# Patient Record
Sex: MALE | Race: WHITE | NOT HISPANIC OR LATINO | URBAN - METROPOLITAN AREA
[De-identification: names, ages, dates, MRNs, and addresses within clinical notes are randomized per-mention and may not be internally consistent; named-entity substitution may affect disease eponyms.]

---

## 2014-08-13 RX ORDER — METOPROLOL TARTRATE 50 MG
0.5 TABLET ORAL
Qty: 0 | Refills: 0 | COMMUNITY
Start: 2014-08-13

## 2014-08-13 RX ORDER — METOPROLOL TARTRATE 50 MG
1 TABLET ORAL
Qty: 0 | Refills: 0 | COMMUNITY
Start: 2014-08-13

## 2015-11-24 RX ORDER — ALBUTEROL 90 UG/1
2 AEROSOL, METERED ORAL
Qty: 0 | Refills: 0 | COMMUNITY
Start: 2015-11-24

## 2016-12-24 RX ORDER — SENNA PLUS 8.6 MG/1
1 TABLET ORAL
Qty: 60 | Refills: 0 | COMMUNITY
Start: 2016-12-24 | End: 2017-01-23

## 2017-07-18 ENCOUNTER — INPATIENT (INPATIENT)
Facility: HOSPITAL | Age: 72
LOS: 2 days | Discharge: NURSING, REHAB OR LONG-TERM CARE | DRG: 73 | End: 2017-07-21
Attending: INTERNAL MEDICINE | Admitting: INTERNAL MEDICINE
Payer: MEDICARE

## 2017-07-18 VITALS
RESPIRATION RATE: 14 BRPM | SYSTOLIC BLOOD PRESSURE: 140 MMHG | OXYGEN SATURATION: 98 % | DIASTOLIC BLOOD PRESSURE: 74 MMHG | HEART RATE: 68 BPM | TEMPERATURE: 98 F

## 2017-07-18 DIAGNOSIS — N18.6 END STAGE RENAL DISEASE: ICD-10-CM

## 2017-07-18 DIAGNOSIS — G62.9 POLYNEUROPATHY, UNSPECIFIED: ICD-10-CM
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
## 2017-07-18 DIAGNOSIS — I25.10 ATHEROSCLEROTIC HEART DISEASE OF NATIVE CORONARY ARTERY WITHOUT ANGINA PECTORIS: ICD-10-CM

## 2017-07-18 DIAGNOSIS — J44.9 CHRONIC OBSTRUCTIVE PULMONARY DISEASE, UNSPECIFIED: ICD-10-CM

## 2017-07-18 DIAGNOSIS — Z98.2 PRESENCE OF CEREBROSPINAL FLUID DRAINAGE DEVICE: Chronic | ICD-10-CM

## 2017-07-18 DIAGNOSIS — G91.9 HYDROCEPHALUS, UNSPECIFIED: ICD-10-CM

## 2017-07-18 LAB
ALBUMIN SERPL ELPH-MCNC: 3.7 G/DL — SIGNIFICANT CHANGE UP (ref 3.3–5)
ALP SERPL-CCNC: 96 U/L — SIGNIFICANT CHANGE UP (ref 40–120)
ALT FLD-CCNC: 11 U/L — LOW (ref 12–78)
ANION GAP SERPL CALC-SCNC: 8 MMOL/L — SIGNIFICANT CHANGE UP (ref 5–17)
APTT BLD: 30.6 SEC — SIGNIFICANT CHANGE UP (ref 27.5–37.4)
AST SERPL-CCNC: 13 U/L — LOW (ref 15–37)
BASOPHILS # BLD AUTO: 0.1 K/UL — SIGNIFICANT CHANGE UP (ref 0–0.2)
BASOPHILS NFR BLD AUTO: 1 % — SIGNIFICANT CHANGE UP (ref 0–2)
BILIRUB SERPL-MCNC: 0.6 MG/DL — SIGNIFICANT CHANGE UP (ref 0.2–1.2)
BUN SERPL-MCNC: 56 MG/DL — HIGH (ref 7–23)
CALCIUM SERPL-MCNC: 9.4 MG/DL — SIGNIFICANT CHANGE UP (ref 8.5–10.1)
CHLORIDE SERPL-SCNC: 98 MMOL/L — SIGNIFICANT CHANGE UP (ref 96–108)
CO2 SERPL-SCNC: 30 MMOL/L — SIGNIFICANT CHANGE UP (ref 22–31)
CREAT SERPL-MCNC: 11 MG/DL — HIGH (ref 0.5–1.3)
EOSINOPHIL # BLD AUTO: 0.3 K/UL — SIGNIFICANT CHANGE UP (ref 0–0.5)
EOSINOPHIL NFR BLD AUTO: 4.3 % — SIGNIFICANT CHANGE UP (ref 0–6)
GLUCOSE SERPL-MCNC: 84 MG/DL — SIGNIFICANT CHANGE UP (ref 70–99)
HCT VFR BLD CALC: 37.2 % — LOW (ref 39–50)
HGB BLD-MCNC: 12.3 G/DL — LOW (ref 13–17)
INR BLD: 1 RATIO — SIGNIFICANT CHANGE UP (ref 0.88–1.16)
LYMPHOCYTES # BLD AUTO: 1.1 K/UL — SIGNIFICANT CHANGE UP (ref 1–3.3)
LYMPHOCYTES # BLD AUTO: 19.2 % — SIGNIFICANT CHANGE UP (ref 13–44)
MCHC RBC-ENTMCNC: 31.4 PG — SIGNIFICANT CHANGE UP (ref 27–34)
MCHC RBC-ENTMCNC: 33.1 GM/DL — SIGNIFICANT CHANGE UP (ref 32–36)
MCV RBC AUTO: 94.9 FL — SIGNIFICANT CHANGE UP (ref 80–100)
MONOCYTES # BLD AUTO: 0.5 K/UL — SIGNIFICANT CHANGE UP (ref 0–0.9)
MONOCYTES NFR BLD AUTO: 8 % — SIGNIFICANT CHANGE UP (ref 1–9)
NEUTROPHILS # BLD AUTO: 4 K/UL — SIGNIFICANT CHANGE UP (ref 1.8–7.4)
NEUTROPHILS NFR BLD AUTO: 67.5 % — SIGNIFICANT CHANGE UP (ref 43–77)
PLATELET # BLD AUTO: 141 K/UL — LOW (ref 150–400)
POTASSIUM SERPL-MCNC: 6 MMOL/L — HIGH (ref 3.5–5.3)
POTASSIUM SERPL-SCNC: 6 MMOL/L — HIGH (ref 3.5–5.3)
PROT SERPL-MCNC: 7.9 G/DL — SIGNIFICANT CHANGE UP (ref 6–8.3)
PROTHROM AB SERPL-ACNC: 10.9 SEC — SIGNIFICANT CHANGE UP (ref 9.8–12.7)
RBC # BLD: 3.92 M/UL — LOW (ref 4.2–5.8)
RBC # FLD: 14.6 % — HIGH (ref 10.3–14.5)
SODIUM SERPL-SCNC: 136 MMOL/L — SIGNIFICANT CHANGE UP (ref 135–145)
WBC # BLD: 6 K/UL — SIGNIFICANT CHANGE UP (ref 3.8–10.5)
WBC # FLD AUTO: 6 K/UL — SIGNIFICANT CHANGE UP (ref 3.8–10.5)

## 2017-07-18 PROCEDURE — 70450 CT HEAD/BRAIN W/O DYE: CPT | Mod: 26

## 2017-07-18 PROCEDURE — 72125 CT NECK SPINE W/O DYE: CPT | Mod: 26

## 2017-07-18 PROCEDURE — 71010: CPT | Mod: 26

## 2017-07-18 PROCEDURE — 73562 X-RAY EXAM OF KNEE 3: CPT | Mod: 26,50

## 2017-07-18 PROCEDURE — 99285 EMERGENCY DEPT VISIT HI MDM: CPT

## 2017-07-18 RX ORDER — METOPROLOL TARTRATE 50 MG
50 TABLET ORAL
Qty: 0 | Refills: 0 | Status: DISCONTINUED | OUTPATIENT
Start: 2017-07-18 | End: 2017-07-21

## 2017-07-18 RX ORDER — HEPARIN SODIUM 5000 [USP'U]/ML
5000 INJECTION INTRAVENOUS; SUBCUTANEOUS EVERY 8 HOURS
Qty: 0 | Refills: 0 | Status: DISCONTINUED | OUTPATIENT
Start: 2017-07-18 | End: 2017-07-21

## 2017-07-18 RX ORDER — NICOTINE POLACRILEX 2 MG
1 GUM BUCCAL DAILY
Qty: 0 | Refills: 0 | Status: DISCONTINUED | OUTPATIENT
Start: 2017-07-18 | End: 2017-07-21

## 2017-07-18 RX ORDER — ASPIRIN/CALCIUM CARB/MAGNESIUM 324 MG
81 TABLET ORAL DAILY
Qty: 0 | Refills: 0 | Status: DISCONTINUED | OUTPATIENT
Start: 2017-07-18 | End: 2017-07-21

## 2017-07-18 RX ORDER — ACETAMINOPHEN 500 MG
500 TABLET ORAL
Qty: 0 | Refills: 0 | Status: DISCONTINUED | OUTPATIENT
Start: 2017-07-18 | End: 2017-07-21

## 2017-07-18 RX ORDER — FERRIC CITRATE 210 MG/1
1 TABLET, COATED ORAL
Qty: 0 | Refills: 0 | COMMUNITY

## 2017-07-18 RX ORDER — SERTRALINE 25 MG/1
1 TABLET, FILM COATED ORAL
Qty: 0 | Refills: 0 | COMMUNITY
Start: 2017-07-18

## 2017-07-18 RX ORDER — GABAPENTIN 400 MG/1
100 CAPSULE ORAL THREE TIMES A DAY
Qty: 0 | Refills: 0 | Status: DISCONTINUED | OUTPATIENT
Start: 2017-07-18 | End: 2017-07-21

## 2017-07-18 RX ORDER — SEVELAMER CARBONATE 2400 MG/1
800 POWDER, FOR SUSPENSION ORAL
Qty: 0 | Refills: 0 | Status: DISCONTINUED | OUTPATIENT
Start: 2017-07-18 | End: 2017-07-18

## 2017-07-18 RX ORDER — TIOTROPIUM BROMIDE 18 UG/1
1 CAPSULE ORAL; RESPIRATORY (INHALATION) DAILY
Qty: 0 | Refills: 0 | Status: DISCONTINUED | OUTPATIENT
Start: 2017-07-18 | End: 2017-07-21

## 2017-07-18 RX ORDER — SEVELAMER CARBONATE 2400 MG/1
800 POWDER, FOR SUSPENSION ORAL
Qty: 0 | Refills: 0 | Status: DISCONTINUED | OUTPATIENT
Start: 2017-07-18 | End: 2017-07-21

## 2017-07-18 RX ORDER — SERTRALINE 25 MG/1
100 TABLET, FILM COATED ORAL DAILY
Qty: 0 | Refills: 0 | Status: DISCONTINUED | OUTPATIENT
Start: 2017-07-18 | End: 2017-07-21

## 2017-07-18 RX ORDER — TRAZODONE HCL 50 MG
50 TABLET ORAL AT BEDTIME
Qty: 0 | Refills: 0 | Status: DISCONTINUED | OUTPATIENT
Start: 2017-07-18 | End: 2017-07-21

## 2017-07-18 RX ORDER — DOCUSATE SODIUM 100 MG
100 CAPSULE ORAL THREE TIMES A DAY
Qty: 0 | Refills: 0 | Status: DISCONTINUED | OUTPATIENT
Start: 2017-07-18 | End: 2017-07-21

## 2017-07-18 RX ORDER — SIMVASTATIN 20 MG/1
20 TABLET, FILM COATED ORAL AT BEDTIME
Qty: 0 | Refills: 0 | Status: DISCONTINUED | OUTPATIENT
Start: 2017-07-18 | End: 2017-07-21

## 2017-07-18 RX ORDER — FERRIC CITRATE 210 MG/1
0 TABLET, COATED ORAL
Qty: 0 | Refills: 0 | COMMUNITY

## 2017-07-18 RX ORDER — PANTOPRAZOLE SODIUM 20 MG/1
40 TABLET, DELAYED RELEASE ORAL
Qty: 0 | Refills: 0 | Status: DISCONTINUED | OUTPATIENT
Start: 2017-07-18 | End: 2017-07-21

## 2017-07-18 RX ADMIN — Medication 100 MILLIGRAM(S): at 22:38

## 2017-07-18 RX ADMIN — SIMVASTATIN 20 MILLIGRAM(S): 20 TABLET, FILM COATED ORAL at 22:38

## 2017-07-18 RX ADMIN — GABAPENTIN 100 MILLIGRAM(S): 400 CAPSULE ORAL at 22:38

## 2017-07-18 RX ADMIN — Medication 50 MILLIGRAM(S): at 22:38

## 2017-07-18 RX ADMIN — HEPARIN SODIUM 5000 UNIT(S): 5000 INJECTION INTRAVENOUS; SUBCUTANEOUS at 22:38

## 2017-07-18 NOTE — H&P ADULT - ASSESSMENT
70yo male on hd for esrd with hx of nph and ventricular shunt presents with frequent falls and unsteady gait

## 2017-07-18 NOTE — ED PROVIDER NOTE - PROGRESS NOTE DETAILS
mark (renal) seen eval pt in er, requests admit pt for dialysis to [facility]. [facility] (med) aware, will admit

## 2017-07-18 NOTE — ED ADULT NURSE NOTE - PSH
ESRD on dialysis  peritoneal dialysis catheter in place, left arm AV graft in place  S/P angioplasty with stent    S/P coronary artery bypass graft x 3    S/P ventricular shunt placement

## 2017-07-18 NOTE — ED PROVIDER NOTE - CARE PLAN
Principal Discharge DX:	Fall, initial encounter  Secondary Diagnosis:	Abrasion  Secondary Diagnosis:	Knee injury

## 2017-07-18 NOTE — ED PROVIDER NOTE - OBJECTIVE STATEMENT
pt bib ems for b/l knee abrasions and pain s/p mult falls today.   [facility]  [facility] - [facility] pt bib ems for b/l knee abrasions and pain s/p mult falls today. per pt and family, pt has chronic unsteady gait, falls frequently. c/o generalized weakness. denies head inj, neck or back pain, focal weakness or numbness, cp, sob, abd pain.  [facility]  [facility] - [facility]

## 2017-07-18 NOTE — CONSULT NOTE ADULT - ASSESSMENT
71 white male with a history of DM. HTN, CAD, CHF, ESRD on HD now admitted with history of fall.   Unsteady gait and falls with a history of  shunt in 2015. Neurology evaluation.   Hyperkalemia, will dialyze today for 3.5 hours with 3 kg fluid removal and on 2 K bath.  will follow closely.

## 2017-07-18 NOTE — ED ADULT NURSE NOTE - PMH
Anemia    CAD (coronary artery disease)    COPD (chronic obstructive pulmonary disease)    DM (diabetes mellitus)    ESRD on peritoneal dialysis    Hydrocephalus

## 2017-07-18 NOTE — H&P ADULT - HISTORY OF PRESENT ILLNESS
pt presents to hospital with complaints of frequent falling and unsteady gait, that has been getting worse, states his legs are weak and give out. he has been in and out of the hospital multiple times for this. today was getting ready to go to hd and fell after legs gave out, he has been using his walker more frequently lately, no cp or sob. gets hd on tues/thurs and sat.

## 2017-07-18 NOTE — H&P ADULT - PROBLEM SELECTOR PLAN 5
continue inhalers  radha ordered   ct findings reviewed with pt, states he had a PET scan recently which was ordered by his pulm, and he is due for fu ct of chest as outpt with his pulm

## 2017-07-18 NOTE — ED ADULT NURSE NOTE - OBJECTIVE STATEMENT
pt presents to the ED post fall while on his way to dialysis. Pt states he felt weak and felt like his legs gave up and fell forward on his knees. denies hitting his head or chest. Pt states he is on aspirin daily. denies LOC. No SOB, no chest pain. pt appears in no distress. noted abrasions on bilateral knees and arms. will continue to monitor.

## 2017-07-19 ENCOUNTER — TRANSCRIPTION ENCOUNTER (OUTPATIENT)
Age: 72
End: 2017-07-19

## 2017-07-19 DIAGNOSIS — D64.9 ANEMIA, UNSPECIFIED: ICD-10-CM

## 2017-07-19 DIAGNOSIS — K59.00 CONSTIPATION, UNSPECIFIED: ICD-10-CM

## 2017-07-19 DIAGNOSIS — M41.9 SCOLIOSIS, UNSPECIFIED: ICD-10-CM

## 2017-07-19 PROCEDURE — 72040 X-RAY EXAM NECK SPINE 2-3 VW: CPT | Mod: 26

## 2017-07-19 PROCEDURE — 93010 ELECTROCARDIOGRAM REPORT: CPT

## 2017-07-19 RX ORDER — SENNA PLUS 8.6 MG/1
2 TABLET ORAL AT BEDTIME
Qty: 0 | Refills: 0 | Status: DISCONTINUED | OUTPATIENT
Start: 2017-07-19 | End: 2017-07-21

## 2017-07-19 RX ADMIN — SEVELAMER CARBONATE 800 MILLIGRAM(S): 2400 POWDER, FOR SUSPENSION ORAL at 13:06

## 2017-07-19 RX ADMIN — SIMVASTATIN 20 MILLIGRAM(S): 20 TABLET, FILM COATED ORAL at 21:52

## 2017-07-19 RX ADMIN — HEPARIN SODIUM 5000 UNIT(S): 5000 INJECTION INTRAVENOUS; SUBCUTANEOUS at 05:21

## 2017-07-19 RX ADMIN — SERTRALINE 100 MILLIGRAM(S): 25 TABLET, FILM COATED ORAL at 13:04

## 2017-07-19 RX ADMIN — Medication 100 MILLIGRAM(S): at 21:51

## 2017-07-19 RX ADMIN — PANTOPRAZOLE SODIUM 40 MILLIGRAM(S): 20 TABLET, DELAYED RELEASE ORAL at 05:21

## 2017-07-19 RX ADMIN — HEPARIN SODIUM 5000 UNIT(S): 5000 INJECTION INTRAVENOUS; SUBCUTANEOUS at 13:03

## 2017-07-19 RX ADMIN — SEVELAMER CARBONATE 800 MILLIGRAM(S): 2400 POWDER, FOR SUSPENSION ORAL at 17:31

## 2017-07-19 RX ADMIN — SEVELAMER CARBONATE 800 MILLIGRAM(S): 2400 POWDER, FOR SUSPENSION ORAL at 07:52

## 2017-07-19 RX ADMIN — Medication 50 MILLIGRAM(S): at 21:52

## 2017-07-19 RX ADMIN — Medication 100 MILLIGRAM(S): at 05:21

## 2017-07-19 RX ADMIN — GABAPENTIN 100 MILLIGRAM(S): 400 CAPSULE ORAL at 13:04

## 2017-07-19 RX ADMIN — GABAPENTIN 100 MILLIGRAM(S): 400 CAPSULE ORAL at 21:52

## 2017-07-19 RX ADMIN — Medication 50 MILLIGRAM(S): at 17:31

## 2017-07-19 RX ADMIN — Medication 81 MILLIGRAM(S): at 13:05

## 2017-07-19 RX ADMIN — Medication 1 TABLET(S): at 13:03

## 2017-07-19 RX ADMIN — TIOTROPIUM BROMIDE 1 CAPSULE(S): 18 CAPSULE ORAL; RESPIRATORY (INHALATION) at 07:54

## 2017-07-19 RX ADMIN — HEPARIN SODIUM 5000 UNIT(S): 5000 INJECTION INTRAVENOUS; SUBCUTANEOUS at 21:52

## 2017-07-19 RX ADMIN — SENNA PLUS 2 TABLET(S): 8.6 TABLET ORAL at 21:52

## 2017-07-19 RX ADMIN — Medication 1 PATCH: at 13:04

## 2017-07-19 RX ADMIN — Medication 50 MILLIGRAM(S): at 05:21

## 2017-07-19 RX ADMIN — Medication 100 MILLIGRAM(S): at 13:04

## 2017-07-19 RX ADMIN — GABAPENTIN 100 MILLIGRAM(S): 400 CAPSULE ORAL at 05:21

## 2017-07-19 NOTE — PHYSICAL THERAPY INITIAL EVALUATION ADULT - IMPAIRED TRANSFERS: SIT/STAND, REHAB EVAL
impaired postural control/impaired coordination/impaired balance/decreased strength/impaired motor control/Pt reports sensation intact

## 2017-07-19 NOTE — PHYSICAL THERAPY INITIAL EVALUATION ADULT - GAIT DEVIATIONS NOTED, PT EVAL
increased alicia, vc to slow down. Pt also lifts rolling walker off ground when turning, cues for safety/decreased stride length/decreased velocity of limb motion/decreased step length/decreased weight-shifting ability

## 2017-07-19 NOTE — PHYSICAL THERAPY INITIAL EVALUATION ADULT - IMPAIRMENTS FOUND, PT EVAL
integumentary integrity/sensory integrity/fine motor/Coordination, stairs/muscle strength/gait, locomotion, and balance

## 2017-07-19 NOTE — PROGRESS NOTE ADULT - SUBJECTIVE AND OBJECTIVE BOX
Patient is a 71y old  Male who presents with a chief complaint of fall (18 Jul 2017 21:21)      INTERVAL HPI/OVERNIGHT EVENTS:pt stable, feels ok, no pain, pt and neuro eval noted. ortho called to evaulated ct findings.    MEDICATIONS  (STANDING):  aspirin enteric coated 81 milliGRAM(s) Oral daily  gabapentin 100 milliGRAM(s) Oral three times a day  traZODone 50 milliGRAM(s) Oral at bedtime  sertraline 100 milliGRAM(s) Oral daily  simvastatin 20 milliGRAM(s) Oral at bedtime  metoprolol 50 milliGRAM(s) Oral two times a day  tiotropium 18 MICROgram(s) Capsule 1 Capsule(s) Inhalation daily  docusate sodium 100 milliGRAM(s) Oral three times a day  pantoprazole    Tablet 40 milliGRAM(s) Oral before breakfast  nicotine - 21 mG/24Hr(s) Patch 1 patch Transdermal daily  multivitamin 1 Tablet(s) Oral daily  heparin  Injectable 5000 Unit(s) SubCutaneous every 8 hours  sevelamer carbonate 800 milliGRAM(s) Oral three times a day with meals    MEDICATIONS  (PRN):  acetaminophen   Tablet 500 milliGRAM(s) Oral four times a day PRN For Temp greater than 38 C (100.4 F)  acetaminophen   Tablet. 500 milliGRAM(s) Oral four times a day PRN Mild Pain (1 - 3)      Allergies    No Known Allergies    Intolerances        REVIEW OF SYSTEMS:  CONSTITUTIONAL: No fever, weight loss, or fatigue  EYES: No eye pain, visual disturbances  ENMT:  No difficulty hearing, tinnitus, vertigo; No sinus or throat pain  NECK: No pain or stiffness  RESPIRATORY: No cough, wheezing, chills or hemoptysis; No shortness of breath  CARDIOVASCULAR: No chest pain, palpitations, dizziness  GASTROINTESTINAL: No abdominal or epigastric pain. No nausea, vomiting, or hematemesis; No diarrhea or constipation. No melena or hematochezia.  GENITOURINARY: No dysuria, frequency, hematuria, or incontinence  NEUROLOGICAL: No headaches, memory loss, loss of strength, numbness, or tremors  SKIN: No itching, burning  LYMPH NODES: No enlarged glands  MUSCULOSKELETAL: le weakness, no focal deficits,no neck pain  PSYCHIATRIC: No depression, mood swings  HEME/LYMPH: No easy bruising, or bleeding gums  ALLERGY AND IMMUNOLOGIC: No hives    Vital Signs Last 24 Hrs  T(C): 36.6 (19 Jul 2017 13:49), Max: 36.9 (18 Jul 2017 15:47)  T(F): 97.9 (19 Jul 2017 13:49), Max: 98.5 (18 Jul 2017 20:55)  HR: 62 (19 Jul 2017 13:49) (62 - 68)  BP: 106/61 (19 Jul 2017 13:49) (106/61 - 149/73)  BP(mean): --  RR: 18 (19 Jul 2017 13:49) (14 - 19)  SpO2: 93% (19 Jul 2017 04:18) (93% - 100%)    PHYSICAL EXAM:  GENERAL: NAD, well-groomed, well-developed  HEAD:  Atraumatic, Normocephalic,  EYES: EOMI, PERRLA, conjunctiva and sclera clear  ENMT: No tonsillar erythema, exudates, or enlargement   NECK: Supple, No JVD, no deficits noted  NERVOUS SYSTEM:  Alert & Oriented X3, Good concentration  CHEST/LUNG: Clear to auscultation bilaterally; No rales, rhonchi, wheezing  HEART: Regular rate and rhythm  ABDOMEN: Soft, Nontender, Nondistended; Bowel sounds present  EXTREMITIES:  2+ Peripheral Pulses, no edema   LYMPH: No lymphadenopathy noted  SKIN: No rashes     LABS:                        12.3   6.0   )-----------( 141      ( 18 Jul 2017 15:15 )             37.2     18 Jul 2017 15:15    136    |  98     |  56     ----------------------------<  84     6.0     |  30     |  11.00    Ca    9.4        18 Jul 2017 15:15    TPro  7.9    /  Alb  3.7    /  TBili  0.6    /  DBili  x      /  AST  13     /  ALT  11     /  AlkPhos  96     18 Jul 2017 15:15    PT/INR - ( 18 Jul 2017 15:15 )   PT: 10.9 sec;   INR: 1.00 ratio         PTT - ( 18 Jul 2017 15:15 )  PTT:30.6 sec    CAPILLARY BLOOD GLUCOSE                RADIOLOGY & ADDITIONAL TESTS:  Patient is a 71y old  Male who presents with a chief complaint of fall (18 Jul 2017 21:21)      INTERVAL HPI/OVERNIGHT EVENTS:    MEDICATIONS  (STANDING):  aspirin enteric coated 81 milliGRAM(s) Oral daily  gabapentin 100 milliGRAM(s) Oral three times a day  traZODone 50 milliGRAM(s) Oral at bedtime  sertraline 100 milliGRAM(s) Oral daily  simvastatin 20 milliGRAM(s) Oral at bedtime  metoprolol 50 milliGRAM(s) Oral two times a day  tiotropium 18 MICROgram(s) Capsule 1 Capsule(s) Inhalation daily  docusate sodium 100 milliGRAM(s) Oral three times a day  pantoprazole    Tablet 40 milliGRAM(s) Oral before breakfast  nicotine - 21 mG/24Hr(s) Patch 1 patch Transdermal daily  multivitamin 1 Tablet(s) Oral daily  heparin  Injectable 5000 Unit(s) SubCutaneous every 8 hours  sevelamer carbonate 800 milliGRAM(s) Oral three times a day with meals    MEDICATIONS  (PRN):  acetaminophen   Tablet 500 milliGRAM(s) Oral four times a day PRN For Temp greater than 38 C (100.4 F)  acetaminophen   Tablet. 500 milliGRAM(s) Oral four times a day PRN Mild Pain (1 - 3)      Allergies    No Known Allergies    Intolerances        REVIEW OF SYSTEMS:  CONSTITUTIONAL: No fever, weight loss, or fatigue  EYES: No eye pain, visual disturbances  ENMT:  No difficulty hearing, tinnitus, vertigo; No sinus or throat pain  NECK: No pain or stiffness  RESPIRATORY: No cough, wheezing, chills or hemoptysis; No shortness of breath  CARDIOVASCULAR: No chest pain, palpitations, dizziness  GASTROINTESTINAL: No abdominal or epigastric pain. No nausea, vomiting, or hematemesis; No diarrhea or constipation. No melena or hematochezia.  GENITOURINARY: No dysuria, frequency, hematuria, or incontinence  NEUROLOGICAL: No headaches, memory loss, loss of strength, numbness, or tremors  SKIN: No itching, burning  LYMPH NODES: No enlarged glands  MUSCULOSKELETAL: No joint pain or swelling; No muscle, back, or extremity pain  PSYCHIATRIC: No depression, mood swings  HEME/LYMPH: No easy bruising, or bleeding gums  ALLERGY AND IMMUNOLOGIC: No hives    Vital Signs Last 24 Hrs  T(C): 36.6 (19 Jul 2017 13:49), Max: 36.9 (18 Jul 2017 15:47)  T(F): 97.9 (19 Jul 2017 13:49), Max: 98.5 (18 Jul 2017 20:55)  HR: 62 (19 Jul 2017 13:49) (62 - 68)  BP: 106/61 (19 Jul 2017 13:49) (106/61 - 149/73)  BP(mean): --  RR: 18 (19 Jul 2017 13:49) (14 - 19)  SpO2: 93% (19 Jul 2017 04:18) (93% - 100%)    PHYSICAL EXAM:  GENERAL: NAD, well-groomed, well-developed  HEAD:  Atraumatic, Normocephalic  EYES: EOMI, PERRLA, conjunctiva and sclera clear  ENMT: No tonsillar erythema, exudates, or enlargement   NECK: Supple, No JVD  NERVOUS SYSTEM:  Alert & Oriented X3, Good concentration  CHEST/LUNG: Clear to auscultation bilaterally; No rales, rhonchi, wheezing  HEART: Regular rate and rhythm  ABDOMEN: Soft, Nontender, Nondistended; Bowel sounds present  EXTREMITIES:  2+ Peripheral Pulses   LYMPH: No lymphadenopathy noted  SKIN: No rashes     LABS:                        12.3   6.0   )-----------( 141      ( 18 Jul 2017 15:15 )             37.2     18 Jul 2017 15:15    136    |  98     |  56     ----------------------------<  84     6.0     |  30     |  11.00    Ca    9.4        18 Jul 2017 15:15    TPro  7.9    /  Alb  3.7    /  TBili  0.6    /  DBili  x      /  AST  13     /  ALT  11     /  AlkPhos  96     18 Jul 2017 15:15    PT/INR - ( 18 Jul 2017 15:15 )   PT: 10.9 sec;   INR: 1.00 ratio         PTT - ( 18 Jul 2017 15:15 )  PTT:30.6 sec    CAPILLARY BLOOD GLUCOSE                RADIOLOGY & ADDITIONAL TESTS:  imaging reviewed with ortho hs      Consultant(s) Notes Reviewed:  x] YES  [ ] NO    Care Discussed with Consultants/Other Providers [x ] YES  [ ] NO    Advanced Care Planning Discussed with Patien/Family [ ] YES  [x ] NO

## 2017-07-19 NOTE — PHYSICAL THERAPY INITIAL EVALUATION ADULT - PLANNED THERAPY INTERVENTIONS, PT EVAL
bed mobility training/transfer training/Assess stairs in 1-2 sessions/strengthening/gait training/balance training

## 2017-07-19 NOTE — PHYSICAL THERAPY INITIAL EVALUATION ADULT - SKIN COLOR/CHARACTERISTICS
bruised (ecchymotic)/Old scar to right side of forehead after shunt. IV to left UE. Eccymosis or discoloration to right side of forehead. Tan skin. AV shunt to RUE

## 2017-07-19 NOTE — PHYSICAL THERAPY INITIAL EVALUATION ADULT - PERTINENT HX OF CURRENT PROBLEM, REHAB EVAL
As per H&P: "pt presents to hospital with complaints of frequent falling and unsteady gait, that has been getting worse, states his legs are weak and give out. he has been in and out of the hospital multiple times for this. today was getting ready to go to hd and fell after legs gave out, he has been using his walker more frequently lately, no cp or sob. gets hd on tues/thurs and sat.

## 2017-07-19 NOTE — PHYSICAL THERAPY INITIAL EVALUATION ADULT - PRECAUTIONS/LIMITATIONS, REHAB EVAL
oxygen therapy device and L/min/fall precautions/**Pt admits to falling at least 1x/wk; ventricular shunt for hydrocephalus ~2 1/2  year ago as per pt

## 2017-07-19 NOTE — DISCHARGE NOTE ADULT - CARE PROVIDER_API CALL
Redd Gray (GILMAR), Neurology  [address] [place] [address]  Phone: [phone]  Fax: [phone]    Luis Conde), Medicine  [address] [facility] [address] [place] [address]  Phone: [phone]  Fax: [phone]    Dayton Bain), Medicine  [address] [place] [address] [address] [place] [address]  Phone: [phone]  Fax: [phone]

## 2017-07-19 NOTE — DISCHARGE NOTE ADULT - HOSPITAL COURSE
admitted for frequent falls for legs always giving out, pt with hx of nph with  shunt, seen by pt, can go home with home pt. pt also seen by his neuro team, no new neuro findings. pt also seen by ortho for c-spine findings of very mild cervical sublaxation - no intervention at this time - outpt fu. he will fu with his pulm in community for ct/pet of chest. going home in stable condition after hd today. admitted for frequent falls for legs always giving out, pt with hx of nph with  shunt, seen by pt, can go home with home pt. pt also seen by his neuro team, no new neuro findings. pt also seen by ortho for c-spine findings of very mild cervical sublaxation - no intervention at this time - outpt fu. he will fu with his pulm in community for ct/pet of chest. going to [facility] today

## 2017-07-19 NOTE — DISCHARGE NOTE ADULT - NS AS ACTIVITY OBS
Bathing allowed/Showering allowed/Walking-Indoors allowed/Walking-Outdoors allowed/Stairs allowed/No Heavy lifting/straining

## 2017-07-19 NOTE — PHYSICAL THERAPY INITIAL EVALUATION ADULT - GENERAL OBSERVATIONS, REHAB EVAL
Pt rec'd sitting up at edge bed, NAD noted. Pt noted to have bilateral gauze dressings to bilateral knees. Pt agreeable /c PT eval.

## 2017-07-19 NOTE — PHYSICAL THERAPY INITIAL EVALUATION ADULT - ADDITIONAL COMMENTS
Pt lives with his brother in a private home. Pt lives downstairs alone and brother is upstairs. Pt has 6 ROBERT and another 6 inside + 6 with handrails. Pt was independent with functional mobility, ADLs and ambulation. Pt uses his rolling walker outdoors during dialysis days. Pt also has a single axis cane. Pt does not use assistive device indoors but admits to "furniture walks". Pt has had history of frequent falls in past prior to shunt placement and admits to falling at least 1x/wk currently. Pt owns shower chair, single axis cane, rolling walker, nebulizer and grab bars. Pt brother works and is in during noon time. Pt brother takes him to dialysis & other apportionments. Pt has inhalers. Pt reports being "paralysed" at birth.

## 2017-07-19 NOTE — DISCHARGE NOTE ADULT - PATIENT PORTAL LINK FT
“You can access the [facility] Patient Portal, offered by [facility], by registering with the following website: [email or web address]”

## 2017-07-19 NOTE — CONSULT NOTE ADULT - PROBLEM SELECTOR RECOMMENDATION 9
on colace 100 mg TID  add senna 2 tabs qhs  monitor for BM  likely due to slow colonic transit  outpatient follow up for colonoscopy

## 2017-07-19 NOTE — PHYSICAL THERAPY INITIAL EVALUATION ADULT - ANKLE STRATEGY ASSESSMENT, REHAB EVAL
Pt reports that falls happen and are unpredictable. Pt denies feeling any dizziness/lightheadiness when they occur. Pt states he legs just "give out". admits to falling more on right side and has hit head lightly on refrigerator. Pt says he gradually falls and is not abrupt when he does fall.

## 2017-07-19 NOTE — CONSULT NOTE ADULT - SUBJECTIVE AND OBJECTIVE BOX
Nephrology Consultation: MD MAREK Andujar, SABINE  71y    HPI: 71 white male with a history of HTN, CAD, CHF, ESRD on HD, DM, PVD now presented with history of fall X 4 today. As per patient he has been unsteady on his feet for the last one or two weeks. He has been using walker to ambulate. Denies any chest pain or shortness of breath. Denies any fever, chills or head aches. Denies any nausea, vomiting or diarrhea. Has a history of  shunt for the hydrocephalus.       PAST MEDICAL & SURGICAL HISTORY:  Hydrocephalus  Anemia  CAD (coronary artery disease)  DM (diabetes mellitus)  ESRD on peritoneal dialysis  COPD (chronic obstructive pulmonary disease)  S/P ventricular shunt placement  ESRD on dialysis: peritoneal dialysis catheter in place, left arm AV graft in place  S/P angioplasty with stent  S/P coronary artery bypass graft x 3      Allergies    No Known Allergies    Intolerances            FAMILY HISTORY:  No pertinent family history in first degree relatives      SOCIAL HISTORY:    REVIEW OF SYSTEMS:    Constitutional: No fever, weight loss or fatigue  Eyes: No eye pain, visual disturbances, or discharge  ENT:  No difficulty hearing, tinnitus, vertigo; No sinus or throat pain  Neck: No pain or stiffness  Breasts: No pain, masses or nipple discharge  Respiratory: No cough, wheezing, chills or hemoptysis  Cardiovascular: No chest pain, palpitations, shortness of breath, dizziness or leg swelling  Gastrointestinal: No abdominal or epigastric pain. No nausea, vomiting or hematemesis; No diarrhea or constipation. No melena or hematochezia.  Genitourinary: No dysuria, frequency, hematuria or incontinence  Rectal: No pain, hemorrhoids or incontinence  Neurological: No headaches, memory loss, loss of strength, numbness or tremors  Skin: No itching, burning, rashes or lesions   Lymph Nodes: No enlarged glands  Endocrine: No heat or cold intolerance; No hair loss  Musculoskeletal: No joint pain or swelling; No muscle, back or extremity pain  Psychiatric: No depression, anxiety, mood swings or difficulty sleeping  Heme/Lymph: No easy bruising or bleeding gums  Allergy and Immunologic: No hives or eczema        Vital Signs Last 24 Hrs  T(C): 36.9 (18 Jul 2017 15:47), Max: 36.9 (18 Jul 2017 15:47)  T(F): 98.4 (18 Jul 2017 15:47), Max: 98.4 (18 Jul 2017 15:47)  HR: 63 (18 Jul 2017 15:47) (63 - 68)  BP: 135/73 (18 Jul 2017 15:47) (135/73 - 140/74)  BP(mean): --  RR: 15 (18 Jul 2017 15:47) (14 - 15)  SpO2: 94% (18 Jul 2017 15:47) (94% - 98%)    PHYSICAL EXAM:    Constitutional: NAD, well-groomed, well-developed  HEENT: PERRLA, EOMI, Normal Hearing, MMM  Neck: No LAD, No JVD  Back: Normal spine flexure, No CVA tenderness  Respiratory: CTAB/L   Cardiovascular: S1 and S2, RRR, no M/G/R  Gastrointestinal: BS+, soft, NT/ND  Extremities: No peripheral edema, has multiple bruises on the legs.  Vascular: 2+ peripheral pulses  Neurological: A/O x 3, no focal deficits  Skin: No rashes      LABS:                        12.3   6.0   )-----------( 141      ( 18 Jul 2017 15:15 )             37.2     07-18    136  |  98  |  56<H>  ----------------------------<  84  6.0<H>   |  30  |  11.00<H>    Ca    9.4      18 Jul 2017 15:15    TPro  7.9  /  Alb  3.7  /  TBili  0.6  /  DBili  x   /  AST  13<L>  /  ALT  11<L>  /  AlkPhos  96  07-18    PT/INR - ( 18 Jul 2017 15:15 )   PT: 10.9 sec;   INR: 1.00 ratio         PTT - ( 18 Jul 2017 15:15 )  PTT:30.6 sec      MICROBIOLOGY:  RECENT CULTURES:        RADIOLOGY & ADDITIONAL STUDIES:    < from: Xray Knee 3 Views, Bilateral (07.18.17 @ 15:22) >    INTERPRETATION:  Bilateral knee pain. Injury    Bilateral knees, 5 views.    No fracture or dislocation bilaterally. Mild bilateral medial compartment   narrowing. Extensive bilateral arterial calcification. Surgical clips   project over the left popliteal region.    Impression: No fracture
Chief Complaint:  Patient is a 71y old  Male who presents with a chief complaint of fall (2017 14:19)      HPI: 70 yo male admitted s/p fall with complaint of constipation. Patient states his last BM was 2 days ago. Normally has a bm once every 2 days. Denies blood in stools, abdominal pain, weight loss.     Allergies:  No Known Allergies      Medications:  aspirin enteric coated 81 milliGRAM(s) Oral daily  acetaminophen   Tablet 500 milliGRAM(s) Oral four times a day PRN  acetaminophen   Tablet. 500 milliGRAM(s) Oral four times a day PRN  gabapentin 100 milliGRAM(s) Oral three times a day  traZODone 50 milliGRAM(s) Oral at bedtime  sertraline 100 milliGRAM(s) Oral daily  simvastatin 20 milliGRAM(s) Oral at bedtime  metoprolol 50 milliGRAM(s) Oral two times a day  tiotropium 18 MICROgram(s) Capsule 1 Capsule(s) Inhalation daily  docusate sodium 100 milliGRAM(s) Oral three times a day  pantoprazole    Tablet 40 milliGRAM(s) Oral before breakfast  nicotine - 21 mG/24Hr(s) Patch 1 patch Transdermal daily  multivitamin 1 Tablet(s) Oral daily  heparin  Injectable 5000 Unit(s) SubCutaneous every 8 hours  sevelamer carbonate 800 milliGRAM(s) Oral three times a day with meals      PMHX/PSHX:  Hydrocephalus  Anemia  CAD (coronary artery disease)  DM (diabetes mellitus)  ESRD on peritoneal dialysis  COPD (chronic obstructive pulmonary disease)  S/P ventricular shunt placement  ESRD on dialysis  S/P angioplasty with stent  S/P coronary artery bypass graft x 3      Family history:  No pertinent family history in first degree relatives      Social History:     ROS:     General:  No wt loss, fevers, chills, night sweats, fatigue,   Eyes:  Good vision, no reported pain  ENT:  No sore throat, pain, runny nose, dysphagia  CV:  No pain, palpitations, hypo/hypertension  Resp:  No dyspnea, cough, tachypnea, wheezing  GI:  No pain, No nausea, No vomiting, No diarrhea, No constipation, No weight loss, No fever, No pruritis, No rectal bleeding, No tarry stools, No dysphagia,  :  No pain, bleeding, incontinence, nocturia  Muscle:  No pain, weakness  Neuro:  No weakness, tingling, memory problems  Psych:  No fatigue, insomnia, mood problems, depression  Endocrine:  No polyuria, polydipsia, cold/heat intolerance  Heme:  No petechiae, ecchymosis, easy bruisability  Skin:  No rash, tattoos, scars, edema      PHYSICAL EXAM:   Vital Signs:  Vital Signs Last 24 Hrs  T(C): 36.6 (2017 13:49), Max: 36.9 (2017 15:47)  T(F): 97.9 (2017 13:49), Max: 98.5 (2017 20:55)  HR: 62 (2017 13:49) (62 - 66)  BP: 106/61 (2017 13:49) (106/61 - 149/73)  BP(mean): --  RR: 18 (2017 13:49) (15 - 19)  SpO2: 93% (2017 04:18) (93% - 100%)  Daily     Daily Weight in k.2 (2017 14:19)    GENERAL:  Appears stated age, well-groomed, well-nourished, no distress  HEENT:  NC/AT,  conjunctivae clear and pink, no thyromegaly, nodules, adenopathy, no JVD, sclera -anicteric  CHEST:  Full & symmetric excursion, no increased effort, breath sounds clear  HEART:  Regular rhythm, S1, S2, no murmur/rub/S3/S4, no abdominal bruit, no edema  ABDOMEN:  Soft, non-tender, non-distended, normoactive bowel sounds,  no masses ,no hepato-splenomegaly, no signs of chronic liver disease  EXTEREMITIES:  no cyanosis,clubbing or edema  SKIN:  No rash/erythema/ecchymoses/petechiae/wounds/abscess/warm/dry  NEURO:  Alert, oriented, no asterixis, no tremor, no encephalopathy    LABS:                        12.3   6.0   )-----------( 141      ( 2017 15:15 )             37.2     07-18    136  |  98  |  56<H>  ----------------------------<  84  6.0<H>   |  30  |  11.00<H>    Ca    9.4      2017 15:15    TPro  7.9  /  Alb  3.7  /  TBili  0.6  /  DBili  x   /  AST  13<L>  /  ALT  11<L>  /  AlkPhos  96  07-18    LIVER FUNCTIONS - ( 2017 15:15 )  Alb: 3.7 g/dL / Pro: 7.9 g/dL / ALK PHOS: 96 U/L / ALT: 11 U/L / AST: 13 U/L / GGT: x           PT/INR - ( 2017 15:15 )   PT: 10.9 sec;   INR: 1.00 ratio         PTT - ( 2017 15:15 )  PTT:30.6 sec        Imaging:
Patient is a 71y Male community ambulator w/o assitive device presents to ortho team while being admitted for frequent falls. Pt fell over 1 week ago. denies hs/loc. pt denies any neck pain. pt able to currently ambulate w/o pain. Denies pain/injury elsewhere. Denies numbness/tingling/paresthesias/weakness. Denies bowel/bladder incontinence. Denies fevers/chills. No other complaints at this time. pt has shunt for NPH in which follows up with nsx.    HEALTH ISSUES - PROBLEM Dx:  Cervical scoliosis: Cervical scoliosis  COPD (chronic obstructive pulmonary disease): COPD (chronic obstructive pulmonary disease)  CAD (coronary artery disease): CAD (coronary artery disease)  ESRD on peritoneal dialysis: ESRD on peritoneal dialysis  Hydrocephalus: Hydrocephalus  Fall, initial encounter: Fall, initial encounter      MEDICATIONS  (STANDING):  aspirin enteric coated 81 milliGRAM(s) Oral daily  gabapentin 100 milliGRAM(s) Oral three times a day  traZODone 50 milliGRAM(s) Oral at bedtime  sertraline 100 milliGRAM(s) Oral daily  simvastatin 20 milliGRAM(s) Oral at bedtime  metoprolol 50 milliGRAM(s) Oral two times a day  tiotropium 18 MICROgram(s) Capsule 1 Capsule(s) Inhalation daily  docusate sodium 100 milliGRAM(s) Oral three times a day  pantoprazole    Tablet 40 milliGRAM(s) Oral before breakfast  nicotine - 21 mG/24Hr(s) Patch 1 patch Transdermal daily  multivitamin 1 Tablet(s) Oral daily  heparin  Injectable 5000 Unit(s) SubCutaneous every 8 hours  sevelamer carbonate 800 milliGRAM(s) Oral three times a day with meals    Allergies    No Known Allergies    Intolerances    PAST MEDICAL & SURGICAL HISTORY:  Hydrocephalus  Anemia  CAD (coronary artery disease)  DM (diabetes mellitus)  ESRD on peritoneal dialysis  COPD (chronic obstructive pulmonary disease)  S/P ventricular shunt placement for NPH  ESRD on dialysis: peritoneal dialysis catheter in place, left arm AV graft in place  S/P angioplasty with stent  S/P coronary artery bypass graft x 3                        12.3   6.0   )-----------( 141      ( 18 Jul 2017 15:15 )             37.2       18 Jul 2017 15:15    136    |  98     |  56     ----------------------------<  84     6.0     |  30     |  11.00    Ca    9.4        18 Jul 2017 15:15    TPro  7.9    /  Alb  3.7    /  TBili  0.6    /  DBili  x      /  AST  13     /  ALT  11     /  AlkPhos  96     18 Jul 2017 15:15      PT/INR - ( 18 Jul 2017 15:15 )   PT: 10.9 sec;   INR: 1.00 ratio         PTT - ( 18 Jul 2017 15:15 )  PTT:30.6 sec      Vital Signs Last 24 Hrs  T(C): 36.6 (07-19-17 @ 13:49), Max: 36.9 (07-18-17 @ 15:47)  T(F): 97.9 (07-19-17 @ 13:49), Max: 98.5 (07-18-17 @ 20:55)  HR: 62 (07-19-17 @ 13:49) (62 - 68)  BP: 106/61 (07-19-17 @ 13:49) (106/61 - 149/73)  BP(mean): --  RR: 18 (07-19-17 @ 13:49) (14 - 19)  SpO2: 93% (07-19-17 @ 04:18) (93% - 100%)    Gen: NAD, resting comfortably    Spine PE:  Skin intact  No gross deformity  No midnline TTP C/T/L/S spine  No bony step offs  No paraspinal muscle ttp/hypertonicity   Negative clonus  Negative babinski  Negative yates  no pain with Active and passive ROM of cervical/thoracic/lumbar spine    Motor:            ElbowFlex    WristExt   ElbowExt   FingerFlex   FingerAbd     R            5/5                5/5            5/5             5/5               5/5  L            5/5                5/5            5/5             5/5               5/5                    C5                C6              C7               C8           T1   R            5/5                5/5            5/5             5/5               5/5  L             5/5                5/5            5/5             5/5               5/5          HipFlex   HipExt   KneeFlex   KneeExt   AnkleDorsi   AnklePlantar   HaluxDorsi   HaluxPlantar  R        5/5        5/5            5/5            5/5             5/5                 5/5                    5/5                5/5  L         5/5        5/5            5/5            5/5             5/5                 5/5                    5/5                5/5                L2             L3             L4               L5            S1  R         5/5                5/5            5/5             5/5               5/5  L           5/5                5/5            5/5             5/5               5/5    Sensory:            C5         C6         C7      C8       T1        (0=absent, 1=impaired, 2=normal, NT=not testable)  R         2            2           2        2          2  L          2            2           2        2          2               L2          L3         L4      L5       S1         (0=absent, 1=impaired, 2=normal, NT=not testable)  R       2            2           2        2          2  L         2            2           2        2          2    secondary survey no ttp to any other bony prominences no pain with ROM any other joint, mild abrasions over bilateral knees with nonadherent dressing and gauze, pt able to slr b/l le's w/o pain.    Imaging: CT CSpx: C3-4 2mm spondylisthesis, diffuse spondylosis  BL Knee XR: wnl, neg fx

## 2017-07-19 NOTE — PROGRESS NOTE ADULT - SUBJECTIVE AND OBJECTIVE BOX
Patient is a 71y old  Male who presents with a chief complaint of fall (19 Jul 2017 14:19)      Patient seen in follow up for ESRD on HD. No new complaints.     PAST MEDICAL HISTORY:  Hydrocephalus  Anemia  CAD (coronary artery disease)  DM (diabetes mellitus)  ESRD on peritoneal dialysis  COPD (chronic obstructive pulmonary disease)    MEDICATIONS  (STANDING):  aspirin enteric coated 81 milliGRAM(s) Oral daily  gabapentin 100 milliGRAM(s) Oral three times a day  traZODone 50 milliGRAM(s) Oral at bedtime  sertraline 100 milliGRAM(s) Oral daily  simvastatin 20 milliGRAM(s) Oral at bedtime  metoprolol 50 milliGRAM(s) Oral two times a day  tiotropium 18 MICROgram(s) Capsule 1 Capsule(s) Inhalation daily  docusate sodium 100 milliGRAM(s) Oral three times a day  pantoprazole    Tablet 40 milliGRAM(s) Oral before breakfast  nicotine - 21 mG/24Hr(s) Patch 1 patch Transdermal daily  multivitamin 1 Tablet(s) Oral daily  heparin  Injectable 5000 Unit(s) SubCutaneous every 8 hours  sevelamer carbonate 800 milliGRAM(s) Oral three times a day with meals  senna 2 Tablet(s) Oral at bedtime    MEDICATIONS  (PRN):  acetaminophen   Tablet 500 milliGRAM(s) Oral four times a day PRN For Temp greater than 38 C (100.4 F)  acetaminophen   Tablet. 500 milliGRAM(s) Oral four times a day PRN Mild Pain (1 - 3)    T(C): 36.6 (07-19-17 @ 20:22), Max: 36.9 (07-18-17 @ 15:47)  HR: 61 (07-19-17 @ 20:22) (61 - 68)  BP: 128/71 (07-19-17 @ 20:22) (106/61 - 149/73)  RR: 18 (07-19-17 @ 20:22) (14 - 19)  SpO2: 93% (07-19-17 @ 20:22) (93% - 100%)  Wt(kg): --  I&O's Detail    18 Jul 2017 07:01  -  19 Jul 2017 07:00  --------------------------------------------------------  IN:  Total IN: 0 mL    OUT:    Other: 2000 mL  Total OUT: 2000 mL    Total NET: -2000 mL          PHYSICAL EXAM:  General: NAD  Respiratory: b/l air entry  Cardiovascular: S1 S2  Gastrointestinal: soft  Extremities:  no edema                          12.3   6.0   )-----------( 141      ( 18 Jul 2017 15:15 )             37.2     07-18    136  |  98  |  56<H>  ----------------------------<  84  6.0<H>   |  30  |  11.00<H>    Ca    9.4      18 Jul 2017 15:15    TPro  7.9  /  Alb  3.7  /  TBili  0.6  /  DBili  x   /  AST  13<L>  /  ALT  11<L>  /  AlkPhos  96  07-18        LIVER FUNCTIONS - ( 18 Jul 2017 15:15 )  Alb: 3.7 g/dL / Pro: 7.9 g/dL / ALK PHOS: 96 U/L / ALT: 11 U/L / AST: 13 U/L / GGT: x               Sodium, Serum: 136 (07-18 @ 15:15)    Creatinine, Serum: 11.00 (07-18 @ 15:15)    Potassium, Serum: 6.0 (07-18 @ 15:15)    Hemoglobin: 12.3 (07-18 @ 15:15)

## 2017-07-19 NOTE — PHYSICAL THERAPY INITIAL EVALUATION ADULT - HIP STRATEGY ASSESSMENT, REHAB EVAL
**Spoke to neurologist Dr. Gray who states pt does have neuropathies and that pt does have recurrent falls.

## 2017-07-19 NOTE — DISCHARGE NOTE ADULT - MEDICATION SUMMARY - MEDICATIONS TO STOP TAKING
I will STOP taking the medications listed below when I get home from the hospital:    predniSONE 20 mg oral tablet  -- 1 tab(s) by mouth once a day for 4 more days 12/25 through 12/28    hydrocortisone 25 mg rectal suppository  -- 1 suppository(ies) rectally once a day    Augmentin 500 mg-125 mg oral tablet  -- 1 tab(s) by mouth once a day in the evening (after dialysis on HD days) for 3 more days 12/25-12/27

## 2017-07-19 NOTE — PHYSICAL THERAPY INITIAL EVALUATION ADULT - AMBULATION SKILLS, REHAB EVAL
rolling walker outdoors during dialysis days. Usually no device indoors and "furniture walks"/independent/needs device

## 2017-07-19 NOTE — CONSULT NOTE ADULT - ASSESSMENT
A/P: 71y Male with incidental finding on CT C3-4 2mm spondylisthesis and diffuse spondylosis    no concern for cervical instability, if concerned for cervical pathology can get MRI CSpx outpatient  falls likely 2/2 NPH  NSx/Neurology mgmt for NPH/shunt mgmt  Pain control  WBAT with assistive devices as needed  dvt ppx, encourage ambulation  FU XR CSpx  no acute orthopedics surgical intervention at this time  c/w med mgmt  imaging reviewed  f/u dr thayer as needed in office 1-2 weeks from DC from hospital, call office for appointment  ortho stable for DC

## 2017-07-19 NOTE — PHYSICAL THERAPY INITIAL EVALUATION ADULT - RANGE OF MOTION EXAMINATION, REHAB EVAL
bilateral lower extremity ROM was WFL (within functional limits)/slight clawing to hand, right > left. Pt admits to num,bness to bilatearl hands at times, not currently./bilateral upper extremity ROM was WFL (within functional limits)/deficits as listed below

## 2017-07-19 NOTE — DISCHARGE NOTE ADULT - PLAN OF CARE
no more fall seen by PT, recommend home PT  fu with neuro as outpt  no ortho intervention at this time stable   fu with neuro in office hd as scheduled fu with your regular pulmonary :: farhad mcintyre as scheduled for ct chest as outpt and pet scan to further evaluate lung ct findings seen in hospital

## 2017-07-19 NOTE — DISCHARGE NOTE ADULT - MEDICATION SUMMARY - MEDICATIONS TO TAKE
I will START or STAY ON the medications listed below when I get home from the hospital:    Aspirin Enteric Coated 81 mg oral delayed release tablet  -- 1 tab(s) by mouth once a day  -- Indication: For Heart    Tylenol 500 mg oral tablet  -- 2 tab(s) by mouth , As Needed  -- Indication: For pain or fever    gabapentin 100 mg oral capsule  -- 1  by mouth 3 times a day  -- Indication: For nerve pain    traZODone 50 mg oral tablet  -- 1  by mouth once (at bedtime)  -- Indication: For depression    Zoloft 100 mg oral tablet  -- 1 tab(s) by mouth once a day  -- Indication: For depression    pravastatin 80 mg oral tablet  -- 1 tab(s) by mouth once (at bedtime)  -- Indication: For Heart    metoprolol tartrate 50 mg oral tablet  -- 1 tab(s) by mouth 2 times a day  -- Indication: For Heart    fluticasone-salmeterol 100 mcg-50 mcg inhalation powder  -- 1 puff(s) inhaled 2 times a day  -- Check with your doctor before becoming pregnant.  For inhalation only.  Obtain medical advice before taking any non-prescription drugs as some may affect the action of this medication.  Rinse mouth thoroughly after use.    -- Indication: For COPD (chronic obstructive pulmonary disease)    tiotropium 18 mcg inhalation capsule  -- 1 cap(s) inhaled once a day  -- Indication: For COPD (chronic obstructive pulmonary disease)    albuterol 2.5 mg/3 mL (0.083%) inhalation solution  -- 3 milliliter(s) inhaled every 6 hours, As Needed  -- Indication: For COPD (chronic obstructive pulmonary disease)    albuterol CFC free 90 mcg/inh inhalation aerosol  -- 2 puff(s) inhaled every 4 hours, As Needed  -- Indication: For COPD (chronic obstructive pulmonary disease)    Colace 100 mg oral capsule  -- 1 cap(s) by mouth 3 times a day  -- Indication: For Constipation    Innerclean oral tablet  -- 1 tab(s) by mouth once a day (at bedtime), As Needed -Constipation  -- Indication: For Constipation    magnesium oxide 400 mg (241.3 mg elemental magnesium) oral tablet  -- 1 tab(s) by mouth once a day  -- Indication: For mvi    Fish Oil  -- 3000 milligram(s) by mouth once a day  -- Indication: For mvi    sevelamer carbonate carbonate 800 mg oral tablet  -- 4 tab(s) by mouth 3 times a day  -- Indication: For mvi    omeprazole 20 mg oral delayed release tablet  -- 2 tab(s) by mouth once a day  -- Indication: For gerd    buPROPion 100 mg oral tablet  -- 1 tab(s) by mouth 2 times a day at 8 am and 4 pm for 27 days  -- Indication: For smoking    nicotine 21 mg/24 hr transdermal film, extended release  -- 21 milligram(s) by transdermal patch once a day  -- Indication: For smoking    Lindsey-Sarita oral tablet  -- 1 tab(s) by mouth once a day  -- Indication: For mvi

## 2017-07-19 NOTE — CHART NOTE - NSCHARTNOTEFT_GEN_A_CORE
A/P: 71M with incidental finding on CT C3-4 2mm spondylisthesis and diffuse spondylosis    Discussed with attending, agrees with plan  No concern for cervical instability, if concerned for cervical pathology can get MRI CSpx outpatient  Pain control, WBAT, no brace necessary  DVT PPX, encourage ambulation  No acute orthopedic surgical intervention at this time  Can follow-up as outpatient with Dr. Cardenas in 1-2 weeks as needed, call office for appointment  Ortho stable for discharge

## 2017-07-19 NOTE — PHYSICAL THERAPY INITIAL EVALUATION ADULT - MANUAL MUSCLE TESTING RESULTS, REHAB EVAL
Right UE 4/5, left UE < 4-/5, right hip < 4-/5, right knee to ankle 4 to 4+/5, LLE 4+/5/grossly assessed due to

## 2017-07-19 NOTE — PHYSICAL THERAPY INITIAL EVALUATION ADULT - IMPAIRMENTS CONTRIBUTING TO GAIT DEVIATIONS, PT EVAL
impaired motor control/impaired balance/impaired coordination/impaired postural control/ataxic/decreased strength/Amb Bal /c RW Fair , Good-  End

## 2017-07-19 NOTE — DISCHARGE NOTE ADULT - COMMUNITY RESOURCES
Pt to be discharged to [facility] for [facility], #[phone].

## 2017-07-19 NOTE — PROGRESS NOTE ADULT - ASSESSMENT
·	ESRD on HD    To continue HD TIW as scheduled. Fluid removal as tolerated by BP.   Dietary and PO fluid restriction. Epogen on hold.

## 2017-07-19 NOTE — PHYSICAL THERAPY INITIAL EVALUATION ADULT - TRANSFER SAFETY CONCERNS NOTED: SIT/STAND, REHAB EVAL
decreased proprioception/decreased weight-shifting ability/losing balance/decreased sequencing ability

## 2017-07-19 NOTE — DISCHARGE NOTE ADULT - CARE PLAN
Principal Discharge DX:	Fall, initial encounter  Goal:	no more fall  Instructions for follow-up, activity and diet:	seen by PT, recommend home PT  fu with neuro as outpt  no ortho intervention at this time  Secondary Diagnosis:	S/P ventricular shunt placement  Instructions for follow-up, activity and diet:	stable   fu with neuro in office  Secondary Diagnosis:	ESRD on dialysis  Instructions for follow-up, activity and diet:	hd as scheduled  Secondary Diagnosis:	COPD (chronic obstructive pulmonary disease)  Instructions for follow-up, activity and diet:	fu with your regular pulmonary :: farhad  fu as scheduled for ct chest as outpt and pet scan to further evaluate lung ct findings seen in hospital

## 2017-07-19 NOTE — PHYSICAL THERAPY INITIAL EVALUATION ADULT - DISCHARGE DISPOSITION, PT EVAL
home w/ assist/HCPT /c assistance from family and than afterwards follow up as an out-patient PT/home w/ home PT

## 2017-07-19 NOTE — PHYSICAL THERAPY INITIAL EVALUATION ADULT - DID THE PATIENT HAVE SURGERY?
n/a/X-ray to bilateral knees: (-) fx; CT head: (-) bleed or fx; CT C/spine: (-) fx, (+) anterior subluxation of C3 on C4, multilevel degenerative changes

## 2017-07-20 LAB
ANION GAP SERPL CALC-SCNC: 10 MMOL/L — SIGNIFICANT CHANGE UP (ref 5–17)
BUN SERPL-MCNC: 48 MG/DL — HIGH (ref 7–23)
CALCIUM SERPL-MCNC: 9.4 MG/DL — SIGNIFICANT CHANGE UP (ref 8.5–10.1)
CHLORIDE SERPL-SCNC: 93 MMOL/L — LOW (ref 96–108)
CO2 SERPL-SCNC: 28 MMOL/L — SIGNIFICANT CHANGE UP (ref 22–31)
CREAT SERPL-MCNC: 9 MG/DL — HIGH (ref 0.5–1.3)
FERRITIN SERPL-MCNC: 157 NG/ML — SIGNIFICANT CHANGE UP (ref 30–400)
FOLATE SERPL-MCNC: 19.8 NG/ML — SIGNIFICANT CHANGE UP (ref 4.8–24.2)
GLUCOSE SERPL-MCNC: 120 MG/DL — HIGH (ref 70–99)
HCT VFR BLD CALC: 33.4 % — LOW (ref 39–50)
HGB BLD-MCNC: 11.2 G/DL — LOW (ref 13–17)
IRON SATN MFR SERPL: 33 % — SIGNIFICANT CHANGE UP (ref 16–55)
IRON SATN MFR SERPL: 79 UG/DL — SIGNIFICANT CHANGE UP (ref 45–165)
MCHC RBC-ENTMCNC: 31.7 PG — SIGNIFICANT CHANGE UP (ref 27–34)
MCHC RBC-ENTMCNC: 33.5 GM/DL — SIGNIFICANT CHANGE UP (ref 32–36)
MCV RBC AUTO: 94.4 FL — SIGNIFICANT CHANGE UP (ref 80–100)
PLATELET # BLD AUTO: 126 K/UL — LOW (ref 150–400)
POTASSIUM SERPL-MCNC: 5.3 MMOL/L — SIGNIFICANT CHANGE UP (ref 3.5–5.3)
POTASSIUM SERPL-SCNC: 5.3 MMOL/L — SIGNIFICANT CHANGE UP (ref 3.5–5.3)
RBC # BLD: 3.54 M/UL — LOW (ref 4.2–5.8)
RBC # FLD: 14.3 % — SIGNIFICANT CHANGE UP (ref 10.3–14.5)
SODIUM SERPL-SCNC: 131 MMOL/L — LOW (ref 135–145)
TIBC SERPL-MCNC: 238 UG/DL — SIGNIFICANT CHANGE UP (ref 220–430)
UIBC SERPL-MCNC: 159 UG/DL — SIGNIFICANT CHANGE UP (ref 110–370)
VIT B12 SERPL-MCNC: 583 PG/ML — SIGNIFICANT CHANGE UP (ref 243–894)
WBC # BLD: 5.5 K/UL — SIGNIFICANT CHANGE UP (ref 3.8–10.5)
WBC # FLD AUTO: 5.5 K/UL — SIGNIFICANT CHANGE UP (ref 3.8–10.5)

## 2017-07-20 RX ORDER — FERRIC CITRATE 210 MG/1
1 TABLET, COATED ORAL
Qty: 0 | Refills: 0 | COMMUNITY

## 2017-07-20 RX ORDER — NICOTINE POLACRILEX 2 MG
21 GUM BUCCAL
Qty: 21 | Refills: 0 | OUTPATIENT
Start: 2017-07-20 | End: 2017-08-10

## 2017-07-20 RX ADMIN — PANTOPRAZOLE SODIUM 40 MILLIGRAM(S): 20 TABLET, DELAYED RELEASE ORAL at 05:41

## 2017-07-20 RX ADMIN — SERTRALINE 100 MILLIGRAM(S): 25 TABLET, FILM COATED ORAL at 14:07

## 2017-07-20 RX ADMIN — Medication 1 PATCH: at 14:10

## 2017-07-20 RX ADMIN — Medication 50 MILLIGRAM(S): at 21:57

## 2017-07-20 RX ADMIN — SEVELAMER CARBONATE 800 MILLIGRAM(S): 2400 POWDER, FOR SUSPENSION ORAL at 14:07

## 2017-07-20 RX ADMIN — Medication 1 PATCH: at 14:08

## 2017-07-20 RX ADMIN — Medication 50 MILLIGRAM(S): at 05:41

## 2017-07-20 RX ADMIN — HEPARIN SODIUM 5000 UNIT(S): 5000 INJECTION INTRAVENOUS; SUBCUTANEOUS at 05:41

## 2017-07-20 RX ADMIN — HEPARIN SODIUM 5000 UNIT(S): 5000 INJECTION INTRAVENOUS; SUBCUTANEOUS at 21:56

## 2017-07-20 RX ADMIN — Medication 50 MILLIGRAM(S): at 17:18

## 2017-07-20 RX ADMIN — Medication 100 MILLIGRAM(S): at 14:07

## 2017-07-20 RX ADMIN — Medication 1 TABLET(S): at 14:08

## 2017-07-20 RX ADMIN — SIMVASTATIN 20 MILLIGRAM(S): 20 TABLET, FILM COATED ORAL at 21:56

## 2017-07-20 RX ADMIN — GABAPENTIN 100 MILLIGRAM(S): 400 CAPSULE ORAL at 05:41

## 2017-07-20 RX ADMIN — GABAPENTIN 100 MILLIGRAM(S): 400 CAPSULE ORAL at 21:56

## 2017-07-20 RX ADMIN — Medication 100 MILLIGRAM(S): at 05:41

## 2017-07-20 RX ADMIN — SEVELAMER CARBONATE 800 MILLIGRAM(S): 2400 POWDER, FOR SUSPENSION ORAL at 08:00

## 2017-07-20 RX ADMIN — TIOTROPIUM BROMIDE 1 CAPSULE(S): 18 CAPSULE ORAL; RESPIRATORY (INHALATION) at 05:41

## 2017-07-20 RX ADMIN — HEPARIN SODIUM 5000 UNIT(S): 5000 INJECTION INTRAVENOUS; SUBCUTANEOUS at 14:07

## 2017-07-20 RX ADMIN — Medication 81 MILLIGRAM(S): at 14:07

## 2017-07-20 RX ADMIN — Medication 100 MILLIGRAM(S): at 21:57

## 2017-07-20 RX ADMIN — SEVELAMER CARBONATE 800 MILLIGRAM(S): 2400 POWDER, FOR SUSPENSION ORAL at 17:18

## 2017-07-20 RX ADMIN — GABAPENTIN 100 MILLIGRAM(S): 400 CAPSULE ORAL at 14:07

## 2017-07-20 RX ADMIN — SENNA PLUS 2 TABLET(S): 8.6 TABLET ORAL at 21:56

## 2017-07-20 NOTE — PROGRESS NOTE ADULT - SUBJECTIVE AND OBJECTIVE BOX
Patient is a 71y old  Male who presents with a chief complaint of fall (19 Jul 2017 14:19)    Patient seen in follow up for ESRD, stable on HD. No new complaints.     Vital Signs Last 24 Hrs  T(C): 36.3 (07-20-17 @ 15:27), Max: 36.7 (07-20-17 @ 09:35)  T(F): 97.4 (07-20-17 @ 15:27), Max: 98 (07-20-17 @ 09:35)  HR: 61 (07-20-17 @ 17:12) (57 - 92)  BP: 122/73 (07-20-17 @ 17:12) (122/73 - 150/67)  BP(mean): --  RR: 18 (07-20-17 @ 17:12) (18 - 18)  SpO2: 95% (07-20-17 @ 17:12) (92% - 98%)    Meds:    aspirin enteric coated 81 milliGRAM(s) Oral daily  acetaminophen   Tablet 500 milliGRAM(s) Oral four times a day PRN  acetaminophen   Tablet. 500 milliGRAM(s) Oral four times a day PRN  gabapentin 100 milliGRAM(s) Oral three times a day  traZODone 50 milliGRAM(s) Oral at bedtime  sertraline 100 milliGRAM(s) Oral daily  simvastatin 20 milliGRAM(s) Oral at bedtime  metoprolol 50 milliGRAM(s) Oral two times a day  tiotropium 18 MICROgram(s) Capsule 1 Capsule(s) Inhalation daily  docusate sodium 100 milliGRAM(s) Oral three times a day  pantoprazole    Tablet 40 milliGRAM(s) Oral before breakfast  nicotine - 21 mG/24Hr(s) Patch 1 patch Transdermal daily  multivitamin 1 Tablet(s) Oral daily  heparin  Injectable 5000 Unit(s) SubCutaneous every 8 hours  sevelamer carbonate 800 milliGRAM(s) Oral three times a day with meals  senna 2 Tablet(s) Oral at bedtime      PHYSICAL EXAM:  General: NAD  Respiratory: b/l air entry  Cardiovascular: S1 S2  Gastrointestinal: soft  Extremities:  no edema                           11.2   5.5   )-----------( 126      ( 20 Jul 2017 10:59 )             33.4     20 Jul 2017 10:59    131    |  93     |  48     ----------------------------<  120    5.3     |  28     |  9.00     Ca    9.4        20 Jul 2017 10:59          Assessment and Plan:   · Assessment		  ·	ESRD on HD, tolerates well  ·	Continue HD TIW as scheduled on TTS  ·	Fluid removal as tolerated by BP  ·	D/c planning

## 2017-07-20 NOTE — PROGRESS NOTE ADULT - PROBLEM SELECTOR PLAN 6
incidental findings  pt does not have pain or discomfort  ortho eval for recommedation noted, no further intervention, outpt fu as needed
incidental findings  pt does not have pain or discomfort  ortho eval for recommedation

## 2017-07-20 NOTE — PROGRESS NOTE ADULT - SUBJECTIVE AND OBJECTIVE BOX
Neurology Follow up note    SABINE [id number]    HPI:  pt presents to hospital with complaints of frequent falling and unsteady gait, that has been getting worse, states his legs are weak and give out. he has been in and out of the hospital multiple times for this. today was getting ready to go to hd and fell after legs gave out, he has been using his walker more frequently lately, no cp or sob. gets hd on tues/thurs and sat. (18 Jul 2017 21:21)      Interval History - no new sx.    Patient is seen, chart was reviewed and case was discussed with the treatment team.  Pt is not in any distress.   Lying on bed comfortably.   No events reported overnight.   No clinical seizure was reported.  Sitting on chair bed comfortably.    is at bedside.    Vital Signs Last 24 Hrs  T(C): 36.7 (20 Jul 2017 09:35), Max: 36.7 (20 Jul 2017 09:35)  T(F): 98 (20 Jul 2017 09:35), Max: 98 (20 Jul 2017 09:35)  HR: 61 (20 Jul 2017 09:35) (61 - 92)  BP: 138/62 (20 Jul 2017 09:35) (106/61 - 150/67)  BP(mean): --  RR: 18 (20 Jul 2017 09:35) (18 - 18)  SpO2: 94% (20 Jul 2017 09:35) (92% - 98%)        REVIEW OF SYSTEMS:    Constitutional: No fever, weight loss or fatigue  Eyes: No eye pain, visual disturbances, or discharge  ENT:  No difficulty hearing, tinnitus, vertigo; No sinus or throat pain  Neck: No pain or stiffness  Respiratory: No cough, wheezing, chills or hemoptysis  Cardiovascular: No chest pain, palpitations, shortness of breath, dizziness or leg swelling  Gastrointestinal: No abdominal or epigastric pain. No nausea, vomiting or hematemesis; No diarrhea or constipation. No melena or hematochezia.  Genitourinary: No dysuria, frequency, hematuria or incontinence  Neurological: No headaches, memory loss, loss of strength, numbness or tremors  Psychiatric: No depression, anxiety, mood swings or difficulty sleeping  Musculoskeletal: No joint pain or swelling; No muscle, back or extremity pain  Skin: No itching, burning, rashes or lesions   Lymph Nodes: No enlarged glands  Endocrine: No heat or cold intolerance; No hair loss, No h/o diabetes or thyroid dysfunction  Allergy and Immunologic: No hives or eczema    On Neurological Examination:    Mental Status - Pt is alert, awake, oriented X3. . Follows commands well and able to answer questions appropriately.Mood and affect  normal    Speech -  Normal.     Cranial Nerves - Pupils 3 mm equal and reactive to light, extraocular eye movements intact. Pt has no visual field deficit.  Pt has no  facial asymmetry. Facial sensation is intact.Tongue - is in midline.    Muscle tone - is normal all over. Moves all extremities equally. No asymmetry is seen.      Motor Exam - 5-/5 of UE.  LE 4+/5.    Sensory Exam - . Pt withdraws all extremities equally on stimulation. No asymmetry seen. No complaints of tingling, numbness.    Gait - WALK WITH WALKER.          Finger to nose: normal.    Heel to shin.    Deep tendon Reflexes - 2 plus all over.        Romberg - Negative.    Neck Supple -  Yes.     MEDICATIONS    aspirin enteric coated 81 milliGRAM(s) Oral daily  acetaminophen   Tablet 500 milliGRAM(s) Oral four times a day PRN  acetaminophen   Tablet. 500 milliGRAM(s) Oral four times a day PRN  gabapentin 100 milliGRAM(s) Oral three times a day  traZODone 50 milliGRAM(s) Oral at bedtime  sertraline 100 milliGRAM(s) Oral daily  simvastatin 20 milliGRAM(s) Oral at bedtime  metoprolol 50 milliGRAM(s) Oral two times a day  tiotropium 18 MICROgram(s) Capsule 1 Capsule(s) Inhalation daily  docusate sodium 100 milliGRAM(s) Oral three times a day  pantoprazole    Tablet 40 milliGRAM(s) Oral before breakfast  nicotine - 21 mG/24Hr(s) Patch 1 patch Transdermal daily  multivitamin 1 Tablet(s) Oral daily  heparin  Injectable 5000 Unit(s) SubCutaneous every 8 hours  sevelamer carbonate 800 milliGRAM(s) Oral three times a day with meals  senna 2 Tablet(s) Oral at bedtime      Allergies    No Known Allergies    Intolerances        LABS:  CBC Full  -  ( 20 Jul 2017 10:59 )  WBC Count : 5.5 K/uL  Hemoglobin : 11.2 g/dL  Hematocrit : 33.4 %  Platelet Count - Automated : 126 K/uL  Mean Cell Volume : 94.4 fl  Mean Cell Hemoglobin : 31.7 pg  Mean Cell Hemoglobin Concentration : 33.5 gm/dL  Auto Neutrophil # : x  Auto Lymphocyte # : x  Auto Monocyte # : x  Auto Eosinophil # : x  Auto Basophil # : x  Auto Neutrophil % : x  Auto Lymphocyte % : x  Auto Monocyte % : x  Auto Eosinophil % : x  Auto Basophil % : x      07-20    131<L>  |  93<L>  |  48<H>  ----------------------------<  120<H>  5.3   |  28  |  9.00<H>    Ca    9.4      20 Jul 2017 10:59    TPro  7.9  /  Alb  3.7  /  TBili  0.6  /  DBili  x   /  AST  13<L>  /  ALT  11<L>  /  AlkPhos  96  07-18    Hemoglobin A1C:     Vitamin B12     RADIOLOGY    ASSESSMENT AND PLAN:      SP FALL.  NEUROPATHY.      Physical therapy evaluation.  OOB to chair/ambulation with assistance only.  Would continue to follow.  NEURO MAZARIEGOS STABLE FOR DC.

## 2017-07-20 NOTE — PROGRESS NOTE ADULT - SUBJECTIVE AND OBJECTIVE BOX
Interval history: + bm       HPI: 72 yo male admitted s/p fall with complaint of constipation. Patient states his last BM was 2 days ago. Normally has a bm once every 2 days. Denies blood in stools, abdominal pain, weight loss.     Allergies:  No Known Allergies      Medications:  aspirin enteric coated 81 milliGRAM(s) Oral daily  acetaminophen   Tablet 500 milliGRAM(s) Oral four times a day PRN  acetaminophen   Tablet. 500 milliGRAM(s) Oral four times a day PRN  gabapentin 100 milliGRAM(s) Oral three times a day  traZODone 50 milliGRAM(s) Oral at bedtime  sertraline 100 milliGRAM(s) Oral daily  simvastatin 20 milliGRAM(s) Oral at bedtime  metoprolol 50 milliGRAM(s) Oral two times a day  tiotropium 18 MICROgram(s) Capsule 1 Capsule(s) Inhalation daily  docusate sodium 100 milliGRAM(s) Oral three times a day  pantoprazole    Tablet 40 milliGRAM(s) Oral before breakfast  nicotine - 21 mG/24Hr(s) Patch 1 patch Transdermal daily  multivitamin 1 Tablet(s) Oral daily  heparin  Injectable 5000 Unit(s) SubCutaneous every 8 hours  sevelamer carbonate 800 milliGRAM(s) Oral three times a day with meals      PMHX/PSHX:  Hydrocephalus  Anemia  CAD (coronary artery disease)  DM (diabetes mellitus)  ESRD on peritoneal dialysis  COPD (chronic obstructive pulmonary disease)  S/P ventricular shunt placement  ESRD on dialysis  S/P angioplasty with stent  S/P coronary artery bypass graft x 3      Family history:  No pertinent family history in first degree relatives      Social History:     ROS:     General:  No wt loss, fevers, chills, night sweats, fatigue,   Eyes:  Good vision, no reported pain  ENT:  No sore throat, pain, runny nose, dysphagia  CV:  No pain, palpitations, hypo/hypertension  Resp:  No dyspnea, cough, tachypnea, wheezing  GI:  constipation  :  No pain, bleeding, incontinence, nocturia  Muscle:  No pain, weakness  Neuro:  No weakness, tingling, memory problems  Psych:  No fatigue, insomnia, mood problems, depression  Endocrine:  No polyuria, polydipsia, cold/heat intolerance  Heme:  No petechiae, ecchymosis, easy bruisability  Skin:  No rash, tattoos, scars, edema      PHYSICAL EXAM:   Vital Signs:  Vital Signs Last 24 Hrs  T(C): 36.6 (2017 13:49), Max: 36.9 (2017 15:47)  T(F): 97.9 (2017 13:49), Max: 98.5 (2017 20:55)  HR: 62 (2017 13:49) (62 - 66)  BP: 106/61 (2017 13:49) (106/61 - 149/73)  BP(mean): --  RR: 18 (2017 13:49) (15 - 19)  SpO2: 93% (2017 04:18) (93% - 100%)  Daily     Daily Weight in k.2 (2017 14:19)    GENERAL:  Appears stated age, well-groomed, well-nourished, no distress  HEENT:  NC/AT,  conjunctivae clear and pink, no thyromegaly, nodules, adenopathy, no JVD, sclera -anicteric  CHEST:  Full & symmetric excursion, no increased effort, breath sounds clear  HEART:  Regular rhythm, S1, S2, no murmur/rub/S3/S4, no abdominal bruit, no edema  ABDOMEN:  Soft, non-tender, non-distended, normoactive bowel sounds,  no masses ,no hepato-splenomegaly, no signs of chronic liver disease  EXTEREMITIES:  no cyanosis,clubbing or edema  SKIN:  No rash/erythema/ecchymoses/petechiae/wounds/abscess/warm/dry  NEURO:  Alert, oriented, no asterixis, no tremor, no encephalopathy    LABS:                        12.3   6.0   )-----------( 141      ( 2017 15:15 )             37.2     07-18    136  |  98  |  56<H>  ----------------------------<  84  6.0<H>   |  30  |  11.00<H>    Ca    9.4      2017 15:15    TPro  7.9  /  Alb  3.7  /  TBili  0.6  /  DBili  x   /  AST  13<L>  /  ALT  11<L>  /  AlkPhos  96  07-18    LIVER FUNCTIONS - ( 2017 15:15 )  Alb: 3.7 g/dL / Pro: 7.9 g/dL / ALK PHOS: 96 U/L / ALT: 11 U/L / AST: 13 U/L / GGT: x           PT/INR - ( 2017 15:15 )   PT: 10.9 sec;   INR: 1.00 ratio         PTT - ( 2017 15:15 )  PTT:30.6 sec        Imaging:

## 2017-07-20 NOTE — PROGRESS NOTE ADULT - SUBJECTIVE AND OBJECTIVE BOX
Patient is a 71y old  Male who presents with a chief complaint of fall (19 Jul 2017 14:19)      INTERVAL HPI/OVERNIGHT EVENTS:pt stable, s/p hd today, as per family would like pt to go to [facility]. for dc to [facility],no new complaints    MEDICATIONS  (STANDING):  aspirin enteric coated 81 milliGRAM(s) Oral daily  gabapentin 100 milliGRAM(s) Oral three times a day  traZODone 50 milliGRAM(s) Oral at bedtime  sertraline 100 milliGRAM(s) Oral daily  simvastatin 20 milliGRAM(s) Oral at bedtime  metoprolol 50 milliGRAM(s) Oral two times a day  tiotropium 18 MICROgram(s) Capsule 1 Capsule(s) Inhalation daily  docusate sodium 100 milliGRAM(s) Oral three times a day  pantoprazole    Tablet 40 milliGRAM(s) Oral before breakfast  nicotine - 21 mG/24Hr(s) Patch 1 patch Transdermal daily  multivitamin 1 Tablet(s) Oral daily  heparin  Injectable 5000 Unit(s) SubCutaneous every 8 hours  sevelamer carbonate 800 milliGRAM(s) Oral three times a day with meals  senna 2 Tablet(s) Oral at bedtime    MEDICATIONS  (PRN):  acetaminophen   Tablet 500 milliGRAM(s) Oral four times a day PRN For Temp greater than 38 C (100.4 F)  acetaminophen   Tablet. 500 milliGRAM(s) Oral four times a day PRN Mild Pain (1 - 3)      Allergies    No Known Allergies    Intolerances        REVIEW OF SYSTEMS:  CONSTITUTIONAL: No fever, weight loss, or fatigue  EYES: No eye pain, visual disturbances  ENMT:  No difficulty hearing, tinnitus, vertigo; No sinus or throat pain  NECK: No pain or stiffness  RESPIRATORY: No cough, wheezing, chills or hemoptysis; No shortness of breath  CARDIOVASCULAR: No chest pain, palpitations, dizziness  GASTROINTESTINAL: No abdominal or epigastric pain. No nausea, vomiting, or hematemesis; No diarrhea or constipation. No melena or hematochezia.  GENITOURINARY: No dysuria, frequency, hematuria, or incontinence  NEUROLOGICAL: No headaches, memory loss, loss of strength, numbness, or tremors  SKIN: No itching, burning  LYMPH NODES: No enlarged glands  MUSCULOSKELETAL: No joint pain or swelling; No muscle, back, or extremity pain  PSYCHIATRIC: No depression, mood swings  HEME/LYMPH: No easy bruising, or bleeding gums  ALLERGY AND IMMUNOLOGIC: No hives    Vital Signs Last 24 Hrs  T(C): 36.3 (20 Jul 2017 15:27), Max: 36.7 (20 Jul 2017 09:35)  T(F): 97.4 (20 Jul 2017 15:27), Max: 98 (20 Jul 2017 09:35)  HR: 61 (20 Jul 2017 17:12) (57 - 92)  BP: 122/73 (20 Jul 2017 17:12) (122/73 - 150/67)  BP(mean): --  RR: 18 (20 Jul 2017 17:12) (18 - 18)  SpO2: 95% (20 Jul 2017 17:12) (92% - 98%)    PHYSICAL EXAM:  GENERAL: NAD, well-groomed, well-developed  HEAD:  Atraumatic, Normocephalic  EYES: EOMI, PERRLA, conjunctiva and sclera clear  ENMT: No tonsillar erythema, exudates, or enlargement   NECK: Supple, No JVD  NERVOUS SYSTEM:  Alert & Oriented X3, Good concentration  CHEST/LUNG: Clear to auscultation bilaterally; No rales, rhonchi, wheezing  HEART: Regular rate and rhythm  ABDOMEN: Soft, Nontender, Nondistended; Bowel sounds present  EXTREMITIES:  2+ Peripheral Pulses   LYMPH: No lymphadenopathy noted  SKIN: No rashes     LABS:                        11.2   5.5   )-----------( 126      ( 20 Jul 2017 10:59 )             33.4     20 Jul 2017 10:59    131    |  93     |  48     ----------------------------<  120    5.3     |  28     |  9.00     Ca    9.4        20 Jul 2017 10:59          CAPILLARY BLOOD GLUCOSE                RADIOLOGY & ADDITIONAL TESTS:        Consultant(s) Notes Reviewed:  [x ] YES  [ ] NO    Care Discussed with Consultants/Other Providers [x ] YES  [ ] NO    Advanced Care Planning Discussed with Patien/Family [ ] YES  [ x] NO

## 2017-07-21 VITALS
RESPIRATION RATE: 18 BRPM | SYSTOLIC BLOOD PRESSURE: 132 MMHG | OXYGEN SATURATION: 94 % | HEART RATE: 64 BPM | DIASTOLIC BLOOD PRESSURE: 64 MMHG | TEMPERATURE: 98 F

## 2017-07-21 PROCEDURE — 82607 VITAMIN B-12: CPT

## 2017-07-21 PROCEDURE — 73562 X-RAY EXAM OF KNEE 3: CPT

## 2017-07-21 PROCEDURE — 99285 EMERGENCY DEPT VISIT HI MDM: CPT | Mod: 25

## 2017-07-21 PROCEDURE — 94640 AIRWAY INHALATION TREATMENT: CPT

## 2017-07-21 PROCEDURE — 83550 IRON BINDING TEST: CPT

## 2017-07-21 PROCEDURE — 96372 THER/PROPH/DIAG INJ SC/IM: CPT | Mod: 59

## 2017-07-21 PROCEDURE — 99261: CPT

## 2017-07-21 PROCEDURE — 70450 CT HEAD/BRAIN W/O DYE: CPT

## 2017-07-21 PROCEDURE — 82746 ASSAY OF FOLIC ACID SERUM: CPT

## 2017-07-21 PROCEDURE — 93005 ELECTROCARDIOGRAM TRACING: CPT

## 2017-07-21 PROCEDURE — 85610 PROTHROMBIN TIME: CPT

## 2017-07-21 PROCEDURE — 85730 THROMBOPLASTIN TIME PARTIAL: CPT

## 2017-07-21 PROCEDURE — 72040 X-RAY EXAM NECK SPINE 2-3 VW: CPT

## 2017-07-21 PROCEDURE — 80053 COMPREHEN METABOLIC PANEL: CPT

## 2017-07-21 PROCEDURE — 71045 X-RAY EXAM CHEST 1 VIEW: CPT

## 2017-07-21 PROCEDURE — 72125 CT NECK SPINE W/O DYE: CPT

## 2017-07-21 PROCEDURE — 80048 BASIC METABOLIC PNL TOTAL CA: CPT

## 2017-07-21 PROCEDURE — 97162 PT EVAL MOD COMPLEX 30 MIN: CPT

## 2017-07-21 PROCEDURE — 85027 COMPLETE CBC AUTOMATED: CPT

## 2017-07-21 PROCEDURE — 82728 ASSAY OF FERRITIN: CPT

## 2017-07-21 RX ADMIN — GABAPENTIN 100 MILLIGRAM(S): 400 CAPSULE ORAL at 05:32

## 2017-07-21 RX ADMIN — PANTOPRAZOLE SODIUM 40 MILLIGRAM(S): 20 TABLET, DELAYED RELEASE ORAL at 05:32

## 2017-07-21 RX ADMIN — SERTRALINE 100 MILLIGRAM(S): 25 TABLET, FILM COATED ORAL at 11:59

## 2017-07-21 RX ADMIN — Medication 1 TABLET(S): at 11:59

## 2017-07-21 RX ADMIN — Medication 1 PATCH: at 11:58

## 2017-07-21 RX ADMIN — Medication 1 PATCH: at 12:01

## 2017-07-21 RX ADMIN — SEVELAMER CARBONATE 800 MILLIGRAM(S): 2400 POWDER, FOR SUSPENSION ORAL at 11:58

## 2017-07-21 RX ADMIN — Medication 50 MILLIGRAM(S): at 05:32

## 2017-07-21 RX ADMIN — Medication 81 MILLIGRAM(S): at 11:59

## 2017-07-21 RX ADMIN — TIOTROPIUM BROMIDE 1 CAPSULE(S): 18 CAPSULE ORAL; RESPIRATORY (INHALATION) at 05:35

## 2017-07-21 RX ADMIN — SEVELAMER CARBONATE 800 MILLIGRAM(S): 2400 POWDER, FOR SUSPENSION ORAL at 08:27

## 2017-07-21 RX ADMIN — Medication 100 MILLIGRAM(S): at 05:32

## 2017-07-21 NOTE — PROGRESS NOTE ADULT - PROBLEM SELECTOR PLAN 1
+ bm  continue current bowel regimen
has hx of this  hx of nph with shunt  pt eval noted, for [facility] dc  neuro eval called and noted - no further intervention
has hx of this  hx of nph with shunt  pt eval noted, pt refusing monisha, will go home with home pt  neuro eval called and noted
+ bm  continue current bowel regimen

## 2017-07-21 NOTE — PROGRESS NOTE ADULT - SUBJECTIVE AND OBJECTIVE BOX
Interval history: + bm this morning and last night      HPI: 70 yo male admitted s/p fall with complaint of constipation. Patient states his last BM was 2 days ago. Normally has a bm once every 2 days. Denies blood in stools, abdominal pain, weight loss.     Allergies:  No Known Allergies      Medications:  aspirin enteric coated 81 milliGRAM(s) Oral daily  acetaminophen   Tablet 500 milliGRAM(s) Oral four times a day PRN  acetaminophen   Tablet. 500 milliGRAM(s) Oral four times a day PRN  gabapentin 100 milliGRAM(s) Oral three times a day  traZODone 50 milliGRAM(s) Oral at bedtime  sertraline 100 milliGRAM(s) Oral daily  simvastatin 20 milliGRAM(s) Oral at bedtime  metoprolol 50 milliGRAM(s) Oral two times a day  tiotropium 18 MICROgram(s) Capsule 1 Capsule(s) Inhalation daily  docusate sodium 100 milliGRAM(s) Oral three times a day  pantoprazole    Tablet 40 milliGRAM(s) Oral before breakfast  nicotine - 21 mG/24Hr(s) Patch 1 patch Transdermal daily  multivitamin 1 Tablet(s) Oral daily  heparin  Injectable 5000 Unit(s) SubCutaneous every 8 hours  sevelamer carbonate 800 milliGRAM(s) Oral three times a day with meals      PMHX/PSHX:  Hydrocephalus  Anemia  CAD (coronary artery disease)  DM (diabetes mellitus)  ESRD on peritoneal dialysis  COPD (chronic obstructive pulmonary disease)  S/P ventricular shunt placement  ESRD on dialysis  S/P angioplasty with stent  S/P coronary artery bypass graft x 3      Family history:  No pertinent family history in first degree relatives      Social History:     ROS:     General:  No wt loss, fevers, chills, night sweats, fatigue,   Eyes:  Good vision, no reported pain  ENT:  No sore throat, pain, runny nose, dysphagia  CV:  No pain, palpitations, hypo/hypertension  Resp:  No dyspnea, cough, tachypnea, wheezing  GI:  constipation  :  No pain, bleeding, incontinence, nocturia  Muscle:  No pain, weakness  Neuro:  No weakness, tingling, memory problems  Psych:  No fatigue, insomnia, mood problems, depression  Endocrine:  No polyuria, polydipsia, cold/heat intolerance  Heme:  No petechiae, ecchymosis, easy bruisability  Skin:  No rash, tattoos, scars, edema      PHYSICAL EXAM:   Vital Signs:  Vital Signs Last 24 Hrs  T(C): 36.6 (2017 13:49), Max: 36.9 (2017 15:47)  T(F): 97.9 (2017 13:49), Max: 98.5 (2017 20:55)  HR: 62 (2017 13:49) (62 - 66)  BP: 106/61 (2017 13:49) (106/61 - 149/73)  BP(mean): --  RR: 18 (2017 13:49) (15 - 19)  SpO2: 93% (2017 04:18) (93% - 100%)  Daily     Daily Weight in k.2 (2017 14:19)    GENERAL:  Appears stated age, well-groomed, well-nourished, no distress  HEENT:  NC/AT,  conjunctivae clear and pink, no thyromegaly, nodules, adenopathy, no JVD, sclera -anicteric  CHEST:  Full & symmetric excursion, no increased effort, breath sounds clear  HEART:  Regular rhythm, S1, S2, no murmur/rub/S3/S4, no abdominal bruit, no edema  ABDOMEN:  Soft, non-tender, non-distended, normoactive bowel sounds,  no masses ,no hepato-splenomegaly, no signs of chronic liver disease  EXTEREMITIES:  no cyanosis,clubbing or edema  SKIN:  No rash/erythema/ecchymoses/petechiae/wounds/abscess/warm/dry  NEURO:  Alert, oriented, no asterixis, no tremor, no encephalopathy    LABS:                        12.3   6.0   )-----------( 141      ( 2017 15:15 )             37.2     07-18    136  |  98  |  56<H>  ----------------------------<  84  6.0<H>   |  30  |  11.00<H>    Ca    9.4      2017 15:15    TPro  7.9  /  Alb  3.7  /  TBili  0.6  /  DBili  x   /  AST  13<L>  /  ALT  11<L>  /  AlkPhos  96  07-18    LIVER FUNCTIONS - ( 2017 15:15 )  Alb: 3.7 g/dL / Pro: 7.9 g/dL / ALK PHOS: 96 U/L / ALT: 11 U/L / AST: 13 U/L / GGT: x           PT/INR - ( 2017 15:15 )   PT: 10.9 sec;   INR: 1.00 ratio         PTT - ( 2017 15:15 )  PTT:30.6 sec        Imaging:

## 2017-07-21 NOTE — PROGRESS NOTE ADULT - PROBLEM SELECTOR PLAN 2
neuro eval called, no new findings
neuro eval called, no new findings
non iron deficient  no s/s of gi bleeding  monitor h/h, transfuse as needed  hematology f/u
non iron deficient  no s/s of gi bleeding  monitor h/h, transfuse as needed  hematology f/u

## 2017-07-21 NOTE — PROGRESS NOTE ADULT - SUBJECTIVE AND OBJECTIVE BOX
Progress Note:   · Provider Specialty	Neurology	      · Subjective and Objective: 	  Neurology Follow up note    SABINE JARA71yMale    HPI:  pt presents to hospital with complaints of frequent falling and unsteady gait, that has been getting worse, states his legs are weak and give out. he has been in and out of the hospital multiple times for this. today was getting ready to go to hd and fell after legs gave out, he has been using his walker more frequently lately, no cp or sob. gets hd on tues/thurs and sat. (18 Jul 2017 21:21)      Interval History - reported bowel movement last night.    Patient is seen, chart was reviewed and case was discussed with the treatment team.  Pt is not in any distress.   Lying on bed comfortably.   No events reported overnight.   No clinical seizure was reported.  Sitting on chair bed comfortably.    is at bedside.    Vital Signs Last 24 Hrs  T(C): 36.6 (21 Jul 2017 05:06), Max: 36.8 (20 Jul 2017 21:53)  T(F): 97.9 (21 Jul 2017 05:06), Max: 98.3 (20 Jul 2017 21:53)  HR: 64 (21 Jul 2017 05:06) (57 - 72)  BP: 132/64 (21 Jul 2017 05:06) (121/64 - 133/62)  BP(mean): --  RR: 18 (21 Jul 2017 05:06) (18 - 18)  SpO2: 94% (21 Jul 2017 05:06) (93% - 98%)        REVIEW OF SYSTEMS:    Constitutional: No fever, weight loss or fatigue  Eyes: No eye pain, visual disturbances, or discharge  ENT:  No difficulty hearing, tinnitus, vertigo; No sinus or throat pain  Neck: No pain or stiffness  Respiratory: No cough, wheezing, chills or hemoptysis  Cardiovascular: No chest pain, palpitations, shortness of breath, dizziness or leg swelling  Gastrointestinal: No abdominal or epigastric pain. No nausea, vomiting or hematemesis; No diarrhea or constipation. No melena or hematochezia.  Genitourinary: No dysuria, frequency, hematuria or incontinence  Neurological: No headaches, memory loss, loss of strength, numbness or tremors  Psychiatric: No depression, anxiety, mood swings or difficulty sleeping  Musculoskeletal: No joint pain or swelling; No muscle, back or extremity pain  Skin: No itching, burning, rashes or lesions   Lymph Nodes: No enlarged glands  Endocrine: No heat or cold intolerance; No hair loss, No h/o diabetes or thyroid dysfunction  Allergy and Immunologic: No hives or eczema    On Neurological Examination:    Mental Status - Pt is alert, awake, oriented X3. . Follows commands well and able to answer questions appropriately.Mood and affect  normal    Speech -  Normal.     Cranial Nerves - Pupils 3 mm equal and reactive to light, extraocular eye movements intact. Pt has no visual field deficit.  Pt has no  facial asymmetry. Facial sensation is intact.Tongue - is in midline.    Muscle tone - is normal all over. Moves all extremities equally. No asymmetry is seen.      Motor Exam - 5-/5 of UE.  LE 4+/5.    Sensory Exam - . Pt withdraws all extremities equally on stimulation. No asymmetry seen. No complaints of tingling, numbness.    Gait - WALK WITH WALKER.          Finger to nose: normal.    Heel to shin.    Deep tendon Reflexes - 2 plus all over.        Romberg - Negative.    Neck Supple -  Yes.     MEDICATIONS    aspirin enteric coated 81 milliGRAM(s) Oral daily  acetaminophen   Tablet 500 milliGRAM(s) Oral four times a day PRN  acetaminophen   Tablet. 500 milliGRAM(s) Oral four times a day PRN  gabapentin 100 milliGRAM(s) Oral three times a day  traZODone 50 milliGRAM(s) Oral at bedtime  sertraline 100 milliGRAM(s) Oral daily  simvastatin 20 milliGRAM(s) Oral at bedtime  metoprolol 50 milliGRAM(s) Oral two times a day  tiotropium 18 MICROgram(s) Capsule 1 Capsule(s) Inhalation daily  docusate sodium 100 milliGRAM(s) Oral three times a day  pantoprazole    Tablet 40 milliGRAM(s) Oral before breakfast  nicotine - 21 mG/24Hr(s) Patch 1 patch Transdermal daily  multivitamin 1 Tablet(s) Oral daily  heparin  Injectable 5000 Unit(s) SubCutaneous every 8 hours  sevelamer carbonate 800 milliGRAM(s) Oral three times a day with meals  senna 2 Tablet(s) Oral at bedtime      Allergies    No Known Allergies    Intolerances        LABS:  CBC Full  -  ( 20 Jul 2017 10:59 )  WBC Count : 5.5 K/uL  Hemoglobin : 11.2 g/dL  Hematocrit : 33.4 %  Platelet Count - Automated : 126 K/uL  Mean Cell Volume : 94.4 fl  Mean Cell Hemoglobin : 31.7 pg  Mean Cell Hemoglobin Concentration : 33.5 gm/dL  Auto Neutrophil # : x  Auto Lymphocyte # : x  Auto Monocyte # : x  Auto Eosinophil # : x  Auto Basophil # : x  Auto Neutrophil % : x  Auto Lymphocyte % : x  Auto Monocyte % : x  Auto Eosinophil % : x  Auto Basophil % : x      07-20    131<L>  |  93<L>  |  48<H>  ----------------------------<  120<H>  5.3   |  28  |  9.00<H>    Ca    9.4      20 Jul 2017 10:59    TPro  7.9  /  Alb  3.7  /  TBili  0.6  /  DBili  x   /  AST  13<L>  /  ALT  11<L>  /  AlkPhos  96  07-18    Hemoglobin A1C:     Vitamin B12     RADIOLOGY    ASSESSMENT AND PLAN:      SP FALL.  NEUROPATHY.  NPH SP  SHUNT.      Physical therapy evaluation.  OOB to chair/ambulation with assistance only.  Would continue to follow.  FOR LUIZ TODAY.  OUT PATIENT  NEURO FOLLOW UP.

## 2017-07-25 DIAGNOSIS — Z95.5 PRESENCE OF CORONARY ANGIOPLASTY IMPLANT AND GRAFT: ICD-10-CM

## 2017-07-25 DIAGNOSIS — S80.211A ABRASION, RIGHT KNEE, INITIAL ENCOUNTER: ICD-10-CM

## 2017-07-25 DIAGNOSIS — N18.6 END STAGE RENAL DISEASE: ICD-10-CM

## 2017-07-25 DIAGNOSIS — E87.5 HYPERKALEMIA: ICD-10-CM
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
## 2017-07-25 DIAGNOSIS — Z99.2 DEPENDENCE ON RENAL DIALYSIS: ICD-10-CM

## 2017-07-25 DIAGNOSIS — S80.212A ABRASION, LEFT KNEE, INITIAL ENCOUNTER: ICD-10-CM

## 2017-07-25 DIAGNOSIS — R26.81 UNSTEADINESS ON FEET: ICD-10-CM

## 2017-07-25 DIAGNOSIS — M41.9 SCOLIOSIS, UNSPECIFIED: ICD-10-CM

## 2017-07-25 DIAGNOSIS — J44.9 CHRONIC OBSTRUCTIVE PULMONARY DISEASE, UNSPECIFIED: ICD-10-CM

## 2017-07-25 DIAGNOSIS — Z95.1 PRESENCE OF AORTOCORONARY BYPASS GRAFT: ICD-10-CM

## 2017-07-25 DIAGNOSIS — I25.10 ATHEROSCLEROTIC HEART DISEASE OF NATIVE CORONARY ARTERY WITHOUT ANGINA PECTORIS: ICD-10-CM

## 2017-07-25 DIAGNOSIS — I50.9 HEART FAILURE, UNSPECIFIED: ICD-10-CM

## 2017-07-25 DIAGNOSIS — Z87.891 PERSONAL HISTORY OF NICOTINE DEPENDENCE: ICD-10-CM

## 2017-07-25 DIAGNOSIS — G62.9 POLYNEUROPATHY, UNSPECIFIED: ICD-10-CM

## 2017-07-25 DIAGNOSIS — I13.2 HYPERTENSIVE HEART AND CHRONIC KIDNEY DISEASE WITH HEART FAILURE AND WITH STAGE 5 CHRONIC KIDNEY DISEASE, OR END STAGE RENAL DISEASE: ICD-10-CM

## 2017-07-25 DIAGNOSIS — Z91.81 HISTORY OF FALLING: ICD-10-CM

## 2017-07-25 DIAGNOSIS — E11.22 TYPE 2 DIABETES MELLITUS WITH DIABETIC CHRONIC KIDNEY DISEASE: ICD-10-CM
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
## 2017-07-25 DIAGNOSIS — I73.9 PERIPHERAL VASCULAR DISEASE, UNSPECIFIED: ICD-10-CM

## 2017-07-25 DIAGNOSIS — M47.9 SPONDYLOSIS, UNSPECIFIED: ICD-10-CM
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
## 2017-07-25 DIAGNOSIS — K59.00 CONSTIPATION, UNSPECIFIED: ICD-10-CM

## 2017-07-25 DIAGNOSIS — G91.2 (IDIOPATHIC) NORMAL PRESSURE HYDROCEPHALUS: ICD-10-CM

## 2017-07-25 DIAGNOSIS — D64.9 ANEMIA, UNSPECIFIED: ICD-10-CM

## 2017-08-03 DIAGNOSIS — Z98.2 PRESENCE OF CEREBROSPINAL FLUID DRAINAGE DEVICE: ICD-10-CM

## 2017-08-03 DIAGNOSIS — I95.1 ORTHOSTATIC HYPOTENSION: ICD-10-CM

## 2017-11-22 NOTE — ED PROVIDER NOTE - GASTROINTESTINAL, MLM
Subjective:  pt seen and examined, no complaints on exam.   pt ROS -,    acetaminophen   Tablet. 650 milliGRAM(s) Oral every 6 hours PRN  apixaban 2.5 milliGRAM(s) Oral every 12 hours  aspirin  chewable 81 milliGRAM(s) Oral daily  atorvastatin 80 milliGRAM(s) Oral at bedtime  docusate sodium 100 milliGRAM(s) Oral two times a day  furosemide    Tablet 40 milliGRAM(s) Oral daily  levothyroxine 50 MICROGram(s) Oral daily  metoprolol     tartrate 12.5 milliGRAM(s) Oral two times a day  pantoprazole    Tablet 40 milliGRAM(s) Oral before breakfast  senna 2 Tablet(s) Oral at bedtime      Hemoglobin: 12.4 g/dL (11-18 @ 08:19)  Hemoglobin: 12.8 g/dL (11-17 @ 07:01)    Creatinine Trend: 1.00<--, 1.20<--, 1.43<--, 1.61<--, 1.16<--, 1.23<--    COAGS:     T(C): 36.2 (11-21-17 @ 21:02), Max: 36.7 (11-21-17 @ 05:38)  HR: 67 (11-21-17 @ 21:02) (61 - 74)  BP: 128/52 (11-21-17 @ 21:02) (121/51 - 130/64)  RR: 18 (11-21-17 @ 21:02) (16 - 18)  SpO2: 95% (11-21-17 @ 21:02) (95% - 99%)  Wt(kg): --    I&O's Summary    Appearance: Normal	  HEENT:   Normal oral mucosa, PERRL, EOMI	  Lymphatic: No lymphadenopathy , +  edema  Cardiovascular: irreg  S1 S2, No JVD, No murmurs , Peripheral pulses palpable 2+ bilaterally  Respiratory: Lungs clear to auscultation, normal effort 	  Gastrointestinal:  Soft, Non-tender, + BS	      TELEMETRY: 	 off     DIAGNOSTIC TESTING:  [ ] Echocardiogram:  < from: Transthoracic Echocardiogram (11.12.17 @ 07:37) >  CONCLUSIONS:  1. Densly calcified mitral valve leaflet, mitral annulus  calcification. Mild-moderate mitral regurgitation.  2. Calcified trileaflet aortic valve with normal opening.  Mild aortic regurgitation.  3. Aortic Root: 3.3 cm.  4. Mild left atrial enlargement.  5. Moderate concentric left ventricular hypertrophy.  6. Normal Left Ventricular Systolic Function,  (EF = 55 to  60%)  7. Grade II diastolic dysfunction.  8. Normal right atrium.  9. Right ventricle not well visualized.  10. RA Pressure is 10 mm Hg.  11. RV systolic pressure is 39 mm Hg.  12. There is mild tricuspid regurgitation.  13. Small pericardial effusion. No echocardiographic  evidence of pericardial tamponade.  14. Bilateral pleural effusions.    < end of copied text >    [ ]  Catheterization:  [ ] Stress Test:     OTHER: 	        ASSESSMENT/PLAN: 	89y Female hx of Afib, chol, htn, cad, chf, dementia , now resp distress, NSTEMI, chf excab    asa, statin , BB - HR stable rate  cont eliquis at present  for Afib  diuresis with now PO lasix - keep net neg daily    GI / DVT prophylaxis.   keep K>4, mag >2.0    renal follow up    D/W Dr Dailey Abdomen soft, non-tender, no guarding.

## 2018-02-12 ENCOUNTER — INPATIENT (INPATIENT)
Facility: HOSPITAL | Age: 73
LOS: 0 days | Discharge: HOME | DRG: 640 | End: 2018-02-13
Attending: HOSPITALIST | Admitting: HOSPITALIST
Payer: COMMERCIAL

## 2018-02-12 VITALS
RESPIRATION RATE: 32 BRPM | DIASTOLIC BLOOD PRESSURE: 62 MMHG | HEIGHT: 68 IN | SYSTOLIC BLOOD PRESSURE: 155 MMHG | TEMPERATURE: 98 F | OXYGEN SATURATION: 88 % | HEART RATE: 86 BPM | WEIGHT: 169.98 LBS

## 2018-02-12 DIAGNOSIS — N18.6 END STAGE RENAL DISEASE: ICD-10-CM

## 2018-02-12 DIAGNOSIS — Z98.2 PRESENCE OF CEREBROSPINAL FLUID DRAINAGE DEVICE: Chronic | ICD-10-CM

## 2018-02-12 LAB
ALBUMIN SERPL ELPH-MCNC: 3.7 G/DL — SIGNIFICANT CHANGE UP (ref 3.3–5)
ALP SERPL-CCNC: 107 U/L — SIGNIFICANT CHANGE UP (ref 40–120)
ALT FLD-CCNC: 19 U/L — SIGNIFICANT CHANGE UP (ref 12–78)
ANION GAP SERPL CALC-SCNC: 20 MMOL/L — HIGH (ref 5–17)
AST SERPL-CCNC: 18 U/L — SIGNIFICANT CHANGE UP (ref 15–37)
BASOPHILS # BLD AUTO: 0.1 K/UL — SIGNIFICANT CHANGE UP (ref 0–0.2)
BASOPHILS NFR BLD AUTO: 0.5 % — SIGNIFICANT CHANGE UP (ref 0–2)
BILIRUB SERPL-MCNC: 0.7 MG/DL — SIGNIFICANT CHANGE UP (ref 0.2–1.2)
BUN SERPL-MCNC: 133 MG/DL — HIGH (ref 7–23)
CALCIUM SERPL-MCNC: 9.2 MG/DL — SIGNIFICANT CHANGE UP (ref 8.5–10.1)
CHLORIDE SERPL-SCNC: 103 MMOL/L — SIGNIFICANT CHANGE UP (ref 96–108)
CO2 SERPL-SCNC: 15 MMOL/L — LOW (ref 22–31)
CREAT SERPL-MCNC: 15 MG/DL — HIGH (ref 0.5–1.3)
EOSINOPHIL # BLD AUTO: 0 K/UL — SIGNIFICANT CHANGE UP (ref 0–0.5)
EOSINOPHIL NFR BLD AUTO: 0.2 % — SIGNIFICANT CHANGE UP (ref 0–6)
GLUCOSE SERPL-MCNC: 149 MG/DL — HIGH (ref 70–99)
HCT VFR BLD CALC: 33.3 % — LOW (ref 39–50)
HGB BLD-MCNC: 10.9 G/DL — LOW (ref 13–17)
INR BLD: 1.16 RATIO — SIGNIFICANT CHANGE UP (ref 0.88–1.16)
LYMPHOCYTES # BLD AUTO: 0.6 K/UL — LOW (ref 1–3.3)
LYMPHOCYTES # BLD AUTO: 5.6 % — LOW (ref 13–44)
MCHC RBC-ENTMCNC: 32.2 PG — SIGNIFICANT CHANGE UP (ref 27–34)
MCHC RBC-ENTMCNC: 32.6 GM/DL — SIGNIFICANT CHANGE UP (ref 32–36)
MCV RBC AUTO: 98.7 FL — SIGNIFICANT CHANGE UP (ref 80–100)
MONOCYTES # BLD AUTO: 0.3 K/UL — SIGNIFICANT CHANGE UP (ref 0–0.9)
MONOCYTES NFR BLD AUTO: 2.4 % — SIGNIFICANT CHANGE UP (ref 1–9)
NEUTROPHILS # BLD AUTO: 9.9 K/UL — HIGH (ref 1.8–7.4)
NEUTROPHILS NFR BLD AUTO: 91.3 % — HIGH (ref 43–77)
PLATELET # BLD AUTO: 132 K/UL — LOW (ref 150–400)
POTASSIUM SERPL-MCNC: 6.4 MMOL/L — CRITICAL HIGH (ref 3.5–5.3)
POTASSIUM SERPL-SCNC: 6.4 MMOL/L — CRITICAL HIGH (ref 3.5–5.3)
PROT SERPL-MCNC: 8.2 G/DL — SIGNIFICANT CHANGE UP (ref 6–8.3)
PROTHROM AB SERPL-ACNC: 12.7 SEC — SIGNIFICANT CHANGE UP (ref 9.8–12.7)
RBC # BLD: 3.38 M/UL — LOW (ref 4.2–5.8)
RBC # FLD: 14.1 % — SIGNIFICANT CHANGE UP (ref 10.3–14.5)
SODIUM SERPL-SCNC: 138 MMOL/L — SIGNIFICANT CHANGE UP (ref 135–145)
WBC # BLD: 10.8 K/UL — HIGH (ref 3.8–10.5)
WBC # FLD AUTO: 10.8 K/UL — HIGH (ref 3.8–10.5)

## 2018-02-12 PROCEDURE — 99223 1ST HOSP IP/OBS HIGH 75: CPT | Mod: AI

## 2018-02-12 PROCEDURE — 99285 EMERGENCY DEPT VISIT HI MDM: CPT | Mod: 25

## 2018-02-12 PROCEDURE — 71045 X-RAY EXAM CHEST 1 VIEW: CPT | Mod: 26

## 2018-02-12 PROCEDURE — 93010 ELECTROCARDIOGRAM REPORT: CPT

## 2018-02-12 RX ORDER — DEXTROSE 50 % IN WATER 50 %
25 SYRINGE (ML) INTRAVENOUS ONCE
Qty: 0 | Refills: 0 | Status: DISCONTINUED | OUTPATIENT
Start: 2018-02-12 | End: 2018-02-13

## 2018-02-12 RX ORDER — GLUCAGON INJECTION, SOLUTION 0.5 MG/.1ML
1 INJECTION, SOLUTION SUBCUTANEOUS ONCE
Qty: 0 | Refills: 0 | Status: DISCONTINUED | OUTPATIENT
Start: 2018-02-12 | End: 2018-02-13

## 2018-02-12 RX ORDER — ACETAMINOPHEN 500 MG
650 TABLET ORAL EVERY 6 HOURS
Qty: 0 | Refills: 0 | Status: DISCONTINUED | OUTPATIENT
Start: 2018-02-12 | End: 2018-02-13

## 2018-02-12 RX ORDER — SODIUM CHLORIDE 9 MG/ML
1000 INJECTION, SOLUTION INTRAVENOUS
Qty: 0 | Refills: 0 | Status: DISCONTINUED | OUTPATIENT
Start: 2018-02-12 | End: 2018-02-13

## 2018-02-12 RX ORDER — SODIUM POLYSTYRENE SULFONATE 4.1 MEQ/G
30 POWDER, FOR SUSPENSION ORAL ONCE
Qty: 0 | Refills: 0 | Status: COMPLETED | OUTPATIENT
Start: 2018-02-12 | End: 2018-02-12

## 2018-02-12 RX ORDER — DEXTROSE 50 % IN WATER 50 %
1 SYRINGE (ML) INTRAVENOUS ONCE
Qty: 0 | Refills: 0 | Status: DISCONTINUED | OUTPATIENT
Start: 2018-02-12 | End: 2018-02-13

## 2018-02-12 RX ORDER — SERTRALINE 25 MG/1
1 TABLET, FILM COATED ORAL
Qty: 0 | Refills: 0 | COMMUNITY

## 2018-02-12 RX ORDER — TRAZODONE HCL 50 MG
1 TABLET ORAL
Qty: 0 | Refills: 0 | COMMUNITY

## 2018-02-12 RX ORDER — ALBUTEROL 90 UG/1
0 AEROSOL, METERED ORAL
Qty: 0 | Refills: 0 | COMMUNITY

## 2018-02-12 RX ORDER — GABAPENTIN 400 MG/1
100 CAPSULE ORAL THREE TIMES A DAY
Qty: 0 | Refills: 0 | Status: DISCONTINUED | OUTPATIENT
Start: 2018-02-12 | End: 2018-02-13

## 2018-02-12 RX ORDER — METOPROLOL TARTRATE 50 MG
50 TABLET ORAL DAILY
Qty: 0 | Refills: 0 | Status: DISCONTINUED | OUTPATIENT
Start: 2018-02-12 | End: 2018-02-13

## 2018-02-12 RX ORDER — IPRATROPIUM/ALBUTEROL SULFATE 18-103MCG
3 AEROSOL WITH ADAPTER (GRAM) INHALATION ONCE
Qty: 0 | Refills: 0 | Status: COMPLETED | OUTPATIENT
Start: 2018-02-12 | End: 2018-02-12

## 2018-02-12 RX ORDER — SODIUM BICARBONATE 1 MEQ/ML
50 SYRINGE (ML) INTRAVENOUS ONCE
Qty: 0 | Refills: 0 | Status: COMPLETED | OUTPATIENT
Start: 2018-02-12 | End: 2018-02-12

## 2018-02-12 RX ORDER — IPRATROPIUM/ALBUTEROL SULFATE 18-103MCG
3 AEROSOL WITH ADAPTER (GRAM) INHALATION EVERY 6 HOURS
Qty: 0 | Refills: 0 | Status: DISCONTINUED | OUTPATIENT
Start: 2018-02-12 | End: 2018-02-13

## 2018-02-12 RX ORDER — PANTOPRAZOLE SODIUM 20 MG/1
40 TABLET, DELAYED RELEASE ORAL
Qty: 0 | Refills: 0 | Status: DISCONTINUED | OUTPATIENT
Start: 2018-02-12 | End: 2018-02-13

## 2018-02-12 RX ORDER — ACETAMINOPHEN 500 MG
0 TABLET ORAL
Qty: 0 | Refills: 0 | COMMUNITY

## 2018-02-12 RX ORDER — NICOTINE POLACRILEX 2 MG
1 GUM BUCCAL DAILY
Qty: 0 | Refills: 0 | Status: DISCONTINUED | OUTPATIENT
Start: 2018-02-12 | End: 2018-02-13

## 2018-02-12 RX ORDER — INSULIN HUMAN 100 [IU]/ML
6 INJECTION, SOLUTION SUBCUTANEOUS ONCE
Qty: 0 | Refills: 0 | Status: COMPLETED | OUTPATIENT
Start: 2018-02-12 | End: 2018-02-12

## 2018-02-12 RX ORDER — ATORVASTATIN CALCIUM 80 MG/1
40 TABLET, FILM COATED ORAL AT BEDTIME
Qty: 0 | Refills: 0 | Status: DISCONTINUED | OUTPATIENT
Start: 2018-02-12 | End: 2018-02-13

## 2018-02-12 RX ORDER — SERTRALINE 25 MG/1
100 TABLET, FILM COATED ORAL DAILY
Qty: 0 | Refills: 0 | Status: DISCONTINUED | OUTPATIENT
Start: 2018-02-12 | End: 2018-02-13

## 2018-02-12 RX ORDER — FERRIC CITRATE 210 MG/1
0 TABLET, COATED ORAL
Qty: 0 | Refills: 0 | COMMUNITY

## 2018-02-12 RX ORDER — ONDANSETRON 8 MG/1
4 TABLET, FILM COATED ORAL EVERY 6 HOURS
Qty: 0 | Refills: 0 | Status: DISCONTINUED | OUTPATIENT
Start: 2018-02-12 | End: 2018-02-13

## 2018-02-12 RX ORDER — FERRIC CITRATE 210 MG/1
1 TABLET, COATED ORAL
Qty: 0 | Refills: 0 | COMMUNITY

## 2018-02-12 RX ORDER — GABAPENTIN 400 MG/1
1 CAPSULE ORAL
Qty: 0 | Refills: 0 | COMMUNITY

## 2018-02-12 RX ORDER — DEXTROSE 50 % IN WATER 50 %
50 SYRINGE (ML) INTRAVENOUS ONCE
Qty: 0 | Refills: 0 | Status: COMPLETED | OUTPATIENT
Start: 2018-02-12 | End: 2018-02-12

## 2018-02-12 RX ORDER — MAGNESIUM OXIDE 400 MG ORAL TABLET 241.3 MG
400 TABLET ORAL DAILY
Qty: 0 | Refills: 0 | Status: DISCONTINUED | OUTPATIENT
Start: 2018-02-12 | End: 2018-02-13

## 2018-02-12 RX ORDER — HEPARIN SODIUM 5000 [USP'U]/ML
5000 INJECTION INTRAVENOUS; SUBCUTANEOUS EVERY 8 HOURS
Qty: 0 | Refills: 0 | Status: DISCONTINUED | OUTPATIENT
Start: 2018-02-12 | End: 2018-02-13

## 2018-02-12 RX ORDER — INSULIN LISPRO 100/ML
VIAL (ML) SUBCUTANEOUS AT BEDTIME
Qty: 0 | Refills: 0 | Status: DISCONTINUED | OUTPATIENT
Start: 2018-02-12 | End: 2018-02-13

## 2018-02-12 RX ORDER — INSULIN LISPRO 100/ML
VIAL (ML) SUBCUTANEOUS
Qty: 0 | Refills: 0 | Status: DISCONTINUED | OUTPATIENT
Start: 2018-02-12 | End: 2018-02-13

## 2018-02-12 RX ORDER — ROSUVASTATIN CALCIUM 5 MG/1
1 TABLET ORAL
Qty: 0 | Refills: 0 | COMMUNITY

## 2018-02-12 RX ORDER — ASPIRIN/CALCIUM CARB/MAGNESIUM 324 MG
81 TABLET ORAL EVERY OTHER DAY
Qty: 0 | Refills: 0 | Status: DISCONTINUED | OUTPATIENT
Start: 2018-02-12 | End: 2018-02-13

## 2018-02-12 RX ORDER — DEXTROSE 50 % IN WATER 50 %
12.5 SYRINGE (ML) INTRAVENOUS ONCE
Qty: 0 | Refills: 0 | Status: DISCONTINUED | OUTPATIENT
Start: 2018-02-12 | End: 2018-02-13

## 2018-02-12 RX ORDER — OMEGA-3 ACID ETHYL ESTERS 1 G
3 CAPSULE ORAL DAILY
Qty: 0 | Refills: 0 | Status: DISCONTINUED | OUTPATIENT
Start: 2018-02-12 | End: 2018-02-13

## 2018-02-12 RX ORDER — DOCUSATE SODIUM 100 MG
100 CAPSULE ORAL THREE TIMES A DAY
Qty: 0 | Refills: 0 | Status: DISCONTINUED | OUTPATIENT
Start: 2018-02-12 | End: 2018-02-13

## 2018-02-12 RX ORDER — BUPROPION HYDROCHLORIDE 150 MG/1
1 TABLET, EXTENDED RELEASE ORAL
Qty: 0 | Refills: 0 | COMMUNITY

## 2018-02-12 RX ORDER — OMEGA-3 ACID ETHYL ESTERS 1 G
3000 CAPSULE ORAL
Qty: 0 | Refills: 0 | COMMUNITY

## 2018-02-12 RX ORDER — TIOTROPIUM BROMIDE 18 UG/1
1 CAPSULE ORAL; RESPIRATORY (INHALATION) DAILY
Qty: 0 | Refills: 0 | Status: DISCONTINUED | OUTPATIENT
Start: 2018-02-12 | End: 2018-02-13

## 2018-02-12 RX ADMIN — Medication 3 MILLILITER(S): at 06:46

## 2018-02-12 RX ADMIN — GABAPENTIN 100 MILLIGRAM(S): 400 CAPSULE ORAL at 15:01

## 2018-02-12 RX ADMIN — GABAPENTIN 100 MILLIGRAM(S): 400 CAPSULE ORAL at 22:22

## 2018-02-12 RX ADMIN — Medication 100 MILLIGRAM(S): at 22:22

## 2018-02-12 RX ADMIN — HEPARIN SODIUM 5000 UNIT(S): 5000 INJECTION INTRAVENOUS; SUBCUTANEOUS at 15:01

## 2018-02-12 RX ADMIN — Medication 650 MILLIGRAM(S): at 14:25

## 2018-02-12 RX ADMIN — INSULIN HUMAN 6 UNIT(S): 100 INJECTION, SOLUTION SUBCUTANEOUS at 08:29

## 2018-02-12 RX ADMIN — Medication 50 MILLIEQUIVALENT(S): at 08:30

## 2018-02-12 RX ADMIN — Medication 650 MILLIGRAM(S): at 14:55

## 2018-02-12 RX ADMIN — ATORVASTATIN CALCIUM 40 MILLIGRAM(S): 80 TABLET, FILM COATED ORAL at 22:22

## 2018-02-12 RX ADMIN — Medication 50 MILLILITER(S): at 08:30

## 2018-02-12 RX ADMIN — SODIUM POLYSTYRENE SULFONATE 30 GRAM(S): 4.1 POWDER, FOR SUSPENSION ORAL at 08:29

## 2018-02-12 RX ADMIN — Medication 100 MILLIGRAM(S): at 15:01

## 2018-02-12 RX ADMIN — HEPARIN SODIUM 5000 UNIT(S): 5000 INJECTION INTRAVENOUS; SUBCUTANEOUS at 22:22

## 2018-02-12 NOTE — ED ADULT TRIAGE NOTE - CHIEF COMPLAINT QUOTE
c/o difficulty breathing * couple of weeks, c/o wheeezing since saturday - was 84% on room air, last dialysis was on thursday- missed dialysis on saturday- patient on cpap

## 2018-02-12 NOTE — H&P ADULT - HISTORY OF PRESENT ILLNESS
71y/o M. with hx of DM (diet controlled), Anemia, CAD, COPD, hydrocephalus, and ESRD on HD T-Th-Sat presenting with SOB since yesterday.  Pt. had missed his HD session this past Thursday and Saturday.  He reports that he has been on HD for about 7 years and is tired of it.  He was placed on BiPAP with improvement.  Pt. otherwise denies having fever, cough, CP, N/V, abdominal pain, diarrhea, or dysuria.  At this time produces minimal urine.  Rest of ROS negative.  In ED noted to have potassium 6.4.  BUN/Cr 133/15 CXR consistent with pulmonary vascular congestion.

## 2018-02-12 NOTE — ED PROVIDER NOTE - OBJECTIVE STATEMENT
71yo male bib ems with dyspnea this morning, pt gets dialysis tues thurs sat and decided to skip saturday and woke up with sob, no cough, fever, chills, pt was put on bipap by ems with improvement, pt also admits to still smoking

## 2018-02-12 NOTE — ED ADULT NURSE NOTE - OBJECTIVE STATEMENT
patient a/o x 4 with a calm affect arrived to ED a respiratory distress via [facility].  patient states he didn't feel like going to dialysis on Saturday and now has audible wheezes with difficulty breathing.  patient also states he smokes.  patient placed immediately on BiPap by Respiratory

## 2018-02-12 NOTE — CONSULT NOTE ADULT - ASSESSMENT
·	ESRD on HD  ·	Hyperkalemia  ·	Fluid overload  ·	Depression     To continue HD TIW as scheduled. Fluid removal as tolerated by BP. Pt agreeable for HD today. Signed consent. Psych evaluation.  Pt advised on compliance with dialysis schedule and dietary compliance. Dietary and PO fluid restriction. Epogen as needed for anemia as needed.   Monitor BP trend. Titrate BP meds as needed. Salt restriction.   Further recommendations pending clinical course. Thank you for the courtesy of this referral.

## 2018-02-12 NOTE — ED ADULT NURSE REASSESSMENT NOTE - NS ED NURSE REASSESS COMMENT FT1
Received patient from dialysis at 1450 and " I feel better " as stated,on bed withrails up while waiting room assignment.

## 2018-02-12 NOTE — ED PROVIDER NOTE - MUSCULOSKELETAL, MLM
Spine appears normal, range of motion is not limited, no muscle or joint tenderness +right AV fistula, +bruit, +thrill

## 2018-02-12 NOTE — CONSULT NOTE ADULT - SUBJECTIVE AND OBJECTIVE BOX
Patient is a 72y old  Male who presents with a chief complaint of SOB (12 Feb 2018 08:16)    HPI:  73y/o M. with hx of DM (diet controlled), Anemia, CAD, COPD, hydrocephalus, and ESRD on HD T-Th-Sat presenting with SOB since yesterday.  Pt. had missed his HD session this past Thursday and Saturday.  He reports that he has been on HD for about 7 years and is tired of it.  He was placed on BiPAP with improvement.  Pt. otherwise denies having fever, cough, CP, N/V, abdominal pain, diarrhea, or dysuria.  At this time produces minimal urine.  Rest of ROS negative.  In ED noted to have potassium 6.4.  BUN/Cr 133/15 CXR consistent with pulmonary vascular congestion. (12 Feb 2018 08:16)    Renal consult called for ESRD on HD, Hyperkalemia. Pt missed two dialysis treatments and is depressed. Wanted to stop dialysis but came to ER because of SOB. Pt willing to get HD today and signed consent for dialysis.   Pt was noted to have high potassium on initial labs. CXR: vascular congestion. Pt was placed on BIPAP.       PAST MEDICAL HISTORY:  Hydrocephalus  Anemia  CAD (coronary artery disease)  DM (diabetes mellitus)  ESRD on peritoneal dialysis  COPD (chronic obstructive pulmonary disease)      PAST SURGICAL HISTORY:  S/P ventricular shunt placement  ESRD on dialysis  S/P angioplasty with stent  S/P coronary artery bypass graft x 3      FAMILY HISTORY:  No pertinent family history in first degree relatives      SOCIAL HISTORY: No smoking or alcohol use     Allergies    No Known Allergies    Intolerances      Home Medications:  albuterol 2.5 mg/3 mL (0.083%) inhalation solution: 3 milliliter(s) inhaled every 6 hours, As Needed (12 Feb 2018 08:47)  albuterol CFC free 90 mcg/inh inhalation aerosol: 2 puff(s) inhaled every 4 hours, As Needed (12 Feb 2018 08:47)  Aspirin Enteric Coated 81 mg oral delayed release tablet: 1 tab(s) orally every other day (12 Feb 2018 09:42)  Auryxia 210 mg oral tablet: 1 tab(s) orally once a day (12 Feb 2018 09:40)  Colace 100 mg oral capsule: 1 cap(s) orally 3 times a day (12 Feb 2018 09:20)  Fish Oil: 3000 milligram(s) orally once a day (12 Feb 2018 09:20)  gabapentin 100 mg oral capsule: 1 cap(s) orally 3 times a day (12 Feb 2018 09:20)  magnesium oxide 400 mg (241.3 mg elemental magnesium) oral tablet: 1 tab(s) orally once a day (12 Feb 2018 09:20)  metoprolol tartrate 50 mg oral tablet: 1 tab(s) orally once a day (12 Feb 2018 09:39)  nicotine 14 mg/24 hr transdermal film, extended release: 1 patch transdermal once a day (12 Feb 2018 09:20)  nicotine 21 mg/24 hr transdermal film, extended release: 1 patch transdermal once a day (12 Feb 2018 09:20)  nicotine 7 mg/24 hr transdermal film, extended release: 1 patch transdermal once a day (12 Feb 2018 09:20)  omeprazole 20 mg oral delayed release tablet: 2 tab(s) orally once a day (12 Feb 2018 09:20)  Lindsey-Sarita oral tablet: 1 tab(s) orally once a day (12 Feb 2018 09:20)  rosuvastatin 10 mg oral tablet: 1 tab(s) orally once a day (at bedtime) (12 Feb 2018 09:20)  tiotropium 18 mcg inhalation capsule: 1 cap(s) inhaled once a day (12 Feb 2018 09:20)  Tylenol 500 mg oral tablet: 2 tab(s) orally , As Needed (12 Feb 2018 08:47)  Zoloft 100 mg oral tablet: 1 tab(s) orally once a day (12 Feb 2018 08:47)    MEDICATIONS  (STANDING):  aspirin enteric coated 81 milliGRAM(s) Oral every other day  atorvastatin 40 milliGRAM(s) Oral at bedtime  Auryxia 210 milliGRAM(s)   Oral daily  dextrose 5%. 1000 milliLiter(s) (50 mL/Hr) IV Continuous <Continuous>  dextrose 50% Injectable 12.5 Gram(s) IV Push once  dextrose 50% Injectable 25 Gram(s) IV Push once  dextrose 50% Injectable 25 Gram(s) IV Push once  docusate sodium 100 milliGRAM(s) Oral three times a day  gabapentin 100 milliGRAM(s) Oral three times a day  heparin  Injectable 5000 Unit(s) SubCutaneous every 8 hours  insulin lispro (HumaLOG) corrective regimen sliding scale   SubCutaneous three times a day before meals  insulin lispro (HumaLOG) corrective regimen sliding scale   SubCutaneous at bedtime  magnesium oxide 400 milliGRAM(s) Oral daily  metoprolol     tartrate 50 milliGRAM(s) Oral daily  Nephro-sarita 1 Tablet(s) Oral daily  nicotine - 21 mG/24Hr(s) Patch 1 patch Transdermal daily  omega-3-Acid Ethyl Esters 3 Gram(s) Oral daily  pantoprazole    Tablet 40 milliGRAM(s) Oral before breakfast  sertraline 100 milliGRAM(s) Oral daily  tiotropium 18 MICROgram(s) Capsule 1 Capsule(s) Inhalation daily    MEDICATIONS  (PRN):  acetaminophen   Tablet 650 milliGRAM(s) Oral every 6 hours PRN For Temp greater than 38 C (100.4 F)  acetaminophen   Tablet. 650 milliGRAM(s) Oral every 6 hours PRN Mild Pain (1 - 3)  ALBUTerol/ipratropium for Nebulization 3 milliLiter(s) Nebulizer every 6 hours PRN Shortness of Breath and/or Wheezing  dextrose Gel 1 Dose(s) Oral once PRN Blood Glucose LESS THAN 70 milliGRAM(s)/deciliter  glucagon  Injectable 1 milliGRAM(s) IntraMuscular once PRN Glucose LESS THAN 70 milligrams/deciliter  ondansetron Injectable 4 milliGRAM(s) IV Push every 6 hours PRN Nausea      REVIEW OF SYSTEMS:  General: on BIPAP  Respiratory: + SOB  Cardiovascular: No CP or Palpitations	  Gastrointestinal: No nausea, Vomiting. No diarrhea  Genitourinary: No urinary complaints	  Musculoskeletal: No new rash or lesions	  all other systems negative    T(F): 98 (02-12-18 @ 08:27), Max: 98 (02-12-18 @ 08:27)  HR: 70 (02-12-18 @ 08:29) (65 - 86)  BP: 140/75 (02-12-18 @ 08:27) (140/75 - 155/62)  RR: 21 (02-12-18 @ 08:27) (21 - 36)  SpO2: 97% (02-12-18 @ 08:29) (88% - 97%)  Wt(kg): --    PHYSICAL EXAM:  General: on BIPAP  Respiratory: b/l air entry, basal rales  Cardiovascular: S1 S2  Gastrointestinal: soft  Extremities: edema, Rt AVF        02-12    138  |  103  |  133<H>  ----------------------------<  149<H>  6.4<HH>   |  15<L>  |  15.00<H>    Ca    9.2      12 Feb 2018 06:44    TPro  8.2  /  Alb  3.7  /  TBili  0.7  /  DBili  x   /  AST  18  /  ALT  19  /  AlkPhos  107  02-12                          10.9   10.8  )-----------( 132      ( 12 Feb 2018 06:44 )             33.3       Potassium, Serum: 6.4 mmol/L (02-12 @ 06:44)  Blood Urea Nitrogen, Serum: 133 mg/dL (02-12 @ 06:44)  Calcium, Total Serum: 9.2 mg/dL (02-12 @ 06:44)  Hemoglobin: 10.9 g/dL (02-12 @ 06:44)      Creatinine, Serum: 15.00 (02-12 @ 06:44)        LIVER FUNCTIONS - ( 12 Feb 2018 06:44 )  Alb: 3.7 g/dL / Pro: 8.2 g/dL / ALK PHOS: 107 U/L / ALT: 19 U/L / AST: 18 U/L / GGT: x                 < from: Xray Chest 1 View AP/PA (02.12.18 @ 06:57) >  EXAM:  XR CHEST AP OR PA 1V                            PROCEDURE DATE:  02/12/2018          INTERPRETATION:  Chest portable semierect single AP view:    Clinical history:    Shortness of breath.    Findings:    Cardiomegaly. Median sternotomy. Evidence of CABG. Pulmonary venous   congestion. CP angles appear normal. No pneumothorax. Osteopenia.    Impression:    Findings suggestive of congestive heart failure with interstitial   pulmonary venous congestion. Clinical and lab correlation requested.    < end of copied text >

## 2018-02-12 NOTE — H&P ADULT - ASSESSMENT
73y/o M. with hx of type 2 DM (diet controlled), Anemia, CAD, COPD, hydrocephalus, and ESRD on HD T-Th-Sat presenting with SOB/fluid overload after missing 2 sessions of HD.    -SOB/Fluid overload/ESRD:  HD to be scheduled by Nephrology for fluid removal.    -Hyperkalemia: Pt. will receive amp of dextrose, regular insulin 6 units, amp of sodiium bicarbonate, and kayexalate.  Pt. to have hemodialysis today.   -Type 2 DM: will start low dose humalog sliding scale and check hbgA1c.  -Anemia:  stable.  Will continue to monitor.   -CAD: continue ASA, B-blocker, and statin.    -COPD:  Will continue maintenance inhalers and duoneb tx PRN.  -Nicotine addiction: nicotine 21mg patch daily  -VTE ppx: heparin 5000 units SQ Q8h    IMPROVE VTE Individual Risk Assessment          RISK                                                          Points  [  ] Previous VTE                                                3  [  ] Thrombophilia                                             2  [  ] Lower limb paralysis                                   2        (unable to hold up >15 seconds)    [  ] Current Cancer                                             2         (within 6 months)  [  ] Immobilization > 24 hrs                              1  [  ] ICU/CCU stay > 24 hours                             1  [ x ] Age > 60                                                         1    IMPROVE VTE Score: 1

## 2018-02-13 ENCOUNTER — TRANSCRIPTION ENCOUNTER (OUTPATIENT)
Age: 73
End: 2018-02-13

## 2018-02-13 VITALS — WEIGHT: 172.62 LBS

## 2018-02-13 DIAGNOSIS — E11.9 TYPE 2 DIABETES MELLITUS WITHOUT COMPLICATIONS: ICD-10-CM

## 2018-02-13 DIAGNOSIS — F32.9 MAJOR DEPRESSIVE DISORDER, SINGLE EPISODE, UNSPECIFIED: ICD-10-CM

## 2018-02-13 DIAGNOSIS — Z29.9 ENCOUNTER FOR PROPHYLACTIC MEASURES, UNSPECIFIED: ICD-10-CM

## 2018-02-13 DIAGNOSIS — N18.6 END STAGE RENAL DISEASE: ICD-10-CM

## 2018-02-13 DIAGNOSIS — E87.5 HYPERKALEMIA: ICD-10-CM

## 2018-02-13 DIAGNOSIS — D64.9 ANEMIA, UNSPECIFIED: ICD-10-CM

## 2018-02-13 DIAGNOSIS — E87.79 OTHER FLUID OVERLOAD: ICD-10-CM

## 2018-02-13 DIAGNOSIS — J44.9 CHRONIC OBSTRUCTIVE PULMONARY DISEASE, UNSPECIFIED: ICD-10-CM

## 2018-02-13 DIAGNOSIS — I25.10 ATHEROSCLEROTIC HEART DISEASE OF NATIVE CORONARY ARTERY WITHOUT ANGINA PECTORIS: ICD-10-CM

## 2018-02-13 LAB
ANION GAP SERPL CALC-SCNC: 13 MMOL/L — SIGNIFICANT CHANGE UP (ref 5–17)
BASOPHILS # BLD AUTO: 0.1 K/UL — SIGNIFICANT CHANGE UP (ref 0–0.2)
BASOPHILS NFR BLD AUTO: 0.9 % — SIGNIFICANT CHANGE UP (ref 0–2)
BUN SERPL-MCNC: 63 MG/DL — HIGH (ref 7–23)
CALCIUM SERPL-MCNC: 8.9 MG/DL — SIGNIFICANT CHANGE UP (ref 8.5–10.1)
CHLORIDE SERPL-SCNC: 98 MMOL/L — SIGNIFICANT CHANGE UP (ref 96–108)
CO2 SERPL-SCNC: 29 MMOL/L — SIGNIFICANT CHANGE UP (ref 22–31)
CREAT SERPL-MCNC: 9.6 MG/DL — HIGH (ref 0.5–1.3)
EOSINOPHIL # BLD AUTO: 0.2 K/UL — SIGNIFICANT CHANGE UP (ref 0–0.5)
EOSINOPHIL NFR BLD AUTO: 3.1 % — SIGNIFICANT CHANGE UP (ref 0–6)
GLUCOSE SERPL-MCNC: 93 MG/DL — SIGNIFICANT CHANGE UP (ref 70–99)
HBA1C BLD-MCNC: 5.3 % — SIGNIFICANT CHANGE UP (ref 4–5.6)
HCT VFR BLD CALC: 28.2 % — LOW (ref 39–50)
HGB BLD-MCNC: 9.4 G/DL — LOW (ref 13–17)
LYMPHOCYTES # BLD AUTO: 1.4 K/UL — SIGNIFICANT CHANGE UP (ref 1–3.3)
LYMPHOCYTES # BLD AUTO: 23.4 % — SIGNIFICANT CHANGE UP (ref 13–44)
MAGNESIUM SERPL-MCNC: 1.8 MG/DL — SIGNIFICANT CHANGE UP (ref 1.6–2.6)
MCHC RBC-ENTMCNC: 32.4 PG — SIGNIFICANT CHANGE UP (ref 27–34)
MCHC RBC-ENTMCNC: 33.4 GM/DL — SIGNIFICANT CHANGE UP (ref 32–36)
MCV RBC AUTO: 97.2 FL — SIGNIFICANT CHANGE UP (ref 80–100)
MONOCYTES # BLD AUTO: 0.5 K/UL — SIGNIFICANT CHANGE UP (ref 0–0.9)
MONOCYTES NFR BLD AUTO: 8.6 % — SIGNIFICANT CHANGE UP (ref 1–9)
NEUTROPHILS # BLD AUTO: 3.8 K/UL — SIGNIFICANT CHANGE UP (ref 1.8–7.4)
NEUTROPHILS NFR BLD AUTO: 64 % — SIGNIFICANT CHANGE UP (ref 43–77)
PLATELET # BLD AUTO: 108 K/UL — LOW (ref 150–400)
POTASSIUM SERPL-MCNC: 4.1 MMOL/L — SIGNIFICANT CHANGE UP (ref 3.5–5.3)
POTASSIUM SERPL-SCNC: 4.1 MMOL/L — SIGNIFICANT CHANGE UP (ref 3.5–5.3)
RBC # BLD: 2.9 M/UL — LOW (ref 4.2–5.8)
RBC # FLD: 13.3 % — SIGNIFICANT CHANGE UP (ref 10.3–14.5)
SODIUM SERPL-SCNC: 140 MMOL/L — SIGNIFICANT CHANGE UP (ref 135–145)
WBC # BLD: 5.9 K/UL — SIGNIFICANT CHANGE UP (ref 3.8–10.5)
WBC # FLD AUTO: 5.9 K/UL — SIGNIFICANT CHANGE UP (ref 3.8–10.5)

## 2018-02-13 PROCEDURE — 82962 GLUCOSE BLOOD TEST: CPT

## 2018-02-13 PROCEDURE — 83036 HEMOGLOBIN GLYCOSYLATED A1C: CPT

## 2018-02-13 PROCEDURE — 99261: CPT

## 2018-02-13 PROCEDURE — 83735 ASSAY OF MAGNESIUM: CPT

## 2018-02-13 PROCEDURE — 80053 COMPREHEN METABOLIC PANEL: CPT

## 2018-02-13 PROCEDURE — 94640 AIRWAY INHALATION TREATMENT: CPT

## 2018-02-13 PROCEDURE — 93005 ELECTROCARDIOGRAM TRACING: CPT

## 2018-02-13 PROCEDURE — 71045 X-RAY EXAM CHEST 1 VIEW: CPT

## 2018-02-13 PROCEDURE — 99239 HOSP IP/OBS DSCHRG MGMT >30: CPT

## 2018-02-13 PROCEDURE — 96374 THER/PROPH/DIAG INJ IV PUSH: CPT

## 2018-02-13 PROCEDURE — 96375 TX/PRO/DX INJ NEW DRUG ADDON: CPT

## 2018-02-13 PROCEDURE — 94760 N-INVAS EAR/PLS OXIMETRY 1: CPT

## 2018-02-13 PROCEDURE — 80048 BASIC METABOLIC PNL TOTAL CA: CPT

## 2018-02-13 PROCEDURE — 96372 THER/PROPH/DIAG INJ SC/IM: CPT

## 2018-02-13 PROCEDURE — 99285 EMERGENCY DEPT VISIT HI MDM: CPT | Mod: 25

## 2018-02-13 PROCEDURE — 85610 PROTHROMBIN TIME: CPT

## 2018-02-13 PROCEDURE — 99221 1ST HOSP IP/OBS SF/LOW 40: CPT

## 2018-02-13 PROCEDURE — 94660 CPAP INITIATION&MGMT: CPT

## 2018-02-13 PROCEDURE — 85027 COMPLETE CBC AUTOMATED: CPT

## 2018-02-13 RX ADMIN — Medication 650 MILLIGRAM(S): at 11:53

## 2018-02-13 RX ADMIN — Medication 1 PATCH: at 11:44

## 2018-02-13 RX ADMIN — HEPARIN SODIUM 5000 UNIT(S): 5000 INJECTION INTRAVENOUS; SUBCUTANEOUS at 05:04

## 2018-02-13 RX ADMIN — Medication 100 MILLIGRAM(S): at 17:42

## 2018-02-13 RX ADMIN — Medication 1 TABLET(S): at 11:44

## 2018-02-13 RX ADMIN — SERTRALINE 100 MILLIGRAM(S): 25 TABLET, FILM COATED ORAL at 11:45

## 2018-02-13 RX ADMIN — GABAPENTIN 100 MILLIGRAM(S): 400 CAPSULE ORAL at 05:05

## 2018-02-13 RX ADMIN — Medication 650 MILLIGRAM(S): at 12:54

## 2018-02-13 RX ADMIN — Medication 3 GRAM(S): at 11:45

## 2018-02-13 RX ADMIN — Medication 50 MILLIGRAM(S): at 05:05

## 2018-02-13 RX ADMIN — TIOTROPIUM BROMIDE 1 CAPSULE(S): 18 CAPSULE ORAL; RESPIRATORY (INHALATION) at 05:13

## 2018-02-13 RX ADMIN — GABAPENTIN 100 MILLIGRAM(S): 400 CAPSULE ORAL at 17:41

## 2018-02-13 RX ADMIN — Medication 100 MILLIGRAM(S): at 05:05

## 2018-02-13 RX ADMIN — PANTOPRAZOLE SODIUM 40 MILLIGRAM(S): 20 TABLET, DELAYED RELEASE ORAL at 05:04

## 2018-02-13 RX ADMIN — MAGNESIUM OXIDE 400 MG ORAL TABLET 400 MILLIGRAM(S): 241.3 TABLET ORAL at 11:45

## 2018-02-13 NOTE — PROGRESS NOTE ADULT - SUBJECTIVE AND OBJECTIVE BOX
Patient is a 72y old  Male who presents with a chief complaint of SOB (12 Feb 2018 08:16)      SUBJECTIVE / OVERNIGHT EVENTS: Patient feels much better now that he had HD yesterday and today. No sob, ANDRES, chest pain. Patient plans on resuming HD this Thursday. Wants his dialysis hours adjusted for logistical issues which is being coordinated by Dr Swanson. Denies any HI, SI, acute depression.    MEDICATIONS  (STANDING):  aspirin enteric coated 81 milliGRAM(s) Oral every other day  atorvastatin 40 milliGRAM(s) Oral at bedtime  Auryxia 210mg tablet 2 Tablet(s) 2 Tablet(s) Oral three times a day with meals  dextrose 5%. 1000 milliLiter(s) (50 mL/Hr) IV Continuous <Continuous>  dextrose 50% Injectable 12.5 Gram(s) IV Push once  dextrose 50% Injectable 25 Gram(s) IV Push once  dextrose 50% Injectable 25 Gram(s) IV Push once  docusate sodium 100 milliGRAM(s) Oral three times a day  gabapentin 100 milliGRAM(s) Oral three times a day  heparin  Injectable 5000 Unit(s) SubCutaneous every 8 hours  insulin lispro (HumaLOG) corrective regimen sliding scale   SubCutaneous three times a day before meals  insulin lispro (HumaLOG) corrective regimen sliding scale   SubCutaneous at bedtime  magnesium oxide 400 milliGRAM(s) Oral daily  metoprolol     tartrate 50 milliGRAM(s) Oral daily  Nephro-brooke 1 Tablet(s) Oral daily  nicotine - 21 mG/24Hr(s) Patch 1 patch Transdermal daily  omega-3-Acid Ethyl Esters 3 Gram(s) Oral daily  pantoprazole    Tablet 40 milliGRAM(s) Oral before breakfast  sertraline 100 milliGRAM(s) Oral daily  tiotropium 18 MICROgram(s) Capsule 1 Capsule(s) Inhalation daily    MEDICATIONS  (PRN):  acetaminophen   Tablet 650 milliGRAM(s) Oral every 6 hours PRN For Temp greater than 38 C (100.4 F)  acetaminophen   Tablet. 650 milliGRAM(s) Oral every 6 hours PRN Mild Pain (1 - 3)  ALBUTerol/ipratropium for Nebulization 3 milliLiter(s) Nebulizer every 6 hours PRN Shortness of Breath and/or Wheezing  dextrose Gel 1 Dose(s) Oral once PRN Blood Glucose LESS THAN 70 milliGRAM(s)/deciliter  glucagon  Injectable 1 milliGRAM(s) IntraMuscular once PRN Glucose LESS THAN 70 milligrams/deciliter  ondansetron Injectable 4 milliGRAM(s) IV Push every 6 hours PRN Nausea      Vital Signs Last 24 Hrs  T(C): 36.6 (13 Feb 2018 16:00), Max: 36.9 (12 Feb 2018 18:05)  T(F): 97.8 (13 Feb 2018 16:00), Max: 98.5 (12 Feb 2018 18:05)  HR: 62 (13 Feb 2018 16:00) (61 - 75)  BP: 150/69 (13 Feb 2018 16:00) (120/60 - 150/69)  BP(mean): --  RR: 18 (13 Feb 2018 16:00) (16 - 19)  SpO2: 94% (13 Feb 2018 16:00) (94% - 100%)  CAPILLARY BLOOD GLUCOSE      POCT Blood Glucose.: 143 mg/dL (13 Feb 2018 16:42)  POCT Blood Glucose.: 122 mg/dL (13 Feb 2018 11:47)  POCT Blood Glucose.: 102 mg/dL (13 Feb 2018 07:23)  POCT Blood Glucose.: 101 mg/dL (12 Feb 2018 21:36)    I&O's Summary    12 Feb 2018 07:01  -  13 Feb 2018 07:00  --------------------------------------------------------  IN: 0 mL / OUT: 3300 mL / NET: -3300 mL    13 Feb 2018 07:01  -  13 Feb 2018 17:19  --------------------------------------------------------  IN: 0 mL / OUT: 2000 mL / NET: -2000 mL        PHYSICAL EXAM:  GENERAL: NAD, well-developed  HEAD:  Atraumatic, Normocephalic  EYES: EOMI, PERRLA, conjunctiva and sclera clear  NECK: Supple, No JVD  CHEST/LUNG: Clear to auscultation bilaterally; No wheeze  HEART: Regular rate and rhythm; No murmurs, rubs, or gallops  ABDOMEN: Soft, Nontender, Nondistended; Bowel sounds present  EXTREMITIES:  2+ Peripheral Pulses, No clubbing, cyanosis, or edema  PSYCH: AAOx3  NEUROLOGY: non-focal  SKIN: No rashes or lesions    LABS:                        9.4    5.9   )-----------( 108      ( 13 Feb 2018 08:27 )             28.2     02-13    140  |  98  |  63<H>  ----------------------------<  93  4.1   |  29  |  9.60<H>    Ca    8.9      13 Feb 2018 08:27  Mg     1.8     02-13    TPro  8.2  /  Alb  3.7  /  TBili  0.7  /  DBili  x   /  AST  18  /  ALT  19  /  AlkPhos  107  02-12    PT/INR - ( 12 Feb 2018 06:44 )   PT: 12.7 sec;   INR: 1.16 ratio                       RADIOLOGY & ADDITIONAL TESTS:    Imaging Personally Reviewed:    Consultant(s) Notes Reviewed:      Care Discussed with Consultants/Other Providers: Patient is a 72y old  Male who presents with a chief complaint of SOB (12 Feb 2018 08:16)      SUBJECTIVE / OVERNIGHT EVENTS: Patient feels much better now that he had HD yesterday and today. No sob, ANDRES, chest pain. Patient plans on resuming HD this Thursday. Wants his dialysis hours adjusted for logistical issues which is being coordinated by Dr Swanson. Denies any HI, SI, acute depression.    MEDICATIONS  (STANDING):  aspirin enteric coated 81 milliGRAM(s) Oral every other day  atorvastatin 40 milliGRAM(s) Oral at bedtime  Auryxia 210mg tablet 2 Tablet(s) 2 Tablet(s) Oral three times a day with meals  dextrose 5%. 1000 milliLiter(s) (50 mL/Hr) IV Continuous <Continuous>  dextrose 50% Injectable 12.5 Gram(s) IV Push once  dextrose 50% Injectable 25 Gram(s) IV Push once  dextrose 50% Injectable 25 Gram(s) IV Push once  docusate sodium 100 milliGRAM(s) Oral three times a day  gabapentin 100 milliGRAM(s) Oral three times a day  heparin  Injectable 5000 Unit(s) SubCutaneous every 8 hours  insulin lispro (HumaLOG) corrective regimen sliding scale   SubCutaneous three times a day before meals  insulin lispro (HumaLOG) corrective regimen sliding scale   SubCutaneous at bedtime  magnesium oxide 400 milliGRAM(s) Oral daily  metoprolol     tartrate 50 milliGRAM(s) Oral daily  Nephro-brooke 1 Tablet(s) Oral daily  nicotine - 21 mG/24Hr(s) Patch 1 patch Transdermal daily  omega-3-Acid Ethyl Esters 3 Gram(s) Oral daily  pantoprazole    Tablet 40 milliGRAM(s) Oral before breakfast  sertraline 100 milliGRAM(s) Oral daily  tiotropium 18 MICROgram(s) Capsule 1 Capsule(s) Inhalation daily    MEDICATIONS  (PRN):  acetaminophen   Tablet 650 milliGRAM(s) Oral every 6 hours PRN For Temp greater than 38 C (100.4 F)  acetaminophen   Tablet. 650 milliGRAM(s) Oral every 6 hours PRN Mild Pain (1 - 3)  ALBUTerol/ipratropium for Nebulization 3 milliLiter(s) Nebulizer every 6 hours PRN Shortness of Breath and/or Wheezing  dextrose Gel 1 Dose(s) Oral once PRN Blood Glucose LESS THAN 70 milliGRAM(s)/deciliter  glucagon  Injectable 1 milliGRAM(s) IntraMuscular once PRN Glucose LESS THAN 70 milligrams/deciliter  ondansetron Injectable 4 milliGRAM(s) IV Push every 6 hours PRN Nausea      Vital Signs Last 24 Hrs  T(C): 36.6 (13 Feb 2018 16:00), Max: 36.9 (12 Feb 2018 18:05)  T(F): 97.8 (13 Feb 2018 16:00), Max: 98.5 (12 Feb 2018 18:05)  HR: 62 (13 Feb 2018 16:00) (61 - 75)  BP: 150/69 (13 Feb 2018 16:00) (120/60 - 150/69)  BP(mean): --  RR: 18 (13 Feb 2018 16:00) (16 - 19)  SpO2: 94% (13 Feb 2018 16:00) (94% - 100%)  CAPILLARY BLOOD GLUCOSE      POCT Blood Glucose.: 143 mg/dL (13 Feb 2018 16:42)  POCT Blood Glucose.: 122 mg/dL (13 Feb 2018 11:47)  POCT Blood Glucose.: 102 mg/dL (13 Feb 2018 07:23)  POCT Blood Glucose.: 101 mg/dL (12 Feb 2018 21:36)    I&O's Summary    12 Feb 2018 07:01  -  13 Feb 2018 07:00  --------------------------------------------------------  IN: 0 mL / OUT: 3300 mL / NET: -3300 mL    13 Feb 2018 07:01  -  13 Feb 2018 17:19  --------------------------------------------------------  IN: 0 mL / OUT: 2000 mL / NET: -2000 mL        PHYSICAL EXAM:  GENERAL: NAD, well-developed  HEAD:  Atraumatic, Normocephalic  NECK: Supple, No JVD  CHEST/LUNG: Clear to auscultation bilaterally; No wheeze  HEART: Regular rate and rhythm; No murmurs, rubs, or gallops  ABDOMEN: Soft, Nontender, Nondistended; Bowel sounds present  EXTREMITIES:  2+ Peripheral Pulses, No clubbing, cyanosis, or edema  PSYCH: AAOx3  NEUROLOGY: non-focal  SKIN: No rashes or lesions    LABS:                        9.4    5.9   )-----------( 108      ( 13 Feb 2018 08:27 )             28.2     02-13    140  |  98  |  63<H>  ----------------------------<  93  4.1   |  29  |  9.60<H>    Ca    8.9      13 Feb 2018 08:27  Mg     1.8     02-13    TPro  8.2  /  Alb  3.7  /  TBili  0.7  /  DBili  x   /  AST  18  /  ALT  19  /  AlkPhos  107  02-12    PT/INR - ( 12 Feb 2018 06:44 )   PT: 12.7 sec;   INR: 1.16 ratio                       RADIOLOGY & ADDITIONAL TESTS:    Imaging Personally Reviewed:    Consultant(s) Notes Reviewed:  nephro    Care Discussed with Consultants/Other Providers: Dr Swanson - renal see below Patient is a 72y old  Male who presents with a chief complaint of SOB (12 Feb 2018 08:16)      SUBJECTIVE / OVERNIGHT EVENTS: Patient feels much better now that he had HD yesterday and today. No sob, ANDRES, chest pain. Patient plans on resuming HD this Thursday. Wants his dialysis hours adjusted for logistical issues which is being coordinated by Dr Swanson. Denies any HI, SI, acute depression. Refusing to see psychiatry at inpatient, says he will followup with his outpatient psychiatry Dr Buck.    MEDICATIONS  (STANDING):  aspirin enteric coated 81 milliGRAM(s) Oral every other day  atorvastatin 40 milliGRAM(s) Oral at bedtime  Auryxia 210mg tablet 2 Tablet(s) 2 Tablet(s) Oral three times a day with meals  dextrose 5%. 1000 milliLiter(s) (50 mL/Hr) IV Continuous <Continuous>  dextrose 50% Injectable 12.5 Gram(s) IV Push once  dextrose 50% Injectable 25 Gram(s) IV Push once  dextrose 50% Injectable 25 Gram(s) IV Push once  docusate sodium 100 milliGRAM(s) Oral three times a day  gabapentin 100 milliGRAM(s) Oral three times a day  heparin  Injectable 5000 Unit(s) SubCutaneous every 8 hours  insulin lispro (HumaLOG) corrective regimen sliding scale   SubCutaneous three times a day before meals  insulin lispro (HumaLOG) corrective regimen sliding scale   SubCutaneous at bedtime  magnesium oxide 400 milliGRAM(s) Oral daily  metoprolol     tartrate 50 milliGRAM(s) Oral daily  Nephro-brooke 1 Tablet(s) Oral daily  nicotine - 21 mG/24Hr(s) Patch 1 patch Transdermal daily  omega-3-Acid Ethyl Esters 3 Gram(s) Oral daily  pantoprazole    Tablet 40 milliGRAM(s) Oral before breakfast  sertraline 100 milliGRAM(s) Oral daily  tiotropium 18 MICROgram(s) Capsule 1 Capsule(s) Inhalation daily    MEDICATIONS  (PRN):  acetaminophen   Tablet 650 milliGRAM(s) Oral every 6 hours PRN For Temp greater than 38 C (100.4 F)  acetaminophen   Tablet. 650 milliGRAM(s) Oral every 6 hours PRN Mild Pain (1 - 3)  ALBUTerol/ipratropium for Nebulization 3 milliLiter(s) Nebulizer every 6 hours PRN Shortness of Breath and/or Wheezing  dextrose Gel 1 Dose(s) Oral once PRN Blood Glucose LESS THAN 70 milliGRAM(s)/deciliter  glucagon  Injectable 1 milliGRAM(s) IntraMuscular once PRN Glucose LESS THAN 70 milligrams/deciliter  ondansetron Injectable 4 milliGRAM(s) IV Push every 6 hours PRN Nausea      Vital Signs Last 24 Hrs  T(C): 36.6 (13 Feb 2018 16:00), Max: 36.9 (12 Feb 2018 18:05)  T(F): 97.8 (13 Feb 2018 16:00), Max: 98.5 (12 Feb 2018 18:05)  HR: 62 (13 Feb 2018 16:00) (61 - 75)  BP: 150/69 (13 Feb 2018 16:00) (120/60 - 150/69)  BP(mean): --  RR: 18 (13 Feb 2018 16:00) (16 - 19)  SpO2: 94% (13 Feb 2018 16:00) (94% - 100%)  CAPILLARY BLOOD GLUCOSE      POCT Blood Glucose.: 143 mg/dL (13 Feb 2018 16:42)  POCT Blood Glucose.: 122 mg/dL (13 Feb 2018 11:47)  POCT Blood Glucose.: 102 mg/dL (13 Feb 2018 07:23)  POCT Blood Glucose.: 101 mg/dL (12 Feb 2018 21:36)    I&O's Summary    12 Feb 2018 07:01  -  13 Feb 2018 07:00  --------------------------------------------------------  IN: 0 mL / OUT: 3300 mL / NET: -3300 mL    13 Feb 2018 07:01  -  13 Feb 2018 17:19  --------------------------------------------------------  IN: 0 mL / OUT: 2000 mL / NET: -2000 mL        PHYSICAL EXAM:  GENERAL: NAD, well-developed  HEAD:  Atraumatic, Normocephalic  NECK: Supple, No JVD  CHEST/LUNG: Clear to auscultation bilaterally; No wheeze  HEART: Regular rate and rhythm; No murmurs, rubs, or gallops  ABDOMEN: Soft, Nontender, Nondistended; Bowel sounds present  EXTREMITIES:  2+ Peripheral Pulses, No clubbing, cyanosis, or edema  PSYCH: AAOx3  NEUROLOGY: non-focal  SKIN: No rashes or lesions    LABS:                        9.4    5.9   )-----------( 108      ( 13 Feb 2018 08:27 )             28.2     02-13    140  |  98  |  63<H>  ----------------------------<  93  4.1   |  29  |  9.60<H>    Ca    8.9      13 Feb 2018 08:27  Mg     1.8     02-13    TPro  8.2  /  Alb  3.7  /  TBili  0.7  /  DBili  x   /  AST  18  /  ALT  19  /  AlkPhos  107  02-12    PT/INR - ( 12 Feb 2018 06:44 )   PT: 12.7 sec;   INR: 1.16 ratio                       RADIOLOGY & ADDITIONAL TESTS:    Imaging Personally Reviewed:    Consultant(s) Notes Reviewed:  nephro    Care Discussed with Consultants/Other Providers: Dr Swanson - renal see below

## 2018-02-13 NOTE — DISCHARGE NOTE ADULT - PROVIDER TOKENS
TOKEN:'[id number]',FREE:[LAST:[egol],FIRST:[dr],PHONE:[(   )    -],FAX:[(   )    -],ADDRESS:[psychaitrist]]

## 2018-02-13 NOTE — PROGRESS NOTE ADULT - SUBJECTIVE AND OBJECTIVE BOX
SABINE JARA  72y  Male    Patient is a 72y old  Male who presents with a chief complaint of SOB (12 Feb 2018 08:16)      seen on dialysis, feels better today.   wants to go home.      PAST MEDICAL & SURGICAL HISTORY:  Hydrocephalus  Anemia  CAD (coronary artery disease)  DM (diabetes mellitus)  ESRD on peritoneal dialysis  COPD (chronic obstructive pulmonary disease)  S/P ventricular shunt placement  ESRD on dialysis: peritoneal dialysis catheter in place, left arm AV graft in place  S/P angioplasty with stent  S/P coronary artery bypass graft x 3          PHYSICAL EXAM:    T(C): 36.4 (02-13-18 @ 13:00), Max: 36.9 (02-12-18 @ 18:05)  HR: 61 (02-13-18 @ 13:00) (61 - 75)  BP: 126/55 (02-13-18 @ 13:00) (120/60 - 146/68)  RR: 19 (02-13-18 @ 13:00) (16 - 19)  SpO2: 94% (02-13-18 @ 13:00) (94% - 100%)  Wt(kg): --    I&O's Detail    12 Feb 2018 07:01  -  13 Feb 2018 07:00  --------------------------------------------------------  IN:  Total IN: 0 mL    OUT:    Other: 3300 mL  Total OUT: 3300 mL    Total NET: -3300 mL          Respiratory: clear anteriorly, decreased BS at bases  Cardiovascular: S1 S2  Gastrointestinal: soft NT ND +BS  Extremities: edema   Neuro: Awake and alert    MEDICATIONS  (STANDING):  aspirin enteric coated 81 milliGRAM(s) Oral every other day  atorvastatin 40 milliGRAM(s) Oral at bedtime  Auryxia 210mg tablet 2 Tablet(s) 2 Tablet(s) Oral three times a day with meals  dextrose 5%. 1000 milliLiter(s) (50 mL/Hr) IV Continuous <Continuous>  dextrose 50% Injectable 12.5 Gram(s) IV Push once  dextrose 50% Injectable 25 Gram(s) IV Push once  dextrose 50% Injectable 25 Gram(s) IV Push once  docusate sodium 100 milliGRAM(s) Oral three times a day  gabapentin 100 milliGRAM(s) Oral three times a day  heparin  Injectable 5000 Unit(s) SubCutaneous every 8 hours  insulin lispro (HumaLOG) corrective regimen sliding scale   SubCutaneous three times a day before meals  insulin lispro (HumaLOG) corrective regimen sliding scale   SubCutaneous at bedtime  magnesium oxide 400 milliGRAM(s) Oral daily  metoprolol     tartrate 50 milliGRAM(s) Oral daily  Nephro-brooke 1 Tablet(s) Oral daily  nicotine - 21 mG/24Hr(s) Patch 1 patch Transdermal daily  omega-3-Acid Ethyl Esters 3 Gram(s) Oral daily  pantoprazole    Tablet 40 milliGRAM(s) Oral before breakfast  sertraline 100 milliGRAM(s) Oral daily  tiotropium 18 MICROgram(s) Capsule 1 Capsule(s) Inhalation daily    MEDICATIONS  (PRN):  acetaminophen   Tablet 650 milliGRAM(s) Oral every 6 hours PRN For Temp greater than 38 C (100.4 F)  acetaminophen   Tablet. 650 milliGRAM(s) Oral every 6 hours PRN Mild Pain (1 - 3)  ALBUTerol/ipratropium for Nebulization 3 milliLiter(s) Nebulizer every 6 hours PRN Shortness of Breath and/or Wheezing  dextrose Gel 1 Dose(s) Oral once PRN Blood Glucose LESS THAN 70 milliGRAM(s)/deciliter  glucagon  Injectable 1 milliGRAM(s) IntraMuscular once PRN Glucose LESS THAN 70 milligrams/deciliter  ondansetron Injectable 4 milliGRAM(s) IV Push every 6 hours PRN Nausea                            9.4    5.9   )-----------( 108      ( 13 Feb 2018 08:27 )             28.2       02-13    140  |  98  |  63<H>  ----------------------------<  93  4.1   |  29  |  9.60<H>    Ca    8.9      13 Feb 2018 08:27  Mg     1.8     02-13    TPro  8.2  /  Alb  3.7  /  TBili  0.7  /  DBili  x   /  AST  18  /  ALT  19  /  AlkPhos  107  02-12      < from: Xray Chest 1 View AP/PA (02.12.18 @ 06:57) >  INTERPRETATION:  Chest portable semierect single AP view:    Clinical history:    Shortness of breath.    Findings:    Cardiomegaly. Median sternotomy. Evidence of CABG. Pulmonary venous   congestion. CP angles appear normal. No pneumothorax. Osteopenia.    Impression:    Findings suggestive of congestive heart failure with interstitial   pulmonary venous congestion. Clinical and lab correlation requested.

## 2018-02-13 NOTE — PROGRESS NOTE ADULT - PROBLEM SELECTOR PLAN 4
stable, without any SI, HI, and with plans to continue HD this Thursday as discussed with me and Dr Rinaldi bedside  c/w zoloft 100mg daily  refusing to see inpatient psychiatry, competent and able to make decision  has outpatient psychiatrist Dr CHI

## 2018-02-13 NOTE — DISCHARGE NOTE ADULT - HOSPITAL COURSE
per HPI:  "71y/o M. with hx of DM (diet controlled), Anemia, CAD, COPD, hydrocephalus, and ESRD on HD T-Th-Sat presenting with SOB since yesterday.  Pt. had missed his HD session this past Thursday and Saturday.  He reports that he has been on HD for about 7 years and is tired of it.  He1 was placed on BiPAP with improvement.  Pt. otherwise denies having fever, cough, CP, N/V, abdominal pain, diarrhea, or dysuria.  At this time produces minimal urine.  Rest of ROS negative.      Hospital Course: In ED noted to have potassium 6.4 s/p amp of dextrose, regular insulin 6 units, amp of sodium bicarbonate, and kayexalate.  He was initially sob and required BIPAP  Elevated BUN/Cr 133/15 CXR consistent with pulmonary vascular congestion. Renal evaluated patient and he got HD on 2/12 and 2/13. After HD his sob and henderson resolved, his 02 sat were stable on room air and his hyperkalemia normalized. He will continue with HD on T/Th/Sat, per renal no further inpatient workup per HPI:  "73y/o M. with hx of DM (diet controlled), Anemia, CAD, COPD, hydrocephalus, and ESRD on HD T-Th-Sat presenting with SOB since yesterday.  Pt. had missed his HD session this past Thursday and Saturday.  He reports that he has been on HD for about 7 years and is tired of it.  He1 was placed on BiPAP with improvement.  Pt. otherwise denies having fever, cough, CP, N/V, abdominal pain, diarrhea, or dysuria.  At this time produces minimal urine.  Rest of ROS negative.      Hospital Course: In ED noted to have potassium 6.4 with peaked T waves s/p amp of dextrose, regular insulin 6 units, amp of sodium bicarbonate, and Kayexalate  He was initially sob and required BIPAP  Elevated BUN/Cr 133/15 CXR consistent with pulmonary vascular congestion. Renal evaluated patient and he got HD on 2/12 and 2/13. After HD his sob and henderson resolved, his 02 sat were stable on room air and his hyperkalemia normalized. He will continue with HD on T/Th/Sat, per renal no further inpatient workup. Patient HD stable for discharge with outpatient renal, HD f/u and psych followup.

## 2018-02-13 NOTE — DISCHARGE NOTE ADULT - CARE PLAN
Principal Discharge DX:	Other hypervolemia  Goal:	continue with Hemodialysis on Tues, thurs, Sat  Assessment and plan of treatment:	watch diet, monitor fluid intake, limit to 1.5 L per day  keep renal diet  Secondary Diagnosis:	ESRD on dialysis  Assessment and plan of treatment:	continue with Hemodialysis on Tues, thurs, Sat  keep renal diet  Secondary Diagnosis:	Hyperkalemia  Assessment and plan of treatment:	continue with Hemodialysis on Tues, thurs, Sat  keep renal diet  Secondary Diagnosis:	CAD (coronary artery disease)  Assessment and plan of treatment:	continue home medications  Secondary Diagnosis:	Depression, unspecified depression type  Assessment and plan of treatment:	continue zoloft  see outpatient psychiatrist within 3-5 days

## 2018-02-13 NOTE — DISCHARGE NOTE ADULT - NSTOBACCOHOTLINE_GEN_A_CS
[facility] Smokers Quitline ([phone]) [facility] Smokers Quitline ([phone])

## 2018-02-13 NOTE — PROGRESS NOTE ADULT - ATTENDING COMMENTS
Can be discharged today with outpatient HD on T/Th/SAT  Outpatient psych followup with primary  discharge time 45 min Can be discharged today with outpatient HD on T/Th/SAT  Outpatient psych followup with primary psychiatrist [facility], [facility]  discharge time 45 min

## 2018-02-13 NOTE — PROGRESS NOTE ADULT - PROBLEM SELECTOR PLAN 1
resolved due to missing 2 HD sessions , now s/p 2 HD sessions today and tomorrow without any sob, henderson breathing comfortably on RA

## 2018-02-13 NOTE — PROGRESS NOTE ADULT - ASSESSMENT
72 white male with a history of HTN, CAD, CHF, DM. PVD, ESRD on HD now admitted for SOB  stable dialysis via AVF on the right.  tolerating fluid removal.   stable for discharge after dialysis today.   spoke to [facility].

## 2018-02-13 NOTE — PROGRESS NOTE ADULT - ASSESSMENT
71y/o M. with hx of type 2 DM (diet controlled), Anemia, CAD, COPD, hydrocephalus, and ESRD on HD T-Th-Sat presenting with SOB/fluid overload after missing 2 sessions of HD.    -SOB/Fluid overload/ESRD:  HD to be scheduled by Nephrology for fluid removal.    -Hyperkalemia: Pt. will receive amp of dextrose, regular insulin 6 units, amp of sodiium bicarbonate, and kayexalate.  Pt. to have hemodialysis today.   -Type 2 DM: will start low dose humalog sliding scale and check hbgA1c.  -Anemia:  stable.  Will continue to monitor.   -CAD: continue ASA, B-blocker, and statin.    -COPD:  Will continue maintenance inhalers and duoneb tx PRN.  -Nicotine addiction: nicotine 21mg patch daily  -VTE ppx: heparin 5000 units SQ Q8h    IMPROVE VTE Individual Risk Assessment          RISK                                                          Points  [  ] Previous VTE                                                3  [  ] Thrombophilia                                             2  [  ] Lower limb paralysis                                   2        (unable to hold up >15 seconds)    [  ] Current Cancer                                             2         (within 6 months)  [  ] Immobilization > 24 hrs                              1  [  ] ICU/CCU stay > 24 hours                             1  [ x ] Age > 60                                                         1    IMPROVE VTE Score: 1 73y/o M. with hx of type 2 DM (diet controlled), Anemia, CAD, COPD, hydrocephalus, and ESRD on HD T-Th-Sat presenting with SOB/fluid overload after missing 2 sessions of HD.    -SOB/Fluid overload/ESRD:  HD to be scheduled by Nephrology for fluid removal.    -Hyperkalemia: Pt. will receive amp of dextrose, regular insulin 6 units, amp of sodiium bicarbonate, and kayexalate.  Pt. to have hemodialysis today.   -Type 2 DM: will start low dose humalog sliding scale and check hbgA1c.  -Nicotine addiction: nicotine 21mg patch daily

## 2018-02-13 NOTE — DISCHARGE NOTE ADULT - PATIENT PORTAL LINK FT
You can access the [facility], offered by [facility], by registering with the following website: [email or web address]

## 2018-02-13 NOTE — PROGRESS NOTE ADULT - PROBLEM SELECTOR PLAN 3
c/w HD as previously scheduled on T/Th/SAT  -discussed with renal, Dr Swanson bedside who will rearrange HD time as per patient request for logistical issues

## 2018-02-13 NOTE — DISCHARGE NOTE ADULT - MEDICATION SUMMARY - MEDICATIONS TO TAKE
I will START or STAY ON the medications listed below when I get home from the hospital:    Tylenol 500 mg oral tablet  -- 2 tab(s) by mouth , As Needed  -- Indication: For Need for prophylactic measure    Aspirin Enteric Coated 81 mg oral delayed release tablet  -- 1 tab(s) by mouth every other day  -- Indication: For Coronary artery disease without angina pectoris, unspecified vessel or lesion type, unspecified whether native or transplanted heart    gabapentin 100 mg oral capsule  -- 1 cap(s) by mouth 3 times a day  -- Indication: For DM (diabetes mellitus)    Zoloft 100 mg oral tablet  -- 1 tab(s) by mouth once a day  -- Indication: For Depression, unspecified depression type    rosuvastatin 10 mg oral tablet  -- 1 tab(s) by mouth once a day (at bedtime)  -- Indication: For Coronary artery disease without angina pectoris, unspecified vessel or lesion type, unspecified whether native or transplanted heart    metoprolol tartrate 50 mg oral tablet  -- 1 tab(s) by mouth once a day  -- Indication: For Coronary artery disease without angina pectoris, unspecified vessel or lesion type, unspecified whether native or transplanted heart    tiotropium 18 mcg inhalation capsule  -- 1 cap(s) inhaled once a day  -- Indication: For Chronic obstructive pulmonary disease, unspecified COPD type    albuterol CFC free 90 mcg/inh inhalation aerosol  -- 2 puff(s) inhaled every 4 hours, As Needed  -- Indication: For Chronic obstructive pulmonary disease, unspecified COPD type    albuterol 2.5 mg/3 mL (0.083%) inhalation solution  -- 3 milliliter(s) inhaled every 6 hours, As Needed  -- Indication: For Chronic obstructive pulmonary disease, unspecified COPD type    Colace 100 mg oral capsule  -- 1 cap(s) by mouth 3 times a day  -- Indication: For Need for prophylactic measure    magnesium oxide 400 mg (241.3 mg elemental magnesium) oral tablet  -- 1 tab(s) by mouth once a day  -- Indication: For Other hypervolemia    Fish Oil  -- 3000 milligram(s) by mouth once a day  -- Indication: For Coronary artery disease without angina pectoris, unspecified vessel or lesion type, unspecified whether native or transplanted heart    Auryxia 210 mg oral tablet  -- 1 tab(s) by mouth once a day  -- Indication: For End-stage renal disease    omeprazole 20 mg oral delayed release tablet  -- 2 tab(s) by mouth once a day  -- Indication: For Need for prophylactic measure    nicotine 21 mg/24 hr transdermal film, extended release  -- 21 milligram(s) by transdermal patch once a day  -- Indication: For Need for prophylactic measure    nicotine 14 mg/24 hr transdermal film, extended release  -- 1 patch by transdermal patch once a day  -- Indication: For Need for prophylactic measure    nicotine 7 mg/24 hr transdermal film, extended release  -- 1 patch by transdermal patch once a day  -- Indication: For Need for prophylactic measure    nicotine 21 mg/24 hr transdermal film, extended release  -- 1 patch by transdermal patch once a day  -- Indication: For Need for prophylactic measure    Lindsey-Sarita oral tablet  -- 1 tab(s) by mouth once a day  -- Indication: For End-stage renal disease I will START or STAY ON the medications listed below when I get home from the hospital:    Tylenol 500 mg oral tablet  -- 2 tab(s) by mouth , As Needed  -- Indication: For Need for prophylactic measure    Aspirin Enteric Coated 81 mg oral delayed release tablet  -- 1 tab(s) by mouth every other day  -- Indication: For CAD (coronary artery disease)    gabapentin 100 mg oral capsule  -- 1 cap(s) by mouth 3 times a day  -- Indication: For DM (diabetes mellitus)    Zoloft 100 mg oral tablet  -- 1 tab(s) by mouth once a day  -- Indication: For Depression, unspecified depression type    rosuvastatin 10 mg oral tablet  -- 1 tab(s) by mouth once a day (at bedtime)  -- Indication: For CAD (coronary artery disease)    metoprolol tartrate 50 mg oral tablet  -- 1 tab(s) by mouth once a day  -- Indication: For CAD (coronary artery disease)    tiotropium 18 mcg inhalation capsule  -- 1 cap(s) inhaled once a day  -- Indication: For Chronic obstructive pulmonary disease, unspecified COPD type    albuterol CFC free 90 mcg/inh inhalation aerosol  -- 2 puff(s) inhaled every 4 hours, As Needed  -- Indication: For Chronic obstructive pulmonary disease, unspecified COPD type    albuterol 2.5 mg/3 mL (0.083%) inhalation solution  -- 3 milliliter(s) inhaled every 6 hours, As Needed  -- Indication: For Chronic obstructive pulmonary disease, unspecified COPD type    Colace 100 mg oral capsule  -- 1 cap(s) by mouth 3 times a day  -- Indication: For Need for prophylactic measure    magnesium oxide 400 mg (241.3 mg elemental magnesium) oral tablet  -- 1 tab(s) by mouth once a day  -- Indication: For Need for prophylactic measure    Fish Oil  -- 3000 milligram(s) by mouth once a day  -- Indication: For CAD (coronary artery disease)    Auryxia 210 mg oral tablet  -- 1 tab(s) by mouth once a day  -- Indication: For End-stage renal disease    omeprazole 20 mg oral delayed release tablet  -- 2 tab(s) by mouth once a day  -- Indication: For Need for prophylactic measure    nicotine 21 mg/24 hr transdermal film, extended release  -- 21 milligram(s) by transdermal patch once a day  -- Indication: For Need for prophylactic measure    nicotine 14 mg/24 hr transdermal film, extended release  -- 1 patch by transdermal patch once a day  -- Indication: For Need for prophylactic measure    nicotine 7 mg/24 hr transdermal film, extended release  -- 1 patch by transdermal patch once a day  -- Indication: For Need for prophylactic measure    nicotine 21 mg/24 hr transdermal film, extended release  -- 1 patch by transdermal patch once a day  -- Indication: For Need for prophylactic measure    Lindsey-Sarita oral tablet  -- 1 tab(s) by mouth once a day  -- Indication: For End-stage renal disease

## 2018-02-13 NOTE — DISCHARGE NOTE ADULT - HAS THE PATIENT RECEIVED THE INFLUENZA VACCINE DURING THIS VISIT
Pt arrived to [facility] ambulatory in no acute distress at 1105 for Abraxane C1D15  .      Assessment unremarkable except pt c/o of abdominal pain and fatigue. R chest port accessed without issue and positive blood return noted.      Spoke with Brenda Rosales NP and made her aware of patient's lab work. Order given to hold chemotherapy. Order given to Sienna Diez PharmD to give Hydration  ml Bolus once. Venancio Joseph came to [facility] to see pt and chemotherapy will be resumed in 2 weeks starting with C1D29. Pt appt made and pt and family aware. Patient Vitals for the past 12 hrs:   Temp Pulse Resp BP SpO2   09/06/17 1354 - 60 - 136/66 -   09/06/17 1119 98.2 °F (36.8 °C) 66 20 124/70 100 %         Labs obtained,   Recent Results (from the past 12 hour(s))   CBC WITH AUTOMATED DIFF    Collection Time: 09/06/17 11:54 AM   Result Value Ref Range    WBC 2.0 (L) 3.6 - 11.0 K/uL    RBC 2.94 (L) 3.80 - 5.20 M/uL    HGB 7.6 (L) 11.5 - 16.0 g/dL    HCT 21.5 (L) 35.0 - 47.0 %    MCV 73.1 (L) 80.0 - 99.0 FL    MCH 25.9 (L) 26.0 - 34.0 PG    MCHC 35.3 30.0 - 36.5 g/dL    RDW 19.7 (H) 11.5 - 14.5 %    PLATELET 476 523 - 787 K/uL    NEUTROPHILS 58 %    LYMPHOCYTES 38 %    MONOCYTES 4 %    EOSINOPHILS 0 %    BASOPHILS 0 %    TOTAL CELLS COUNTED: 50      ABS. NEUTROPHILS 1.1 K/UL    ABS. LYMPHOCYTES 0.8 K/UL    ABS. MONOCYTES 0.1 K/UL    ABS. EOSINOPHILS 0.0 K/UL    ABS.  BASOPHILS 0.0 K/UL    RBC COMMENTS ANISOCYTOSIS  1+        RBC COMMENTS MICROCYTOSIS  PRESENT        DF MANUAL     METABOLIC PANEL, BASIC    Collection Time: 09/06/17 11:54 AM   Result Value Ref Range    Sodium 129 (L) 136 - 145 mmol/L    Potassium 4.7 3.5 - 5.1 mmol/L    Chloride 103 97 - 108 mmol/L    CO2 18 (L) 21 - 32 mmol/L    Anion gap 8 5 - 15 mmol/L    Glucose 107 (H) 65 - 100 mg/dL    BUN 21 (H) 6 - 20 MG/DL    Creatinine 1.26 (H) 0.55 - 1.02 MG/DL    BUN/Creatinine ratio 17 12 - 20      GFR est AA 50 (L) >60 ml/min/1.73m2    GFR est non-AA 41 (L) >60 ml/min/1.73m2    Calcium 7.7 (L) 8.5 - 10.1 MG/DL       The following medications administered:   ml Bolus IV  NS Flush  Heparin Flush    Pt tolerated treatment well.  IV flushed per policy and removed, 2x2 and paper tape placed.  Pt discharged ambulatory in no acute distress at 1400, accompanied by family.   Next appointment 9/20/17 No

## 2018-02-13 NOTE — DISCHARGE NOTE ADULT - CARE PROVIDER_API CALL
Luis oCnde), Medicine  [address] [place] [address]  Phone: [phone]  Fax: [phone]    dr dejuan  [facility]  Phone: (   )    -  Fax: (   )    -

## 2018-02-16 ENCOUNTER — APPOINTMENT (OUTPATIENT)
Dept: NEPHROLOGY | Facility: CLINIC | Age: 73
End: 2018-02-16
Payer: COMMERCIAL

## 2018-02-16 VITALS
RESPIRATION RATE: 14 BRPM | HEART RATE: 64 BPM | SYSTOLIC BLOOD PRESSURE: 137 MMHG | OXYGEN SATURATION: 96 % | WEIGHT: 177 LBS | TEMPERATURE: 98.6 F | HEIGHT: 68 IN | DIASTOLIC BLOOD PRESSURE: 59 MMHG | BODY MASS INDEX: 26.83 KG/M2

## 2018-02-16 DIAGNOSIS — E11.9 TYPE 2 DIABETES MELLITUS W/OUT COMPLICATIONS: ICD-10-CM

## 2018-02-16 DIAGNOSIS — Z01.818 ENCOUNTER FOR OTHER PREPROCEDURAL EXAMINATION: ICD-10-CM

## 2018-02-16 DIAGNOSIS — I25.10 ATHEROSCLEROTIC HEART DISEASE OF NATIVE CORONARY ARTERY W/OUT ANGINA PECTORIS: ICD-10-CM

## 2018-02-16 DIAGNOSIS — N18.6 END STAGE RENAL DISEASE: ICD-10-CM

## 2018-02-16 PROCEDURE — 99214 OFFICE O/P EST MOD 30 MIN: CPT

## 2018-02-27 ENCOUNTER — INPATIENT (INPATIENT)
Facility: HOSPITAL | Age: 73
LOS: 5 days | Discharge: HOME | DRG: 85 | End: 2018-03-05
Attending: NEUROLOGICAL SURGERY | Admitting: NEUROLOGICAL SURGERY
Payer: MEDICARE

## 2018-02-27 VITALS
RESPIRATION RATE: 17 BRPM | HEIGHT: 68 IN | WEIGHT: 175.05 LBS | SYSTOLIC BLOOD PRESSURE: 160 MMHG | HEART RATE: 74 BPM | DIASTOLIC BLOOD PRESSURE: 69 MMHG | TEMPERATURE: 99 F | OXYGEN SATURATION: 97 %

## 2018-02-27 DIAGNOSIS — Z98.2 PRESENCE OF CEREBROSPINAL FLUID DRAINAGE DEVICE: Chronic | ICD-10-CM

## 2018-02-27 LAB
ALBUMIN SERPL ELPH-MCNC: 4.1 G/DL — SIGNIFICANT CHANGE UP (ref 3.3–5)
ALP SERPL-CCNC: 88 U/L — SIGNIFICANT CHANGE UP (ref 40–120)
ALT FLD-CCNC: 8 U/L RC — LOW (ref 10–45)
ANION GAP SERPL CALC-SCNC: 16 MMOL/L — SIGNIFICANT CHANGE UP (ref 5–17)
APTT BLD: 30.1 SEC — SIGNIFICANT CHANGE UP (ref 27.5–37.4)
AST SERPL-CCNC: 19 U/L — SIGNIFICANT CHANGE UP (ref 10–40)
BASOPHILS # BLD AUTO: 0 K/UL — SIGNIFICANT CHANGE UP (ref 0–0.2)
BASOPHILS NFR BLD AUTO: 0.6 % — SIGNIFICANT CHANGE UP (ref 0–2)
BILIRUB SERPL-MCNC: 0.3 MG/DL — SIGNIFICANT CHANGE UP (ref 0.2–1.2)
BUN SERPL-MCNC: 28 MG/DL — HIGH (ref 7–23)
CALCIUM SERPL-MCNC: 9.4 MG/DL — SIGNIFICANT CHANGE UP (ref 8.4–10.5)
CHLORIDE SERPL-SCNC: 92 MMOL/L — LOW (ref 96–108)
CK MB CFR SERPL CALC: 2.8 NG/ML — SIGNIFICANT CHANGE UP (ref 0–6.7)
CK SERPL-CCNC: 76 U/L — SIGNIFICANT CHANGE UP (ref 30–200)
CO2 SERPL-SCNC: 27 MMOL/L — SIGNIFICANT CHANGE UP (ref 22–31)
CREAT SERPL-MCNC: 5.68 MG/DL — HIGH (ref 0.5–1.3)
EOSINOPHIL # BLD AUTO: 0.1 K/UL — SIGNIFICANT CHANGE UP (ref 0–0.5)
EOSINOPHIL NFR BLD AUTO: 1.6 % — SIGNIFICANT CHANGE UP (ref 0–6)
GLUCOSE SERPL-MCNC: 99 MG/DL — SIGNIFICANT CHANGE UP (ref 70–99)
HCT VFR BLD CALC: 30.5 % — LOW (ref 39–50)
HGB BLD-MCNC: 10.7 G/DL — LOW (ref 13–17)
INR BLD: 1.1 RATIO — SIGNIFICANT CHANGE UP (ref 0.88–1.16)
LYMPHOCYTES # BLD AUTO: 1 K/UL — SIGNIFICANT CHANGE UP (ref 1–3.3)
LYMPHOCYTES # BLD AUTO: 17.4 % — SIGNIFICANT CHANGE UP (ref 13–44)
MCHC RBC-ENTMCNC: 34.3 PG — HIGH (ref 27–34)
MCHC RBC-ENTMCNC: 35.1 GM/DL — SIGNIFICANT CHANGE UP (ref 32–36)
MCV RBC AUTO: 97.7 FL — SIGNIFICANT CHANGE UP (ref 80–100)
MONOCYTES # BLD AUTO: 0.5 K/UL — SIGNIFICANT CHANGE UP (ref 0–0.9)
MONOCYTES NFR BLD AUTO: 8.4 % — SIGNIFICANT CHANGE UP (ref 2–14)
NEUTROPHILS # BLD AUTO: 4.1 K/UL — SIGNIFICANT CHANGE UP (ref 1.8–7.4)
NEUTROPHILS NFR BLD AUTO: 72.1 % — SIGNIFICANT CHANGE UP (ref 43–77)
PLATELET # BLD AUTO: 163 K/UL — SIGNIFICANT CHANGE UP (ref 150–400)
POTASSIUM SERPL-MCNC: 3.6 MMOL/L — SIGNIFICANT CHANGE UP (ref 3.5–5.3)
POTASSIUM SERPL-SCNC: 3.6 MMOL/L — SIGNIFICANT CHANGE UP (ref 3.5–5.3)
PROT SERPL-MCNC: 8.3 G/DL — SIGNIFICANT CHANGE UP (ref 6–8.3)
PROTHROM AB SERPL-ACNC: 11.9 SEC — SIGNIFICANT CHANGE UP (ref 9.8–12.7)
RBC # BLD: 3.12 M/UL — LOW (ref 4.2–5.8)
RBC # FLD: 13.4 % — SIGNIFICANT CHANGE UP (ref 10.3–14.5)
SODIUM SERPL-SCNC: 135 MMOL/L — SIGNIFICANT CHANGE UP (ref 135–145)
TROPONIN T SERPL-MCNC: 0.06 NG/ML — SIGNIFICANT CHANGE UP (ref 0–0.06)
WBC # BLD: 5.7 K/UL — SIGNIFICANT CHANGE UP (ref 3.8–10.5)
WBC # FLD AUTO: 5.7 K/UL — SIGNIFICANT CHANGE UP (ref 3.8–10.5)

## 2018-02-27 PROCEDURE — 99285 EMERGENCY DEPT VISIT HI MDM: CPT | Mod: GC

## 2018-02-27 NOTE — ED PROVIDER NOTE - PROGRESS NOTE DETAILS
Attending MD Franklin.  Case discussed with [facility]. Pt has a subacute subdural but is clinically well appearing.  Pt is managed by [facility].  Dr. Amaya is pt's neurosurgeon.  Plan to call Dr. Amaya re: shunt management, may need to turn shunt up per NSG consult.  Will discuss with Dr. Amaya and reconsult [facility] as needed or requested by Dr. Amaya. Attending MD Franklin.  Case discussed with [facility]. Pt has a subacute subdural but is clinically well appearing.  Pt is managed by [facility].  Dr. Amaya is pt's neurosurgeon.  Plan to call Dr. Amaya re: shunt management, may need to turn shunt up per NSG consult.  Will discuss with Dr. Amaya and reconsult [facility] as needed or requested by Dr. Amaya.  Pt's private neurosurgeon called and pending response to discuss plan of care at time of signout to incoming team.  Stable at time of signout to incoming team.

## 2018-02-27 NOTE — ED PROVIDER NOTE - NOTES
Discussed outside imaging results and physical exam with neurosurgery. Discussed outside imaging results and physical exam with [facility] neurosurgery.

## 2018-02-27 NOTE — ED PROVIDER NOTE - CARDIAC, MLM
Normal rate, regular rhythm.  Heart sounds S1, S2.  No murmurs, rubs or gallops. Right arm fistula with palpable thrill

## 2018-02-27 NOTE — ED PROVIDER NOTE - OBJECTIVE STATEMENT
71y/o M. with hx of DM (diet controlled), CAD s/p CABG (on aspirin 81), COPD, hydrocephalus s/p shunt 2 years ago, and ESRD on HD T-Th-Sat (last HD this morning) presenting for evaluation of bilateral subdural hematomas found on imaging. 73y/o M. with hx of DM (diet controlled), CAD s/p CABG (on aspirin 81), COPD, hydrocephalus s/p shunt 2 years ago, and ESRD on HD T-Th-Sat (last HD this morning) presenting for evaluation of bilateral subdural hematomas found on outside imaging this morning. Reports that he was in his usual state of health after discharge, but suffered fall 1 week prior and once again this morning, hit the back of his head both times. Saw his PMD this morning, ordered CT head (brought disc and report) which showed:    "...a subdural hematoma adjacent to the right hemisphere with subacute and acute blood products within it, measuring 9 mm at the widest point with a significant mass effect with sulcal effacement and shift across the midline measuring 4 mm; a smaller subacute-chronic subdural hematoma was also seen adjacent to the left hemisphere measuring 6 mm at the widest point, no evidence of acute blood products on that collection; neither hematoma was present on the prior study from Nov 16 2016. It also showed there is a shunt catheter placed through a left frontal cortney hole; also noted to have compression of the ventricles 2/2 the hematoma and a significant decrease in the size of the ventricles compared to the previous study, raising the possibility of over shunting."    Currently, the patient reports bilateral hand numbness which he reports he typically gets after HD. Also endorses residual left-sided upper extremity relative weakness x2 years which is unchanged. GSC score now is 15.

## 2018-02-27 NOTE — ED PROVIDER NOTE - ATTENDING CONTRIBUTION TO CARE
Attending MD Franklin.  Agree with above.  PT is a 72 yr old male with hx of DMII diet controlled, CAD s/p CABG on ASA 81 mg, COPD, hydrocephalus s/p shunt 2 yrs ago, ESRD on HD T/Th/Sat (last dialyzed this mornign without complication) who presents to ED after he was seen as outpt this afternoon by physician and had non-emergetn head CT s/p mechanical fall 1 wk ago with persistent headache.  Pt found to have bilateral SDH's with 4 mm midline shift.  SEnt to ED for NSG eval.  Remains neurologically afocal.  Headache is mild.  Pt is well appearing.  ASA 81 mg only, no other AC.  Pt denies CP/syncope/SOB prior to, during or following fall.  States that he stood up in the middle of the night next to bed and 'knees gave out' causing him to fall.  Denies any light-headedness during this event.

## 2018-02-28 DIAGNOSIS — I62.00 NONTRAUMATIC SUBDURAL HEMORRHAGE, UNSPECIFIED: ICD-10-CM

## 2018-02-28 LAB
BLD GP AB SCN SERPL QL: NEGATIVE — SIGNIFICANT CHANGE UP
RH IG SCN BLD-IMP: POSITIVE — SIGNIFICANT CHANGE UP

## 2018-02-28 PROCEDURE — 99233 SBSQ HOSP IP/OBS HIGH 50: CPT

## 2018-02-28 PROCEDURE — 70450 CT HEAD/BRAIN W/O DYE: CPT | Mod: 26,77

## 2018-02-28 PROCEDURE — 75809 NONVASCULAR SHUNT X-RAY: CPT | Mod: 26

## 2018-02-28 PROCEDURE — 62252 CSF SHUNT REPROGRAM: CPT | Mod: 26

## 2018-02-28 PROCEDURE — 70450 CT HEAD/BRAIN W/O DYE: CPT | Mod: 26

## 2018-02-28 PROCEDURE — 99222 1ST HOSP IP/OBS MODERATE 55: CPT

## 2018-02-28 PROCEDURE — 71045 X-RAY EXAM CHEST 1 VIEW: CPT | Mod: 26

## 2018-02-28 RX ORDER — DEXAMETHASONE 0.5 MG/5ML
4 ELIXIR ORAL EVERY 6 HOURS
Qty: 0 | Refills: 0 | Status: DISCONTINUED | OUTPATIENT
Start: 2018-02-28 | End: 2018-03-03

## 2018-02-28 RX ORDER — SENNA PLUS 8.6 MG/1
2 TABLET ORAL AT BEDTIME
Qty: 0 | Refills: 0 | Status: DISCONTINUED | OUTPATIENT
Start: 2018-02-28 | End: 2018-03-05

## 2018-02-28 RX ORDER — LEVETIRACETAM 250 MG/1
500 TABLET, FILM COATED ORAL EVERY 12 HOURS
Qty: 0 | Refills: 0 | Status: DISCONTINUED | OUTPATIENT
Start: 2018-02-28 | End: 2018-03-05

## 2018-02-28 RX ORDER — NICOTINE POLACRILEX 2 MG
1 GUM BUCCAL DAILY
Qty: 0 | Refills: 0 | Status: DISCONTINUED | OUTPATIENT
Start: 2018-02-28 | End: 2018-03-05

## 2018-02-28 RX ORDER — METOPROLOL TARTRATE 50 MG
50 TABLET ORAL
Qty: 0 | Refills: 0 | Status: DISCONTINUED | OUTPATIENT
Start: 2018-02-28 | End: 2018-03-02

## 2018-02-28 RX ORDER — GABAPENTIN 400 MG/1
100 CAPSULE ORAL THREE TIMES A DAY
Qty: 0 | Refills: 0 | Status: DISCONTINUED | OUTPATIENT
Start: 2018-02-28 | End: 2018-03-05

## 2018-02-28 RX ORDER — ATORVASTATIN CALCIUM 80 MG/1
40 TABLET, FILM COATED ORAL AT BEDTIME
Qty: 0 | Refills: 0 | Status: DISCONTINUED | OUTPATIENT
Start: 2018-02-28 | End: 2018-03-05

## 2018-02-28 RX ORDER — ACETAMINOPHEN 500 MG
650 TABLET ORAL EVERY 6 HOURS
Qty: 0 | Refills: 0 | Status: DISCONTINUED | OUTPATIENT
Start: 2018-02-28 | End: 2018-03-05

## 2018-02-28 RX ORDER — PANTOPRAZOLE SODIUM 20 MG/1
40 TABLET, DELAYED RELEASE ORAL
Qty: 0 | Refills: 0 | Status: DISCONTINUED | OUTPATIENT
Start: 2018-02-28 | End: 2018-02-28

## 2018-02-28 RX ORDER — OMEGA-3 ACID ETHYL ESTERS 1 G
4 CAPSULE ORAL DAILY
Qty: 0 | Refills: 0 | Status: DISCONTINUED | OUTPATIENT
Start: 2018-02-28 | End: 2018-03-05

## 2018-02-28 RX ORDER — MAGNESIUM OXIDE 400 MG ORAL TABLET 241.3 MG
400 TABLET ORAL DAILY
Qty: 0 | Refills: 0 | Status: DISCONTINUED | OUTPATIENT
Start: 2018-02-28 | End: 2018-03-02

## 2018-02-28 RX ORDER — METOPROLOL TARTRATE 50 MG
50 TABLET ORAL DAILY
Qty: 0 | Refills: 0 | Status: DISCONTINUED | OUTPATIENT
Start: 2018-02-28 | End: 2018-02-28

## 2018-02-28 RX ORDER — TIOTROPIUM BROMIDE 18 UG/1
1 CAPSULE ORAL; RESPIRATORY (INHALATION) DAILY
Qty: 0 | Refills: 0 | Status: DISCONTINUED | OUTPATIENT
Start: 2018-02-28 | End: 2018-03-05

## 2018-02-28 RX ORDER — SERTRALINE 25 MG/1
100 TABLET, FILM COATED ORAL DAILY
Qty: 0 | Refills: 0 | Status: DISCONTINUED | OUTPATIENT
Start: 2018-02-28 | End: 2018-03-05

## 2018-02-28 RX ORDER — ONDANSETRON 8 MG/1
4 TABLET, FILM COATED ORAL EVERY 6 HOURS
Qty: 0 | Refills: 0 | Status: DISCONTINUED | OUTPATIENT
Start: 2018-02-28 | End: 2018-03-05

## 2018-02-28 RX ORDER — PANTOPRAZOLE SODIUM 20 MG/1
40 TABLET, DELAYED RELEASE ORAL
Qty: 0 | Refills: 0 | Status: DISCONTINUED | OUTPATIENT
Start: 2018-02-28 | End: 2018-03-05

## 2018-02-28 RX ORDER — ALBUTEROL 90 UG/1
2 AEROSOL, METERED ORAL EVERY 4 HOURS
Qty: 0 | Refills: 0 | Status: DISCONTINUED | OUTPATIENT
Start: 2018-02-28 | End: 2018-03-05

## 2018-02-28 RX ORDER — DOCUSATE SODIUM 100 MG
100 CAPSULE ORAL THREE TIMES A DAY
Qty: 0 | Refills: 0 | Status: DISCONTINUED | OUTPATIENT
Start: 2018-02-28 | End: 2018-03-05

## 2018-02-28 RX ADMIN — Medication 4 GRAM(S): at 17:52

## 2018-02-28 RX ADMIN — Medication 100 MILLIGRAM(S): at 05:12

## 2018-02-28 RX ADMIN — TIOTROPIUM BROMIDE 1 CAPSULE(S): 18 CAPSULE ORAL; RESPIRATORY (INHALATION) at 21:32

## 2018-02-28 RX ADMIN — Medication 100 MILLIGRAM(S): at 12:34

## 2018-02-28 RX ADMIN — GABAPENTIN 100 MILLIGRAM(S): 400 CAPSULE ORAL at 21:29

## 2018-02-28 RX ADMIN — Medication 650 MILLIGRAM(S): at 04:20

## 2018-02-28 RX ADMIN — ATORVASTATIN CALCIUM 40 MILLIGRAM(S): 80 TABLET, FILM COATED ORAL at 21:29

## 2018-02-28 RX ADMIN — PANTOPRAZOLE SODIUM 40 MILLIGRAM(S): 20 TABLET, DELAYED RELEASE ORAL at 17:54

## 2018-02-28 RX ADMIN — MAGNESIUM OXIDE 400 MG ORAL TABLET 400 MILLIGRAM(S): 241.3 TABLET ORAL at 17:52

## 2018-02-28 RX ADMIN — Medication 1 TABLET(S): at 12:34

## 2018-02-28 RX ADMIN — LEVETIRACETAM 500 MILLIGRAM(S): 250 TABLET, FILM COATED ORAL at 17:52

## 2018-02-28 RX ADMIN — GABAPENTIN 100 MILLIGRAM(S): 400 CAPSULE ORAL at 05:12

## 2018-02-28 RX ADMIN — Medication 650 MILLIGRAM(S): at 13:05

## 2018-02-28 RX ADMIN — GABAPENTIN 100 MILLIGRAM(S): 400 CAPSULE ORAL at 12:34

## 2018-02-28 RX ADMIN — PANTOPRAZOLE SODIUM 40 MILLIGRAM(S): 20 TABLET, DELAYED RELEASE ORAL at 05:12

## 2018-02-28 RX ADMIN — Medication 4 MILLIGRAM(S): at 17:52

## 2018-02-28 RX ADMIN — Medication 50 MILLIGRAM(S): at 17:52

## 2018-02-28 RX ADMIN — Medication 50 MILLIGRAM(S): at 04:23

## 2018-02-28 RX ADMIN — Medication 650 MILLIGRAM(S): at 12:35

## 2018-02-28 RX ADMIN — SERTRALINE 100 MILLIGRAM(S): 25 TABLET, FILM COATED ORAL at 17:53

## 2018-02-28 RX ADMIN — Medication 1 PATCH: at 05:48

## 2018-02-28 RX ADMIN — LEVETIRACETAM 500 MILLIGRAM(S): 250 TABLET, FILM COATED ORAL at 05:12

## 2018-02-28 RX ADMIN — Medication 100 MILLIGRAM(S): at 21:29

## 2018-02-28 NOTE — ED ADULT NURSE REASSESSMENT NOTE - NS ED NURSE REASSESS COMMENT FT1
Pt Bp 180 systolic,. Neurosurg paged. as per MD Moreno FOSTER Give 50 Metoprolol now. Pt stable reports slight headache. Tylenol 650 po given. Report given. Awaiting pt transport.

## 2018-02-28 NOTE — H&P ADULT - HISTORY OF PRESENT ILLNESS
72M with VPS placed by Dr. Amaya ([facility]) 2 years ago for NPH, has had multiple falls, 2 weeks ago, and then today. Since the fall he has reported headache. No weakness/paresthesias/N/V or seizures. CTH performed by PCP for the headache demonstrated a R subacute SDH with~4mm MLS, and he was told to come to the emergency department. He takes baby asa at home, last dose yesterday.

## 2018-02-28 NOTE — H&P ADULT - ATTENDING COMMENTS
As per above resident note.  Seen and examined with resident and agree with above evaluation and assessment.  Pt underwent VPS 2 years ago at [facility] with Strata Valve.  He states he only had gait difficulty which did not improve after shunting and he has been lately off balance and falling.  He is getting hemodialysis, and heparin was stopped about 9 months ago, according to him.  He has been having headaches after a fall last week.  Pt is alert, awake in NAD, VARGAS with no drift.  Shunt depresses and refills readily.  Gait is mildly broad based with short, alternating steps, veering a bit to the right and mild to moderate unsteadiness with one person assist.  He has about 2 steps on the turn.  CT showed bilateral chronic subdural chronic collections right more than left with approx 3 mm right to left midline shift.  There is acute extraaxial blood within the right subdural collection. Left  shunt in place.  PT has VPS with likely chronic overdrainage and the shunt was raised from 2 to 2.5.  We will start dexamethasone 4 mg q6h for now to see if this improves his headaches.  Discussed with pt the possible future need for surgery to drain SDH and/or tying off the shunt temporarily, with possible valve change to another type (eg Certas).  Will monitor closely.

## 2018-02-28 NOTE — H&P ADULT - ASSESSMENT
72M s/p multiple falls and VPS found to have R subacute SDH. [facility] was called, discussed the patient and the complication of SDH with a VPS. The covering practitioner stated they were unable to care for the patient and the complication at [facility], as such he was admitted to the Neurosurgical service here. The VPS was changed from 2.0 to 2.5 in the ED, and repeat CTH appeared grossly stable from his outpatient scan.     - CTH in AM  - Hold Asa  - Keppra 500 BID   - q. 4 neuro checks.

## 2018-02-28 NOTE — PROGRESS NOTE ADULT - SUBJECTIVE AND OBJECTIVE BOX
SUBJECTIVE: Patient seen and examined at bedside. Complains of moderate headaches, unchanged from yesterday. Denies nausea/vomiting, vision changes. He states he has had multiple falls over the last few weeks with +head trauma. Shunt reprogrammed to 2.5 upon admission.    Vital Signs Last 24 Hrs  T(C): 38.3 (18 @ 08:41), Max: 38.3 (18 @ 08:41)  T(F): 100.9 (18 @ 08:41), Max: 100.9 (18 @ 08:41)  HR: 54 (18 @ 08:41) (54 - 80)  BP: 143/73 (18 @ 08:41) (143/73 - 180/76)  RR: 18 (18 @ 08:41) (17 - 18)  SpO2: 96% (18 @ 08:41) (93% - 98%)    PHYSICAL EXAM:    Constitutional: No Acute Distress, resting comfortably in bed    Neurological: Awake, alert, oriented to person, place and year, speech clear, briskly following commands, Moving all Extremities with 5/5 strength, no drift; sensation intact to light touch, pupils 3mm and briskly reactive, extraocular movements intact    Pulmonary:  No rales, No rhonchi, No wheezes - decreased breath sounds at bilateral bases    Cardiovascular: S1, S2, Regular rate and rhythm     Gastrointestinal: Soft, Non-tender, Non-distended     Extremities: No calf tenderness bilaterally        LABS:                          10.7   5.7   )-----------( 163      ( 2018 22:13 )             30.5        135  |  92<L>  |  28<H>  ----------------------------<  99  3.6   |  27  |  5.68<H>    Ca    9.4      2018 22:13    TPro  8.3  /  Alb  4.1  /  TBili  0.3  /  DBili  x   /  AST  19  /  ALT  8<L>  /  AlkPhos  88    PT/INR - ( 2018 22:13 )   PT: 11.9 sec;   INR: 1.10 ratio         PTT - ( 2018 22:13 )  PTT:30.1 sec      IMAGIN/28 CT head no contrast: No change from 12:38 AM. Right frontal parietal mixed density subdural collection with a small amount of acute hemorrhage unchanged. Small low density left frontal parietal collection without significant   mass effect.    MEDICATIONS:  Antibiotics:    Neuro:  acetaminophen   Tablet 650 milliGRAM(s) Oral every 6 hours PRN For Temp greater than 38 C (100.4 F)  acetaminophen   Tablet. 650 milliGRAM(s) Oral every 6 hours PRN Mild Pain (1 - 3)  gabapentin 100 milliGRAM(s) Oral three times a day  levETIRAcetam 500 milliGRAM(s) Oral every 12 hours  ondansetron   Disintegrating Tablet 4 milliGRAM(s) Oral every 6 hours PRN Nausea  sertraline 100 milliGRAM(s) Oral daily    Cardiac:  metoprolol     tartrate 50 milliGRAM(s) Oral two times a day    Pulm:  ALBUTerol    90 MICROgram(s) HFA Inhaler 2 Puff(s) Inhalation every 4 hours PRN Bronchospasm  tiotropium 18 MICROgram(s) Capsule 1 Capsule(s) Inhalation daily    GI/:  docusate sodium 100 milliGRAM(s) Oral three times a day  pantoprazole    Tablet 40 milliGRAM(s) Oral before breakfast  senna 2 Tablet(s) Oral at bedtime PRN Constipation    Other:   atorvastatin 40 milliGRAM(s) Oral at bedtime  dexamethasone     Tablet 4 milliGRAM(s) Oral every 6 hours  magnesium oxide 400 milliGRAM(s) Oral daily  multivitamin 1 Tablet(s) Oral daily  nicotine - 21 mG/24Hr(s) Patch 1 patch Transdermal daily  omega-3-Acid Ethyl Esters 4 Gram(s) Oral daily        DIET: CCD Renal

## 2018-02-28 NOTE — PROGRESS NOTE ADULT - ASSESSMENT
HPI:  72M with VPS placed by Dr. Amaya ([facility]) 2 years ago for NPH, has had multiple falls, 2 weeks ago, and then today. Since the fall he has reported headache. No weakness/paresthesias/N/V or seizures. CTH performed by PCP for the headache demonstrated a R subacute SDH with~4mm MLS, and he was told to come to the emergency department. He takes baby asa at home, last dose yesterday. (28 Feb 2018 01:27)    PLAN:  -Shunt reprogrammed to 2.5  -Will monitor patient clinically for now  -Check  shunt series  -Continue Keppra for seizure prophylaxis  -Start decadron 4q6 for headaches  -Start Protonix for GI prophylaxis while on steroids  -Continue Neurontin for hx of neuropathy  -Continue Albuterol nebs PRN for hx of COPD  -Hx of ESRD; gets HD T/Th/Sat; last HD yesterday. Electrolytes WNL today. Called patient's nephrologist Dr. Ayala at [phone] whose team has privileges here; spoke with his partner Dr. Erickson and they will see him in the morning and plan on HD tomorrow - NO HEPARIN.  -CXR pending for +non-productive cough  -Encouraged incentive spirometer  -Encouraged mobilization  -DVT ppx: bilateral venodynes; off chemoprophylaxis for now due to subdural hematoma  -Dispo: PT eval pending  -Discussed patient/plan with Dr. Renae. Patient's previous neurosurgeon Dr. Amaya states that if the patient requires neurosurgical intervention, he has no objection to him being treated here.       [facility] #[id number]

## 2018-03-01 DIAGNOSIS — I25.10 ATHEROSCLEROTIC HEART DISEASE OF NATIVE CORONARY ARTERY WITHOUT ANGINA PECTORIS: ICD-10-CM

## 2018-03-01 DIAGNOSIS — Z29.9 ENCOUNTER FOR PROPHYLACTIC MEASURES, UNSPECIFIED: ICD-10-CM

## 2018-03-01 DIAGNOSIS — E11.9 TYPE 2 DIABETES MELLITUS WITHOUT COMPLICATIONS: ICD-10-CM

## 2018-03-01 DIAGNOSIS — G91.9 HYDROCEPHALUS, UNSPECIFIED: ICD-10-CM

## 2018-03-01 DIAGNOSIS — J44.9 CHRONIC OBSTRUCTIVE PULMONARY DISEASE, UNSPECIFIED: ICD-10-CM

## 2018-03-01 DIAGNOSIS — N18.6 END STAGE RENAL DISEASE: ICD-10-CM

## 2018-03-01 DIAGNOSIS — G93.5 COMPRESSION OF BRAIN: ICD-10-CM

## 2018-03-01 LAB
ANION GAP SERPL CALC-SCNC: 21 MMOL/L — HIGH (ref 5–17)
BUN SERPL-MCNC: 54 MG/DL — HIGH (ref 7–23)
CALCIUM SERPL-MCNC: 9.8 MG/DL — SIGNIFICANT CHANGE UP (ref 8.4–10.5)
CHLORIDE SERPL-SCNC: 88 MMOL/L — LOW (ref 96–108)
CO2 SERPL-SCNC: 21 MMOL/L — LOW (ref 22–31)
CREAT SERPL-MCNC: 8.59 MG/DL — HIGH (ref 0.5–1.3)
GLUCOSE BLDC GLUCOMTR-MCNC: 122 MG/DL — HIGH (ref 70–99)
GLUCOSE BLDC GLUCOMTR-MCNC: 146 MG/DL — HIGH (ref 70–99)
GLUCOSE BLDC GLUCOMTR-MCNC: 258 MG/DL — HIGH (ref 70–99)
GLUCOSE SERPL-MCNC: 193 MG/DL — HIGH (ref 70–99)
HCT VFR BLD CALC: 29.3 % — LOW (ref 39–50)
HGB BLD-MCNC: 9.7 G/DL — LOW (ref 13–17)
MAGNESIUM SERPL-MCNC: 1.9 MG/DL — SIGNIFICANT CHANGE UP (ref 1.6–2.6)
MCHC RBC-ENTMCNC: 32.4 PG — SIGNIFICANT CHANGE UP (ref 27–34)
MCHC RBC-ENTMCNC: 33.1 GM/DL — SIGNIFICANT CHANGE UP (ref 32–36)
MCV RBC AUTO: 97.9 FL — SIGNIFICANT CHANGE UP (ref 80–100)
PLATELET # BLD AUTO: 157 K/UL — SIGNIFICANT CHANGE UP (ref 150–400)
POTASSIUM SERPL-MCNC: 5.1 MMOL/L — SIGNIFICANT CHANGE UP (ref 3.5–5.3)
POTASSIUM SERPL-SCNC: 5.1 MMOL/L — SIGNIFICANT CHANGE UP (ref 3.5–5.3)
RBC # BLD: 3 M/UL — LOW (ref 4.2–5.8)
RBC # FLD: 13.4 % — SIGNIFICANT CHANGE UP (ref 10.3–14.5)
SODIUM SERPL-SCNC: 130 MMOL/L — LOW (ref 135–145)
WBC # BLD: 5.5 K/UL — SIGNIFICANT CHANGE UP (ref 3.8–10.5)
WBC # FLD AUTO: 5.5 K/UL — SIGNIFICANT CHANGE UP (ref 3.8–10.5)

## 2018-03-01 PROCEDURE — 99223 1ST HOSP IP/OBS HIGH 75: CPT

## 2018-03-01 PROCEDURE — 99233 SBSQ HOSP IP/OBS HIGH 50: CPT

## 2018-03-01 RX ORDER — INSULIN LISPRO 100/ML
VIAL (ML) SUBCUTANEOUS
Qty: 0 | Refills: 0 | Status: DISCONTINUED | OUTPATIENT
Start: 2018-03-01 | End: 2018-03-05

## 2018-03-01 RX ORDER — SEVELAMER CARBONATE 2400 MG/1
400 POWDER, FOR SUSPENSION ORAL
Qty: 0 | Refills: 0 | Status: DISCONTINUED | OUTPATIENT
Start: 2018-03-01 | End: 2018-03-01

## 2018-03-01 RX ADMIN — Medication 4 MILLIGRAM(S): at 05:09

## 2018-03-01 RX ADMIN — LEVETIRACETAM 500 MILLIGRAM(S): 250 TABLET, FILM COATED ORAL at 18:22

## 2018-03-01 RX ADMIN — Medication 4 MILLIGRAM(S): at 18:22

## 2018-03-01 RX ADMIN — PANTOPRAZOLE SODIUM 40 MILLIGRAM(S): 20 TABLET, DELAYED RELEASE ORAL at 12:47

## 2018-03-01 RX ADMIN — SERTRALINE 100 MILLIGRAM(S): 25 TABLET, FILM COATED ORAL at 12:47

## 2018-03-01 RX ADMIN — Medication 50 MILLIGRAM(S): at 18:22

## 2018-03-01 RX ADMIN — Medication 1 TABLET(S): at 21:54

## 2018-03-01 RX ADMIN — LEVETIRACETAM 500 MILLIGRAM(S): 250 TABLET, FILM COATED ORAL at 05:09

## 2018-03-01 RX ADMIN — ATORVASTATIN CALCIUM 40 MILLIGRAM(S): 80 TABLET, FILM COATED ORAL at 21:46

## 2018-03-01 RX ADMIN — TIOTROPIUM BROMIDE 1 CAPSULE(S): 18 CAPSULE ORAL; RESPIRATORY (INHALATION) at 21:32

## 2018-03-01 RX ADMIN — Medication 4 GRAM(S): at 12:47

## 2018-03-01 RX ADMIN — MAGNESIUM OXIDE 400 MG ORAL TABLET 400 MILLIGRAM(S): 241.3 TABLET ORAL at 12:47

## 2018-03-01 RX ADMIN — Medication 650 MILLIGRAM(S): at 18:21

## 2018-03-01 RX ADMIN — Medication 3: at 21:49

## 2018-03-01 RX ADMIN — Medication 4 MILLIGRAM(S): at 23:50

## 2018-03-01 RX ADMIN — Medication 4 MILLIGRAM(S): at 00:30

## 2018-03-01 RX ADMIN — Medication 100 MILLIGRAM(S): at 21:46

## 2018-03-01 RX ADMIN — Medication 1 TABLET(S): at 12:47

## 2018-03-01 RX ADMIN — Medication 1 PATCH: at 12:48

## 2018-03-01 RX ADMIN — Medication 100 MILLIGRAM(S): at 05:09

## 2018-03-01 RX ADMIN — Medication 100 MILLIGRAM(S): at 12:48

## 2018-03-01 RX ADMIN — GABAPENTIN 100 MILLIGRAM(S): 400 CAPSULE ORAL at 05:09

## 2018-03-01 RX ADMIN — GABAPENTIN 100 MILLIGRAM(S): 400 CAPSULE ORAL at 21:46

## 2018-03-01 RX ADMIN — Medication 4 MILLIGRAM(S): at 12:47

## 2018-03-01 RX ADMIN — Medication 50 MILLIGRAM(S): at 05:09

## 2018-03-01 RX ADMIN — GABAPENTIN 100 MILLIGRAM(S): 400 CAPSULE ORAL at 12:49

## 2018-03-01 NOTE — CONSULT NOTE ADULT - SUBJECTIVE AND OBJECTIVE BOX
Pt is 72M with VPS placed by Dr. Amaya ([facility]) 2 years ago for NPH, hx of multiple falls, 2 weeks ago, and then on 2/28. Since the fall he has reported headache. No weakness/paresthesias/N/V or seizures. CTH performed by PCP for the headache demonstrated R SDH. Pt told to go to ER. He has hx of ESRD on HD TTS, last HD 2/27, stable tx. Due for HD today. Patient is awake, alert, denies CP, SOB, N/V, D/C, F/C, reports HA is less. S/p VPS.     Review of Systems:  Review of Systems: as above.	      Allergies and Intolerances:   	No Known Allergies:     Home Medications:   * Patient Currently Takes Medications as of 13-Feb-2018 17:42 documented in Structured Notes  · 	nicotine 21 mg/24 hr transdermal film, extended release: 21 milligram(s) transdermal once a day  · 	Zoloft 100 mg oral tablet: 1 tab(s) orally once a day  · 	albuterol CFC free 90 mcg/inh inhalation aerosol: 2 puff(s) inhaled every 4 hours, As Needed  · 	metoprolol tartrate 50 mg oral tablet: 1 tab(s) orally once a day  · 	Auryxia 210 mg oral tablet: 1 tab(s) orally once a day  · 	Aspirin Enteric Coated 81 mg oral delayed release tablet: 1 tab(s) orally every other day  · 	Fish Oil: 3000 milligram(s) orally once a day  · 	rosuvastatin 10 mg oral tablet: 1 tab(s) orally once a day (at bedtime)  · 	gabapentin 100 mg oral capsule: 1 cap(s) orally 3 times a day  · 	tiotropium 18 mcg inhalation capsule: 1 cap(s) inhaled once a day  · 	nicotine 14 mg/24 hr transdermal film, extended release: 1 patch transdermal once a day  · 	nicotine 7 mg/24 hr transdermal film, extended release: 1 patch transdermal once a day  · 	nicotine 21 mg/24 hr transdermal film, extended release: 1 patch transdermal once a day  · 	albuterol 2.5 mg/3 mL (0.083%) inhalation solution: 3 milliliter(s) inhaled every 6 hours, As Needed  · 	Tylenol 500 mg oral tablet: 2 tab(s) orally , As Needed  · 	Lindsey-Sarita oral tablet: 1 tab(s) orally once a day  · 	magnesium oxide 400 mg (241.3 mg elemental magnesium) oral tablet: 1 tab(s) orally once a day  · 	Colace 100 mg oral capsule: 1 cap(s) orally 3 times a day  · 	omeprazole 20 mg oral delayed release tablet: 2 tab(s) orally once a day    .   Past Medical History:  Anemia    CAD (coronary artery disease)    COPD (chronic obstructive pulmonary disease)    DM (diabetes mellitus)    ESRD on HD  Hydrocephalus.     Past Surgical History:  ESRD on dialysis  left arm AVF  S/P angioplasty with stent    S/P coronary artery bypass graft x 3    S/P ventricular shunt placement.     Family History:  No pertinent family history in first degree relatives.     Tobacco Screening:  negative     Physical Exam:    Gen - no distress  Card - S1S2  Lungs - good air entry b/l  Abd - soft, NT, ND, + BS  Extr - tr edema, AVF + bruit    acetaminophen   Tablet 650 milliGRAM(s) Oral every 6 hours PRN  acetaminophen   Tablet. 650 milliGRAM(s) Oral every 6 hours PRN  ALBUTerol    90 MICROgram(s) HFA Inhaler 2 Puff(s) Inhalation every 4 hours PRN  atorvastatin 40 milliGRAM(s) Oral at bedtime  dexamethasone     Tablet 4 milliGRAM(s) Oral every 6 hours  docusate sodium 100 milliGRAM(s) Oral three times a day  gabapentin 100 milliGRAM(s) Oral three times a day  insulin lispro (HumaLOG) corrective regimen sliding scale   SubCutaneous Before meals and at bedtime  levETIRAcetam 500 milliGRAM(s) Oral every 12 hours  magnesium oxide 400 milliGRAM(s) Oral daily  metoprolol     tartrate 50 milliGRAM(s) Oral two times a day  multivitamin 1 Tablet(s) Oral daily  nicotine - 21 mG/24Hr(s) Patch 1 patch Transdermal daily  omega-3-Acid Ethyl Esters 4 Gram(s) Oral daily  ondansetron   Disintegrating Tablet 4 milliGRAM(s) Oral every 6 hours PRN  pantoprazole    Tablet 40 milliGRAM(s) Oral before breakfast  senna 2 Tablet(s) Oral at bedtime PRN  sertraline 100 milliGRAM(s) Oral daily  tiotropium 18 MICROgram(s) Capsule 1 Capsule(s) Inhalation daily                          9.7    5.5   )-----------( 157      ( 01 Mar 2018 07:18 )             29.3     01 Mar 2018 07:17    130    |  88     |  54     ----------------------------<  193    5.1     |  21     |  8.59     Ca    9.8        01 Mar 2018 07:17  Mg     1.9       01 Mar 2018 07:18    TPro  8.3    /  Alb  4.1    /  TBili  0.3    /  DBili  x      /  AST  19     /  ALT  8      /  AlkPhos  88     27 Feb 2018 22:13    LIVER FUNCTIONS - ( 27 Feb 2018 22:13 )  Alb: 4.1 g/dL / Pro: 8.3 g/dL / ALK PHOS: 88 U/L / ALT: 8 U/L RC / AST: 19 U/L / GGT: x           PT/INR - ( 27 Feb 2018 22:13 )   PT: 11.9 sec;   INR: 1.10 ratio        Assessment and Plan:     Pt is 72M w/MMP as above, hx of falls and VPS found to have R subacute SDH. The VPS was changed from 2.0 to 2.5 in the ED.     ESRD on HD  HD today  No Heparin w/HD  TMP 2 kg  Renal diet  Will continue HD TTS

## 2018-03-01 NOTE — CHART NOTE - NSCHARTNOTEFT_GEN_A_CORE
Shunt series suggests Strata II valve at 2.0 though magnet reading on site says 2.5. Using Strata Valve , shunt valve programmed to 2.5 - valve setting confirmed @ 2.5.

## 2018-03-01 NOTE — CONSULT NOTE ADULT - ASSESSMENT
72M with VPS placed by Dr. Amaya ([facility]) 2 years ago for NPH, has had multiple falls, 2 weeks ago, and then 2/28 found to have R subacute SDH with~4mm MLS.

## 2018-03-01 NOTE — PROGRESS NOTE ADULT - SUBJECTIVE AND OBJECTIVE BOX
SUBJECTIVE: Patient seen and examined at bedside. Patient states headaches seem to be improved today. Denies nausea/vomiting, vision changes.     Vital Signs Last 24 Hrs  T(C): 36.8 (18 @ 12:40), Max: 37.3 (18 @ 04:38)  T(F): 98.2 (18 @ 12:40), Max: 99.1 (18 @ 04:38)  HR: 61 (18 @ 12:40) (60 - 81)  BP: 161/71 (18 @ 12:40) (132/68 - 176/73)  RR: 18 (18 @ 12:40) (18 - 18)  SpO2: 97% (18 @ 12:40) (93% - 98%)    PHYSICAL EXAM:    Constitutional: No Acute Distress, resting comfortably in bed    Neurological: Awake, alert, oriented to person, place and year, speech clear, briskly following commands, Moving all Extremities with 5/5 strength, no drift; sensation intact to light touch, pupils 3mm and briskly reactive, extraocular movements intact    Pulmonary:  No rales, No rhonchi, No wheezes - decreased breath sounds at bilateral bases    Cardiovascular: S1, S2, Regular rate and rhythm     Gastrointestinal: Soft, Non-tender, Non-distended     Extremities: No calf tenderness bilaterally        LABS:                          9.7    5.5   )-----------( 157      ( 01 Mar 2018 07:18 )             29.3   03-01    130<L>  |  88<L>  |  54<H>  ----------------------------<  193<H>  5.1   |  21<L>  |  8.59<H>    Ca    9.8      01 Mar 2018 07:17  Mg     1.9     -    TPro  8.3  /  Alb  4.1  /  TBili  0.3  /  DBili  x   /  AST  19  /  ALT  8<L>  /  AlkPhos  88          IMAGIN/28 CT head no contrast: No change from 12:38 AM. Right frontal parietal mixed density subdural collection with a small amount of acute hemorrhage unchanged. Small low density left frontal parietal collection without significant   mass effect.    MEDICATIONS:  Antibiotics:    Neuro:  acetaminophen   Tablet 650 milliGRAM(s) Oral every 6 hours PRN For Temp greater than 38 C (100.4 F)  acetaminophen   Tablet. 650 milliGRAM(s) Oral every 6 hours PRN Mild Pain (1 - 3)  gabapentin 100 milliGRAM(s) Oral three times a day  levETIRAcetam 500 milliGRAM(s) Oral every 12 hours  ondansetron   Disintegrating Tablet 4 milliGRAM(s) Oral every 6 hours PRN Nausea  sertraline 100 milliGRAM(s) Oral daily    Cardiac:  metoprolol     tartrate 50 milliGRAM(s) Oral two times a day    Pulm:  ALBUTerol    90 MICROgram(s) HFA Inhaler 2 Puff(s) Inhalation every 4 hours PRN Bronchospasm  tiotropium 18 MICROgram(s) Capsule 1 Capsule(s) Inhalation daily    GI/:  docusate sodium 100 milliGRAM(s) Oral three times a day  pantoprazole    Tablet 40 milliGRAM(s) Oral before breakfast  senna 2 Tablet(s) Oral at bedtime PRN Constipation    Other:   atorvastatin 40 milliGRAM(s) Oral at bedtime  dexamethasone     Tablet 4 milliGRAM(s) Oral every 6 hours  magnesium oxide 400 milliGRAM(s) Oral daily  multivitamin 1 Tablet(s) Oral daily  nicotine - 21 mG/24Hr(s) Patch 1 patch Transdermal daily  omega-3-Acid Ethyl Esters 4 Gram(s) Oral daily        DIET: CCD Renal

## 2018-03-01 NOTE — PHYSICAL THERAPY INITIAL EVALUATION ADULT - ADDITIONAL COMMENTS
Lives with his brother in a private home, +6 steps to enter, +6 steps inside with HR. Prior to admission, pt ambulated with a SC indoors and used a RW for community ambulation. Pt reports multiple recent falls stating his legs, "just give out on him."

## 2018-03-01 NOTE — PROGRESS NOTE ADULT - ASSESSMENT
HPI:  72M with VPS placed by Dr. Amaya ([facility]) 2 years ago for NPH, has had multiple falls, 2 weeks ago, and then today. Since the fall he has reported headache. No weakness/paresthesias/N/V or seizures. CTH performed by PCP for the headache demonstrated a R subacute SDH with~4mm MLS, and he was told to come to the emergency department. He takes baby asa at home, last dose yesterday. (28 Feb 2018 01:27)    PLAN:  -Shunt reprogrammed to 2.5 on 2/28  - shunt series without evidence of discontinuity or kinking  -Continue Keppra for seizure prophylaxis  -Continue decadron 4q6 for headaches  -Continue Protonix for GI prophylaxis while on steroids  -Continue Neurontin for hx of neuropathy  -Continue Albuterol nebs PRN for hx of COPD  -Continue HD T/Th/Sat for hx of ESRD; will be dialyzed today; NO HEPARIN with dialysis  -Encouraged incentive spirometer  -Encouraged mobilization  -DVT ppx: bilateral venodynes; off chemoprophylaxis for now due to subdural hematoma  -Dispo: PT-LUIZ  -Discussed with Dr. Oc Bedoya #[id number]

## 2018-03-01 NOTE — CONSULT NOTE ADULT - PROBLEM SELECTOR RECOMMENDATION 6
TTS  check phos   patient likely on phosphate binders at home thought calcium 9.8   likely start renagel tid if phosphate high

## 2018-03-01 NOTE — PHYSICAL THERAPY INITIAL EVALUATION ADULT - PRECAUTIONS/LIMITATIONS, REHAB EVAL
fall precautions/CTH: Mixed density right holohemispheric subdural hematoma measuring up to 1.0 cm in greatest with and exerting leftward mass effect resulting in 4 mm of shift. Additionally there is an isodense left frontoparietal subdural hematoma measuring up to 6 mm in greatest depth without significant mass effect. These are unchanged as compared to report from patient's outside head CT.

## 2018-03-01 NOTE — PHYSICAL THERAPY INITIAL EVALUATION ADULT - PERTINENT HX OF CURRENT PROBLEM, REHAB EVAL
73 yo M with VPS placed by Dr. Amaya ([facility]) 2 years ago for NPH, has had multiple falls, 2 weeks ago, and then today. CTH performed by PCP for the headache demonstrated a R subacute SDH with~4mm MLS. Shunt reprogrammed to 2.5 upon admission. Pt receives HD T/Th/Sat.

## 2018-03-01 NOTE — CONSULT NOTE ADULT - SUBJECTIVE AND OBJECTIVE BOX
cc: headache    HPI:  72M with VPS placed by Dr. Amaya ([facility] [[email or web address] 2 years ago for NPH, has had multiple falls, 2 weeks ago, and then today. Since the fall he has reported headache. No weakness/paresthesias/N/V or seizures. CTH performed by PCP for the headache demonstrated a R subacute SDH with~4mm MLS, and he was told to come to the emergency department. He takes baby asa at home, last dose yesterday.      PAST MEDICAL & SURGICAL HISTORY:  Hydrocephalus  Anemia  CAD (coronary artery disease)  DM (diabetes mellitus)  ESRD on peritoneal dialysis  COPD (chronic obstructive pulmonary disease)  S/P ventricular shunt placement  ESRD on dialysis: peritoneal dialysis catheter in place, left arm AV graft in place  S/P angioplasty with stent  S/P coronary artery bypass graft x 3      Review of Systems:   CONSTITUTIONAL: No fever  EYES: No eye pain, visual disturbances  ENMT:  No difficulty hearing,   NECK: No pain or stiffness  RESPIRATORY: No cough, No shortness of breath  CARDIOVASCULAR: No chest pain, palpitations,  GASTROINTESTINAL: No abdominal or epigastric pain. No nausea, vomiting,  GENITOURINARY: No dysuria,   NEUROLOGICAL: No headaches,   SKIN: No rashes  ENDOCRINE: No heat or cold intolerance  MUSCULOSKELETAL: No joint pain or swelling;   PSYCHIATRIC: No depression,   ALLERY AND IMMUNOLOGIC: No hives or eczema    Allergies    No Known Allergies    Intolerances        Social History:     FAMILY HISTORY:  No pertinent family history in first degree relatives      MEDICATIONS  (STANDING):  atorvastatin 40 milliGRAM(s) Oral at bedtime  dexamethasone     Tablet 4 milliGRAM(s) Oral every 6 hours  docusate sodium 100 milliGRAM(s) Oral three times a day  gabapentin 100 milliGRAM(s) Oral three times a day  insulin lispro (HumaLOG) corrective regimen sliding scale   SubCutaneous Before meals and at bedtime  levETIRAcetam 500 milliGRAM(s) Oral every 12 hours  magnesium oxide 400 milliGRAM(s) Oral daily  metoprolol     tartrate 50 milliGRAM(s) Oral two times a day  multivitamin 1 Tablet(s) Oral daily  nicotine - 21 mG/24Hr(s) Patch 1 patch Transdermal daily  omega-3-Acid Ethyl Esters 4 Gram(s) Oral daily  pantoprazole    Tablet 40 milliGRAM(s) Oral before breakfast  sertraline 100 milliGRAM(s) Oral daily  tiotropium 18 MICROgram(s) Capsule 1 Capsule(s) Inhalation daily    MEDICATIONS  (PRN):  acetaminophen   Tablet 650 milliGRAM(s) Oral every 6 hours PRN For Temp greater than 38 C (100.4 F)  acetaminophen   Tablet. 650 milliGRAM(s) Oral every 6 hours PRN Mild Pain (1 - 3)  ALBUTerol    90 MICROgram(s) HFA Inhaler 2 Puff(s) Inhalation every 4 hours PRN Bronchospasm  ondansetron   Disintegrating Tablet 4 milliGRAM(s) Oral every 6 hours PRN Nausea  senna 2 Tablet(s) Oral at bedtime PRN Constipation      Vital Signs Last 24 Hrs  T(C): 36.8 (01 Mar 2018 12:40), Max: 37.3 (01 Mar 2018 04:38)  T(F): 98.2 (01 Mar 2018 12:40), Max: 99.1 (01 Mar 2018 04:38)  HR: 61 (01 Mar 2018 12:40) (60 - 81)  BP: 161/71 (01 Mar 2018 12:40) (132/68 - 176/73)  BP(mean): --  RR: 18 (01 Mar 2018 12:40) (18 - 18)  SpO2: 97% (01 Mar 2018 12:40) (93% - 98%)  CAPILLARY BLOOD GLUCOSE      POCT Blood Glucose.: 146 mg/dL (01 Mar 2018 12:17)    I&O's Summary    28 Feb 2018 07:01  -  01 Mar 2018 07:00  --------------------------------------------------------  IN: 600 mL / OUT: 0 mL / NET: 600 mL    01 Mar 2018 07:01  -  01 Mar 2018 14:04  --------------------------------------------------------  IN: 600 mL / OUT: 300 mL / NET: 300 mL        PHYSICAL EXAM:  GENERAL: NAD  HEAD:  Atraumatic, Normocephalic  EYES: conjunctiva and sclera clear  NECK: No JVD  CHEST/LUNG: CTA b/l  HEART: S1 S2 RRR  ABDOMEN: +BS Soft, NT/ND  EXTREMITIES:  2+ DP Pulses, No c/c/e  NEUROLOGY: AAOx3, no focal deficits   SKIN: No rashes or lesions      LABS:                        9.7    5.5   )-----------( 157      ( 01 Mar 2018 07:18 )             29.3     03-01    130<L>  |  88<L>  |  54<H>  ----------------------------<  193<H>  5.1   |  21<L>  |  8.59<H>    Ca    9.8      01 Mar 2018 07:17  Mg     1.9     03-01    TPro  8.3  /  Alb  4.1  /  TBili  0.3  /  DBili  x   /  AST  19  /  ALT  8<L>  /  AlkPhos  88  02-27    PT/INR - ( 27 Feb 2018 22:13 )   PT: 11.9 sec;   INR: 1.10 ratio         PTT - ( 27 Feb 2018 22:13 )  PTT:30.1 sec  CARDIAC MARKERS ( 27 Feb 2018 22:13 )  x     / 0.06 ng/mL / 76 U/L / x     / 2.8 ng/mL          RADIOLOGY & ADDITIONAL TESTS:    Imaging Personally Reviewed:    Consultant(s) Notes Reviewed: Nephrology     Care Discussed with Consultants/Other Providers: d/w Neurosurgery MIKE Tolentino regarding plan of care cc: headache    HPI:  72M with VPS placed by Dr. Amaya ([facility]) 2 years ago for NPH, has had multiple falls, 2 weeks ago, and then today. Since the fall he has reported headache. No weakness/paresthesias/N/V or seizures. CTH performed by PCP for the headache demonstrated a R subacute SDH with~4mm MLS, and he was told to come to the emergency department. He takes baby asa at home, last dose day prior to admission. patient denies any headache. patient states he was walking and felt weak and legs gave out under him. no LOC. +head trauma to hard wood floor. no cp/sob/palpitations      PAST MEDICAL & SURGICAL HISTORY:  Hydrocephalus  Anemia  CAD (coronary artery disease)  DM (diabetes mellitus)  ESRD on peritoneal dialysis  COPD (chronic obstructive pulmonary disease)  S/P ventricular shunt placement  ESRD on dialysis: peritoneal dialysis catheter in place, left arm AV graft in place  S/P angioplasty with stent  S/P coronary artery bypass graft x 3      Review of Systems:   CONSTITUTIONAL: No fever  EYES: No eye pain, visual disturbances  ENMT:  No difficulty hearing,   NECK: No pain or stiffness  RESPIRATORY: No cough, No shortness of breath  CARDIOVASCULAR: No chest pain, palpitations,  GASTROINTESTINAL: No abdominal or epigastric pain. No nausea, vomiting,  GENITOURINARY: No dysuria,   NEUROLOGICAL: No headaches,   SKIN: No rashes  ENDOCRINE: No heat or cold intolerance  MUSCULOSKELETAL: No joint pain or swelling;   PSYCHIATRIC: No depression,   ALLERY AND IMMUNOLOGIC: No hives or eczema    Allergies    No Known Allergies    Intolerances        Social History: denies etoh abuse. +1ppd smoker (cut down from 2ppd x 40 years)    FAMILY HISTORY:  noncontributory      MEDICATIONS  (STANDING):  atorvastatin 40 milliGRAM(s) Oral at bedtime  dexamethasone     Tablet 4 milliGRAM(s) Oral every 6 hours  docusate sodium 100 milliGRAM(s) Oral three times a day  gabapentin 100 milliGRAM(s) Oral three times a day  insulin lispro (HumaLOG) corrective regimen sliding scale   SubCutaneous Before meals and at bedtime  levETIRAcetam 500 milliGRAM(s) Oral every 12 hours  magnesium oxide 400 milliGRAM(s) Oral daily  metoprolol     tartrate 50 milliGRAM(s) Oral two times a day  multivitamin 1 Tablet(s) Oral daily  nicotine - 21 mG/24Hr(s) Patch 1 patch Transdermal daily  omega-3-Acid Ethyl Esters 4 Gram(s) Oral daily  pantoprazole    Tablet 40 milliGRAM(s) Oral before breakfast  sertraline 100 milliGRAM(s) Oral daily  tiotropium 18 MICROgram(s) Capsule 1 Capsule(s) Inhalation daily    MEDICATIONS  (PRN):  acetaminophen   Tablet 650 milliGRAM(s) Oral every 6 hours PRN For Temp greater than 38 C (100.4 F)  acetaminophen   Tablet. 650 milliGRAM(s) Oral every 6 hours PRN Mild Pain (1 - 3)  ALBUTerol    90 MICROgram(s) HFA Inhaler 2 Puff(s) Inhalation every 4 hours PRN Bronchospasm  ondansetron   Disintegrating Tablet 4 milliGRAM(s) Oral every 6 hours PRN Nausea  senna 2 Tablet(s) Oral at bedtime PRN Constipation      Vital Signs Last 24 Hrs  T(C): 36.8 (01 Mar 2018 12:40), Max: 37.3 (01 Mar 2018 04:38)  T(F): 98.2 (01 Mar 2018 12:40), Max: 99.1 (01 Mar 2018 04:38)  HR: 61 (01 Mar 2018 12:40) (60 - 81)  BP: 161/71 (01 Mar 2018 12:40) (132/68 - 176/73)  BP(mean): --  RR: 18 (01 Mar 2018 12:40) (18 - 18)  SpO2: 97% (01 Mar 2018 12:40) (93% - 98%)  CAPILLARY BLOOD GLUCOSE      POCT Blood Glucose.: 146 mg/dL (01 Mar 2018 12:17)    I&O's Summary    28 Feb 2018 07:01  -  01 Mar 2018 07:00  --------------------------------------------------------  IN: 600 mL / OUT: 0 mL / NET: 600 mL    01 Mar 2018 07:01  -  01 Mar 2018 14:04  --------------------------------------------------------  IN: 600 mL / OUT: 300 mL / NET: 300 mL        PHYSICAL EXAM:  GENERAL: NAD  HEAD:  Atraumatic, Normocephalic  EYES: conjunctiva and sclera clear  NECK: No JVD  CHEST/LUNG: CTA b/l  HEART: S1 S2 RRR  ABDOMEN: +BS Soft, NT/ND  EXTREMITIES:  2+ DP Pulses, No c/c/e  NEUROLOGY: AAOx3, no focal deficits   SKIN: No rashes or lesions      LABS:                        9.7    5.5   )-----------( 157      ( 01 Mar 2018 07:18 )             29.3     03-01    130<L>  |  88<L>  |  54<H>  ----------------------------<  193<H>  5.1   |  21<L>  |  8.59<H>    Ca    9.8      01 Mar 2018 07:17  Mg     1.9     03-01    TPro  8.3  /  Alb  4.1  /  TBili  0.3  /  DBili  x   /  AST  19  /  ALT  8<L>  /  AlkPhos  88  02-27    PT/INR - ( 27 Feb 2018 22:13 )   PT: 11.9 sec;   INR: 1.10 ratio         PTT - ( 27 Feb 2018 22:13 )  PTT:30.1 sec  CARDIAC MARKERS ( 27 Feb 2018 22:13 )  x     / 0.06 ng/mL / 76 U/L / x     / 2.8 ng/mL          RADIOLOGY & ADDITIONAL TESTS:    Imaging Personally Reviewed:    Consultant(s) Notes Reviewed: Nephrology     Care Discussed with Consultants/Other Providers: d/w Neurosurgery MIKE Tolentino regarding plan of care

## 2018-03-02 DIAGNOSIS — R91.8 OTHER NONSPECIFIC ABNORMAL FINDING OF LUNG FIELD: ICD-10-CM

## 2018-03-02 DIAGNOSIS — J43.9 EMPHYSEMA, UNSPECIFIED: ICD-10-CM

## 2018-03-02 DIAGNOSIS — F17.200 NICOTINE DEPENDENCE, UNSPECIFIED, UNCOMPLICATED: ICD-10-CM

## 2018-03-02 DIAGNOSIS — I62.00 NONTRAUMATIC SUBDURAL HEMORRHAGE, UNSPECIFIED: ICD-10-CM

## 2018-03-02 LAB
GLUCOSE BLDC GLUCOMTR-MCNC: 110 MG/DL — HIGH (ref 70–99)
GLUCOSE BLDC GLUCOMTR-MCNC: 124 MG/DL — HIGH (ref 70–99)
GLUCOSE BLDC GLUCOMTR-MCNC: 155 MG/DL — HIGH (ref 70–99)
GLUCOSE BLDC GLUCOMTR-MCNC: 158 MG/DL — HIGH (ref 70–99)
PHOSPHATE SERPL-MCNC: 5 MG/DL — HIGH (ref 2.5–4.5)

## 2018-03-02 PROCEDURE — 99231 SBSQ HOSP IP/OBS SF/LOW 25: CPT

## 2018-03-02 PROCEDURE — 70450 CT HEAD/BRAIN W/O DYE: CPT | Mod: 26

## 2018-03-02 PROCEDURE — 99233 SBSQ HOSP IP/OBS HIGH 50: CPT

## 2018-03-02 PROCEDURE — 71250 CT THORAX DX C-: CPT | Mod: 26

## 2018-03-02 RX ORDER — AMLODIPINE BESYLATE 2.5 MG/1
5 TABLET ORAL DAILY
Qty: 0 | Refills: 0 | Status: DISCONTINUED | OUTPATIENT
Start: 2018-03-02 | End: 2018-03-05

## 2018-03-02 RX ORDER — ERYTHROPOIETIN 10000 [IU]/ML
10000 INJECTION, SOLUTION INTRAVENOUS; SUBCUTANEOUS
Qty: 0 | Refills: 0 | Status: DISCONTINUED | OUTPATIENT
Start: 2018-03-02 | End: 2018-03-05

## 2018-03-02 RX ORDER — METOPROLOL TARTRATE 50 MG
50 TABLET ORAL DAILY
Qty: 0 | Refills: 0 | Status: DISCONTINUED | OUTPATIENT
Start: 2018-03-02 | End: 2018-03-05

## 2018-03-02 RX ORDER — SEVELAMER CARBONATE 2400 MG/1
800 POWDER, FOR SUSPENSION ORAL
Qty: 0 | Refills: 0 | Status: DISCONTINUED | OUTPATIENT
Start: 2018-03-02 | End: 2018-03-05

## 2018-03-02 RX ADMIN — Medication 650 MILLIGRAM(S): at 09:25

## 2018-03-02 RX ADMIN — SERTRALINE 100 MILLIGRAM(S): 25 TABLET, FILM COATED ORAL at 12:19

## 2018-03-02 RX ADMIN — Medication 100 MILLIGRAM(S): at 21:15

## 2018-03-02 RX ADMIN — SEVELAMER CARBONATE 800 MILLIGRAM(S): 2400 POWDER, FOR SUSPENSION ORAL at 17:56

## 2018-03-02 RX ADMIN — LEVETIRACETAM 500 MILLIGRAM(S): 250 TABLET, FILM COATED ORAL at 05:13

## 2018-03-02 RX ADMIN — Medication 50 MILLIGRAM(S): at 17:54

## 2018-03-02 RX ADMIN — LEVETIRACETAM 500 MILLIGRAM(S): 250 TABLET, FILM COATED ORAL at 17:55

## 2018-03-02 RX ADMIN — Medication 4 MILLIGRAM(S): at 12:19

## 2018-03-02 RX ADMIN — Medication 50 MILLIGRAM(S): at 05:13

## 2018-03-02 RX ADMIN — Medication 100 MILLIGRAM(S): at 15:02

## 2018-03-02 RX ADMIN — ATORVASTATIN CALCIUM 40 MILLIGRAM(S): 80 TABLET, FILM COATED ORAL at 21:15

## 2018-03-02 RX ADMIN — Medication 4 MILLIGRAM(S): at 17:55

## 2018-03-02 RX ADMIN — MAGNESIUM OXIDE 400 MG ORAL TABLET 400 MILLIGRAM(S): 241.3 TABLET ORAL at 12:19

## 2018-03-02 RX ADMIN — Medication 650 MILLIGRAM(S): at 21:16

## 2018-03-02 RX ADMIN — Medication 1 PATCH: at 12:18

## 2018-03-02 RX ADMIN — Medication 1: at 22:36

## 2018-03-02 RX ADMIN — TIOTROPIUM BROMIDE 1 CAPSULE(S): 18 CAPSULE ORAL; RESPIRATORY (INHALATION) at 21:15

## 2018-03-02 RX ADMIN — Medication 650 MILLIGRAM(S): at 22:00

## 2018-03-02 RX ADMIN — GABAPENTIN 100 MILLIGRAM(S): 400 CAPSULE ORAL at 21:15

## 2018-03-02 RX ADMIN — Medication 1: at 12:18

## 2018-03-02 RX ADMIN — Medication 100 MILLIGRAM(S): at 05:13

## 2018-03-02 RX ADMIN — Medication 4 GRAM(S): at 12:18

## 2018-03-02 RX ADMIN — AMLODIPINE BESYLATE 5 MILLIGRAM(S): 2.5 TABLET ORAL at 17:54

## 2018-03-02 RX ADMIN — Medication 4 MILLIGRAM(S): at 05:13

## 2018-03-02 RX ADMIN — Medication 1 PATCH: at 12:19

## 2018-03-02 RX ADMIN — PANTOPRAZOLE SODIUM 40 MILLIGRAM(S): 20 TABLET, DELAYED RELEASE ORAL at 05:13

## 2018-03-02 RX ADMIN — Medication 4 MILLIGRAM(S): at 23:16

## 2018-03-02 RX ADMIN — Medication 1 TABLET(S): at 12:18

## 2018-03-02 RX ADMIN — GABAPENTIN 100 MILLIGRAM(S): 400 CAPSULE ORAL at 05:13

## 2018-03-02 RX ADMIN — GABAPENTIN 100 MILLIGRAM(S): 400 CAPSULE ORAL at 15:02

## 2018-03-02 NOTE — PROGRESS NOTE ADULT - SUBJECTIVE AND OBJECTIVE BOX
Patient is a 72y old  Male who presents with a chief complaint of Headache (28 Feb 2018 01:27)        SUBJECTIVE / OVERNIGHT EVENTS: no acute complaints        Home medications:           MEDICATIONS  (STANDING):  atorvastatin 40 milliGRAM(s) Oral at bedtime  dexamethasone     Tablet 4 milliGRAM(s) Oral every 6 hours  docusate sodium 100 milliGRAM(s) Oral three times a day  gabapentin 100 milliGRAM(s) Oral three times a day  insulin lispro (HumaLOG) corrective regimen sliding scale   SubCutaneous Before meals and at bedtime  levETIRAcetam 500 milliGRAM(s) Oral every 12 hours  magnesium oxide 400 milliGRAM(s) Oral daily  metoprolol     tartrate 50 milliGRAM(s) Oral two times a day  Nephro-brooke 1 Tablet(s) Oral daily  nicotine - 21 mG/24Hr(s) Patch 1 patch Transdermal daily  omega-3-Acid Ethyl Esters 4 Gram(s) Oral daily  pantoprazole    Tablet 40 milliGRAM(s) Oral before breakfast  sertraline 100 milliGRAM(s) Oral daily  tiotropium 18 MICROgram(s) Capsule 1 Capsule(s) Inhalation daily    MEDICATIONS  (PRN):  acetaminophen   Tablet 650 milliGRAM(s) Oral every 6 hours PRN For Temp greater than 38 C (100.4 F)  acetaminophen   Tablet. 650 milliGRAM(s) Oral every 6 hours PRN Mild Pain (1 - 3)  ALBUTerol    90 MICROgram(s) HFA Inhaler 2 Puff(s) Inhalation every 4 hours PRN Bronchospasm  ondansetron   Disintegrating Tablet 4 milliGRAM(s) Oral every 6 hours PRN Nausea  senna 2 Tablet(s) Oral at bedtime PRN Constipation      Vital Signs Last 24 Hrs  T(C): 36.7 (02 Mar 2018 08:22), Max: 36.9 (01 Mar 2018 21:20)  T(F): 98.1 (02 Mar 2018 08:22), Max: 98.5 (01 Mar 2018 21:20)  HR: 59 (02 Mar 2018 08:22) (59 - 66)  BP: 158/61 (02 Mar 2018 08:22) (131/64 - 161/71)  BP(mean): --  RR: 18 (02 Mar 2018 08:22) (18 - 18)  SpO2: 96% (02 Mar 2018 08:22) (94% - 97%)  CAPILLARY BLOOD GLUCOSE      POCT Blood Glucose.: 124 mg/dL (02 Mar 2018 08:20)  POCT Blood Glucose.: 258 mg/dL (01 Mar 2018 21:28)  POCT Blood Glucose.: 122 mg/dL (01 Mar 2018 18:15)  POCT Blood Glucose.: 146 mg/dL (01 Mar 2018 12:17)    I&O's Summary    01 Mar 2018 07:01  -  02 Mar 2018 07:00  --------------------------------------------------------  IN: 840 mL / OUT: 2300 mL / NET: -1460 mL        PHYSICAL EXAM:  GENERAL: NAD  HEAD:  Atraumatic, Normocephalic  EYES: conjunctiva and sclera clear  NECK: No JVD  CHEST/LUNG: CTA b/l  HEART: S1 S2 RRR  ABDOMEN: +BS Soft, NT/ND  EXTREMITIES:  2+ DP Pulses, No c/c/e  NEUROLOGY: AAOx3, no focal deficits   SKIN: No rashes or lesions    LABS:                        9.7    5.5   )-----------( 157      ( 01 Mar 2018 07:18 )             29.3     03-01    130<L>  |  88<L>  |  54<H>  ----------------------------<  193<H>  5.1   |  21<L>  |  8.59<H>    Ca    9.8      01 Mar 2018 07:17  Phos  5.0     03-02  Mg     1.9     03-01                RADIOLOGY & ADDITIONAL TESTS:    Imaging Personally Reviewed: xray shuntogram reviewed - Ventriculoperitoneal shunt catheter tip in the region of the   right mid thorax without discontinuity or kinking along its course  Consultant(s) Notes Reviewed: Nephrology    Care Discussed with Consultants/Other Providers: d/w Neurosurgery MIKE Tolentino regarding plan of care Patient is a 72y old  Male who presents with a chief complaint of Headache (28 Feb 2018 01:27)        SUBJECTIVE / OVERNIGHT EVENTS: no acute complaints        Home medications:   zoloft 100mg qd  lopressor 50mg qd (discussed with patient who was told to take 1/2 tab twice daily so decided it was better to take 1 tab daily)  auryxia 210mg qd  asa 81mg qd  fish oil  colace  prilosec 20mg qd  crestor 10mg qd  gabapentin 100mg tid  spiriva   albuterol prn (rarely uses)  kiki-brooke  magox 400mg qd        MEDICATIONS  (STANDING):  atorvastatin 40 milliGRAM(s) Oral at bedtime  dexamethasone     Tablet 4 milliGRAM(s) Oral every 6 hours  docusate sodium 100 milliGRAM(s) Oral three times a day  gabapentin 100 milliGRAM(s) Oral three times a day  insulin lispro (HumaLOG) corrective regimen sliding scale   SubCutaneous Before meals and at bedtime  levETIRAcetam 500 milliGRAM(s) Oral every 12 hours  magnesium oxide 400 milliGRAM(s) Oral daily  metoprolol     tartrate 50 milliGRAM(s) Oral two times a day  Nephro-brooke 1 Tablet(s) Oral daily  nicotine - 21 mG/24Hr(s) Patch 1 patch Transdermal daily  omega-3-Acid Ethyl Esters 4 Gram(s) Oral daily  pantoprazole    Tablet 40 milliGRAM(s) Oral before breakfast  sertraline 100 milliGRAM(s) Oral daily  tiotropium 18 MICROgram(s) Capsule 1 Capsule(s) Inhalation daily    MEDICATIONS  (PRN):  acetaminophen   Tablet 650 milliGRAM(s) Oral every 6 hours PRN For Temp greater than 38 C (100.4 F)  acetaminophen   Tablet. 650 milliGRAM(s) Oral every 6 hours PRN Mild Pain (1 - 3)  ALBUTerol    90 MICROgram(s) HFA Inhaler 2 Puff(s) Inhalation every 4 hours PRN Bronchospasm  ondansetron   Disintegrating Tablet 4 milliGRAM(s) Oral every 6 hours PRN Nausea  senna 2 Tablet(s) Oral at bedtime PRN Constipation      Vital Signs Last 24 Hrs  T(C): 36.7 (02 Mar 2018 08:22), Max: 36.9 (01 Mar 2018 21:20)  T(F): 98.1 (02 Mar 2018 08:22), Max: 98.5 (01 Mar 2018 21:20)  HR: 59 (02 Mar 2018 08:22) (59 - 66)  BP: 158/61 (02 Mar 2018 08:22) (131/64 - 161/71)  BP(mean): --  RR: 18 (02 Mar 2018 08:22) (18 - 18)  SpO2: 96% (02 Mar 2018 08:22) (94% - 97%)  CAPILLARY BLOOD GLUCOSE      POCT Blood Glucose.: 124 mg/dL (02 Mar 2018 08:20)  POCT Blood Glucose.: 258 mg/dL (01 Mar 2018 21:28)  POCT Blood Glucose.: 122 mg/dL (01 Mar 2018 18:15)  POCT Blood Glucose.: 146 mg/dL (01 Mar 2018 12:17)    I&O's Summary    01 Mar 2018 07:01  -  02 Mar 2018 07:00  --------------------------------------------------------  IN: 840 mL / OUT: 2300 mL / NET: -1460 mL        PHYSICAL EXAM:  GENERAL: NAD  HEAD:  Atraumatic, Normocephalic  EYES: conjunctiva and sclera clear  NECK: No JVD  CHEST/LUNG: CTA b/l  HEART: S1 S2 RRR  ABDOMEN: +BS Soft, NT/ND  EXTREMITIES:  2+ DP Pulses, No c/c/e  NEUROLOGY: AAOx3, no focal deficits   SKIN: No rashes or lesions    LABS:                        9.7    5.5   )-----------( 157      ( 01 Mar 2018 07:18 )             29.3     03-01    130<L>  |  88<L>  |  54<H>  ----------------------------<  193<H>  5.1   |  21<L>  |  8.59<H>    Ca    9.8      01 Mar 2018 07:17  Phos  5.0     03-02  Mg     1.9     03-01                RADIOLOGY & ADDITIONAL TESTS:    Imaging Personally Reviewed: ct chest reviewed - MIRLANDE opacity increased in size from 4/2016 from 2.5 cm to 3.3 cm - no ALEAH  Consultant(s) Notes Reviewed: Nephrology    Care Discussed with Consultants/Other Providers: d/w Neurosurgery MIKE Tolentino regarding plan of care

## 2018-03-02 NOTE — CONSULT NOTE ADULT - PROBLEM SELECTOR PROBLEM 3
Current every day smoker
Coronary artery disease involving native coronary artery of native heart without angina pectoris

## 2018-03-02 NOTE — PROGRESS NOTE ADULT - PROBLEM SELECTOR PLAN 2
s/p VPS -  shunt was raised from 2 to 2.5  management per Neurosurgery   xray shuntogram without discontinuity or kinking along its course

## 2018-03-02 NOTE — CONSULT NOTE ADULT - ATTENDING COMMENTS
as above  no further pulm workup needed
[facility]  [id number]

## 2018-03-02 NOTE — PROGRESS NOTE ADULT - ASSESSMENT
HPI:  72M with VPS placed by Dr. Amaya ([facility]) 2 years ago for NPH, has had multiple falls, 2 weeks ago, and then today. Since the fall he has reported headache. No weakness/paresthesias/N/V or seizures. CTH performed by PCP for the headache demonstrated a R subacute SDH with~4mm MLS, and he was told to come to the emergency department. He takes baby asa at home, last dose yesterday. (28 Feb 2018 01:27)    PLAN:  -Shunt reprogrammed to 2.5 on 2/28  - shunt series without evidence of discontinuity or kinking  -Continue Keppra for seizure prophylaxis  -Continue decadron 4q6 for headaches  -Continue Protonix for GI prophylaxis while on steroids  -Continue Neurontin for hx of neuropathy  -Continue Albuterol nebs PRN for hx of COPD  -Continue HD T/Th/Sat for hx of ESRD; will be dialyzed today; NO HEPARIN with dialysis  -Encouraged incentive spirometer  -Encouraged mobilization  -DVT ppx: bilateral venodynes; off chemoprophylaxis for now due to subdural hematoma  -Dispo: PT-LUIZ  -Discussed with Dr. Oc Bedoya #[id number] HPI:  72M with VPS placed by Dr. Amaya ([facility]) 2 years ago for NPH, has had multiple falls, 2 weeks ago, and then today. Since the fall he has reported headache. No weakness/paresthesias/N/V or seizures. CTH performed by PCP for the headache demonstrated a R subacute SDH with~4mm MLS, and he was told to come to the emergency department. He takes baby asa at home, last dose yesterday. (28 Feb 2018 01:27)    PLAN:  -Ventriculoatrial hunt reprogrammed to 2.5 on 2/28  -Continue Keppra for seizure prophylaxis  -Continue decadron taper for headaches  -Pulmonology consult called for MIRLANDE ground glass opacity; patient states this is a known lesion that has been followed as an outpatient but he did not know it was increased in size  -Continue Protonix for GI prophylaxis while on steroids  -Continue Neurontin for hx of neuropathy  -Continue Albuterol nebs PRN for hx of COPD  -Continue HD T/Th/Sat for hx of ESRD; NO HEPARIN with dialysis  -Encouraged incentive spirometer  -Encouraged mobilization  -DVT ppx: bilateral venodynes; off chemoprophylaxis for now due to subdural hematoma  -Dispo: PT-[facility]UIZ  -Discussed with Dr. Oc Bedoya #[id number]

## 2018-03-02 NOTE — PROGRESS NOTE ADULT - ASSESSMENT
72M with VPS placed by Dr. Amaya ([facility]) 2 years ago for NPH, has had multiple falls, 2 weeks ago, and then 2/28 found to have R subacute SDH with~4mm MLS.

## 2018-03-02 NOTE — CONSULT NOTE ADULT - PROBLEM SELECTOR RECOMMENDATION 2
s/p VPS -  shunt was raised from 2 to 2.5  management per Neurosurgery   f/u CT head and Ct chest
cont spiriva

## 2018-03-02 NOTE — CONSULT NOTE ADULT - SUBJECTIVE AND OBJECTIVE BOX
PULMONARY CONSULT      HPI: 71 y/o M with PMH of NPH s/p VPS, ESRD-HD, COPD-emphysema, current smoker, CAD s/p CABG x3/PCI, DMT2, multiple falls, presents from PCP office 2/28 with headache. Outpt CT head demonstrated subacute R SDH with midline shift, hydrocephalus. No acute neurosurgical intervention. Shunt reprogrammed for hydrocephalus. Incidentally noted MIRLANDE mass, progressed from CT 12/2016 concerning for malignancy. Called to eval.     Outpt pulm: Dr. Dayton Bain ([facility])      PAST MEDICAL & SURGICAL HISTORY:  Hydrocephalus  Anemia  CAD (coronary artery disease)  DM (diabetes mellitus)  ESRD on peritoneal dialysis  COPD (chronic obstructive pulmonary disease)  S/P ventricular shunt placement  ESRD on dialysis: peritoneal dialysis catheter in place, left arm AV graft in place  S/P angioplasty with stent  S/P coronary artery bypass graft x 3    Allergies    No Known Allergies    Intolerances      FAMILY HISTORY:  No pertinent family history in first degree relatives    Social history: Current Smoker     Review of Systems:  CONSTITUTIONAL: No fever, chills, or fatigue  EYES: No eye pain, visual disturbances, or discharge  ENMT:  No difficulty hearing, tinnitus, vertigo; No sinus or throat pain  NECK: No pain or stiffness  RESPIRATORY: Per above  CARDIOVASCULAR: No chest pain, palpitations, dizziness, or leg swelling  GASTROINTESTINAL: No abdominal or epigastric pain. No nausea, vomiting, or hematemesis; No diarrhea or constipation. No melena or hematochezia.  GENITOURINARY: No dysuria, frequency, hematuria, or incontinence  NEUROLOGICAL: No headaches, memory loss, loss of strength, numbness, or tremors  SKIN: No itching, burning, rashes, or lesions   MUSCULOSKELETAL: No joint pain or swelling; No muscle, back, or extremity pain  PSYCHIATRIC: No depression, anxiety, mood swings, or difficulty sleeping      Medications:  MEDICATIONS  (STANDING):  amLODIPine   Tablet 5 milliGRAM(s) Oral daily  atorvastatin 40 milliGRAM(s) Oral at bedtime  dexamethasone     Tablet 4 milliGRAM(s) Oral every 6 hours  docusate sodium 100 milliGRAM(s) Oral three times a day  epoetin alex Injectable 44631 Unit(s) IV Push <User Schedule>  gabapentin 100 milliGRAM(s) Oral three times a day  insulin lispro (HumaLOG) corrective regimen sliding scale   SubCutaneous Before meals and at bedtime  levETIRAcetam 500 milliGRAM(s) Oral every 12 hours  metoprolol succinate ER 50 milliGRAM(s) Oral daily  Nephro-brooke 1 Tablet(s) Oral daily  nicotine - 21 mG/24Hr(s) Patch 1 patch Transdermal daily  omega-3-Acid Ethyl Esters 4 Gram(s) Oral daily  pantoprazole    Tablet 40 milliGRAM(s) Oral before breakfast  sertraline 100 milliGRAM(s) Oral daily  sevelamer hydrochloride 800 milliGRAM(s) Oral three times a day with meals  tiotropium 18 MICROgram(s) Capsule 1 Capsule(s) Inhalation daily    MEDICATIONS  (PRN):  acetaminophen   Tablet 650 milliGRAM(s) Oral every 6 hours PRN For Temp greater than 38 C (100.4 F)  acetaminophen   Tablet. 650 milliGRAM(s) Oral every 6 hours PRN Mild Pain (1 - 3)  ALBUTerol    90 MICROgram(s) HFA Inhaler 2 Puff(s) Inhalation every 4 hours PRN Bronchospasm  ondansetron   Disintegrating Tablet 4 milliGRAM(s) Oral every 6 hours PRN Nausea  senna 2 Tablet(s) Oral at bedtime PRN Constipation            Vital Signs Last 24 Hrs  T(C): 36.6 (02 Mar 2018 12:46), Max: 36.9 (01 Mar 2018 21:20)  T(F): 97.8 (02 Mar 2018 12:46), Max: 98.5 (01 Mar 2018 21:20)  HR: 56 (02 Mar 2018 12:46) (56 - 66)  BP: 166/69 (02 Mar 2018 12:46) (131/64 - 166/69)  BP(mean): --  RR: 18 (02 Mar 2018 12:46) (18 - 18)  SpO2: 95% (02 Mar 2018 12:46) (94% - 96%)            03-01 @ 07:01  -  03-02 @ 07:00  --------------------------------------------------------  IN: 840 mL / OUT: 2300 mL / NET: -1460 mL          LABS:                        9.7    5.5   )-----------( 157      ( 01 Mar 2018 07:18 )             29.3     03-01    130<L>  |  88<L>  |  54<H>  ----------------------------<  193<H>  5.1   |  21<L>  |  8.59<H>    Ca    9.8      01 Mar 2018 07:17  Phos  5.0     03-02  Mg     1.9     03-01            CAPILLARY BLOOD GLUCOSE      POCT Blood Glucose.: 155 mg/dL (02 Mar 2018 12:11)                      CULTURES: (if applicable)        Physical Examination:    General: No acute distress.      HEENT: Pupils equal, reactive to light.  Symmetric.    PULM: Clear to auscultation bilaterally, no significant sputum production    CVS: S1, S2    ABD: Soft, nondistended, nontender, normoactive bowel sounds, no masses    EXT: No edema, nontender    SKIN: Warm and well perfused, no rashes noted.    NEURO: Alert, oriented, interactive, nonfocal    RADIOLOGY REVIEWED  CT chest: < from: CT Chest No Cont (03.02.18 @ 10:13) >  There are no pathologically enlarged bilateral axillary, mediastinal or   hilar lymph nodes. There is partially imaged catheter within the left   upper hemithorax subcutaneous tissues likely corresponding to the   patient's given history of shunt coursing through the left subclavian   vein, brachiocephalic vein and terminating within the superior vena   cava/right atrial junction. There is no discontinuity or fractures   involving the catheter. The heart size is enlarged. There is no   pericardial effusion. The main pulmonary arteries enlarged measuring   about 3.5 cm as on the prior study which can be seen in the setting of   pulmonary arterial hypertension. The patient is status post CABG. There   is atherosclerotic disease of the aorta. There are no pleural effusions.    Evaluation of the upper abdominal organs demonstrate nodular thickening   of the left adrenal gland unchanged. The kidneys are shrunken. There are   right renal hypoechoic and hyperdense lesions which are predominantly   unchanged since the prior study. For reference there is a 3 cm hypodense   lesion within the upper pole of the right kidney likely a cyst.    Evaluation of the lungs demonstrate a left upper lobe mixed solid and   groundglass irregular opacity, part of which measures about 3.3 cm   surrounding cluster of cysts or a cyst with multiple internal septation   with overall interval increase in size and progression since the prior   chest CT of April 27, 2016 where it measured about 2.5 cm as measured on   a similar axial slice. There is a solid nodular component along the   inferior aspect of this lesion measuring about 1 cm on image 36 of series   3 with interval increase in size since April 27, 2016 where it measured   about 3 mm. There is emphysema. There are a few scattered bilateral   subcentimeter nodular opacities with the largest within the left lower   lobe measuring up to 6 mm on image 122 of series 3 which are unchanged   since April 27, 2016. There is no pneumonia. There are no central   endobronchial lesions.    Evaluation of the bones demonstrate no significant abnormality.   Sternotomy wires are noted.    IMPRESSION: Left upper lobe mixed solid and groundglass opacity as   described above with interval progression since April 27, 2016 worrisome   for a primary lung neoplasm.    Emphysema.    Partially imaged left hemithorax catheter is intact.    < end of copied text > PULMONARY CONSULT      HPI: 73 y/o M with PMH of NPH s/p VPS, ESRD-HD, COPD-emphysema, current smoker, CAD s/p CABG x3/PCI, DMT2, multiple falls, presents from PCP office 2/28 with headache. Outpt CT head demonstrated subacute R SDH with midline shift. No acute neurosurgical intervention. Incidentally noted MIRLANDE mass, progressed from CT 12/2016 concerning for malignancy. Called to eval.     Outpt pulm: Dr. Dayton Bain ([facility])      PAST MEDICAL & SURGICAL HISTORY:  Hydrocephalus  Anemia  CAD (coronary artery disease)  DM (diabetes mellitus)  ESRD on peritoneal dialysis  COPD (chronic obstructive pulmonary disease)  S/P ventricular shunt placement  ESRD on dialysis: peritoneal dialysis catheter in place, left arm AV graft in place  S/P angioplasty with stent  S/P coronary artery bypass graft x 3    Allergies    No Known Allergies    Intolerances      FAMILY HISTORY:  No pertinent family history in first degree relatives    Social history: Current Smoker     Review of Systems:  CONSTITUTIONAL: No fever, chills, or fatigue  EYES: No eye pain, visual disturbances, or discharge  ENMT:  No difficulty hearing, tinnitus, vertigo; No sinus or throat pain  NECK: No pain or stiffness  RESPIRATORY: Per above  CARDIOVASCULAR: No chest pain, palpitations, dizziness, or leg swelling  GASTROINTESTINAL: No abdominal or epigastric pain. No nausea, vomiting, or hematemesis; No diarrhea or constipation. No melena or hematochezia.  GENITOURINARY: No dysuria, frequency, hematuria, or incontinence  NEUROLOGICAL: No headaches, memory loss, loss of strength, numbness, or tremors  SKIN: No itching, burning, rashes, or lesions   MUSCULOSKELETAL: No joint pain or swelling; No muscle, back, or extremity pain  PSYCHIATRIC: No depression, anxiety, mood swings, or difficulty sleeping      Medications:  MEDICATIONS  (STANDING):  amLODIPine   Tablet 5 milliGRAM(s) Oral daily  atorvastatin 40 milliGRAM(s) Oral at bedtime  dexamethasone     Tablet 4 milliGRAM(s) Oral every 6 hours  docusate sodium 100 milliGRAM(s) Oral three times a day  epoetin alex Injectable 56462 Unit(s) IV Push <User Schedule>  gabapentin 100 milliGRAM(s) Oral three times a day  insulin lispro (HumaLOG) corrective regimen sliding scale   SubCutaneous Before meals and at bedtime  levETIRAcetam 500 milliGRAM(s) Oral every 12 hours  metoprolol succinate ER 50 milliGRAM(s) Oral daily  Nephro-brooke 1 Tablet(s) Oral daily  nicotine - 21 mG/24Hr(s) Patch 1 patch Transdermal daily  omega-3-Acid Ethyl Esters 4 Gram(s) Oral daily  pantoprazole    Tablet 40 milliGRAM(s) Oral before breakfast  sertraline 100 milliGRAM(s) Oral daily  sevelamer hydrochloride 800 milliGRAM(s) Oral three times a day with meals  tiotropium 18 MICROgram(s) Capsule 1 Capsule(s) Inhalation daily    MEDICATIONS  (PRN):  acetaminophen   Tablet 650 milliGRAM(s) Oral every 6 hours PRN For Temp greater than 38 C (100.4 F)  acetaminophen   Tablet. 650 milliGRAM(s) Oral every 6 hours PRN Mild Pain (1 - 3)  ALBUTerol    90 MICROgram(s) HFA Inhaler 2 Puff(s) Inhalation every 4 hours PRN Bronchospasm  ondansetron   Disintegrating Tablet 4 milliGRAM(s) Oral every 6 hours PRN Nausea  senna 2 Tablet(s) Oral at bedtime PRN Constipation            Vital Signs Last 24 Hrs  T(C): 36.6 (02 Mar 2018 12:46), Max: 36.9 (01 Mar 2018 21:20)  T(F): 97.8 (02 Mar 2018 12:46), Max: 98.5 (01 Mar 2018 21:20)  HR: 56 (02 Mar 2018 12:46) (56 - 66)  BP: 166/69 (02 Mar 2018 12:46) (131/64 - 166/69)  BP(mean): --  RR: 18 (02 Mar 2018 12:46) (18 - 18)  SpO2: 95% (02 Mar 2018 12:46) (94% - 96%)            03-01 @ 07:01  -  03-02 @ 07:00  --------------------------------------------------------  IN: 840 mL / OUT: 2300 mL / NET: -1460 mL          LABS:                        9.7    5.5   )-----------( 157      ( 01 Mar 2018 07:18 )             29.3     03-01    130<L>  |  88<L>  |  54<H>  ----------------------------<  193<H>  5.1   |  21<L>  |  8.59<H>    Ca    9.8      01 Mar 2018 07:17  Phos  5.0     03-02  Mg     1.9     03-01            CAPILLARY BLOOD GLUCOSE      POCT Blood Glucose.: 155 mg/dL (02 Mar 2018 12:11)                      CULTURES: (if applicable)        Physical Examination:    General: No acute distress.      HEENT: Pupils equal, reactive to light.  Symmetric.    PULM: Clear to auscultation bilaterally, no significant sputum production    CVS: S1, S2    ABD: Soft, nondistended, nontender, normoactive bowel sounds, no masses    EXT: No edema, nontender    SKIN: Warm and well perfused, no rashes noted.    NEURO: Alert, oriented, interactive, nonfocal    RADIOLOGY REVIEWED  CT chest: < from: CT Chest No Cont (03.02.18 @ 10:13) >  There are no pathologically enlarged bilateral axillary, mediastinal or   hilar lymph nodes. There is partially imaged catheter within the left   upper hemithorax subcutaneous tissues likely corresponding to the   patient's given history of shunt coursing through the left subclavian   vein, brachiocephalic vein and terminating within the superior vena   cava/right atrial junction. There is no discontinuity or fractures   involving the catheter. The heart size is enlarged. There is no   pericardial effusion. The main pulmonary arteries enlarged measuring   about 3.5 cm as on the prior study which can be seen in the setting of   pulmonary arterial hypertension. The patient is status post CABG. There   is atherosclerotic disease of the aorta. There are no pleural effusions.    Evaluation of the upper abdominal organs demonstrate nodular thickening   of the left adrenal gland unchanged. The kidneys are shrunken. There are   right renal hypoechoic and hyperdense lesions which are predominantly   unchanged since the prior study. For reference there is a 3 cm hypodense   lesion within the upper pole of the right kidney likely a cyst.    Evaluation of the lungs demonstrate a left upper lobe mixed solid and   groundglass irregular opacity, part of which measures about 3.3 cm   surrounding cluster of cysts or a cyst with multiple internal septation   with overall interval increase in size and progression since the prior   chest CT of April 27, 2016 where it measured about 2.5 cm as measured on   a similar axial slice. There is a solid nodular component along the   inferior aspect of this lesion measuring about 1 cm on image 36 of series   3 with interval increase in size since April 27, 2016 where it measured   about 3 mm. There is emphysema. There are a few scattered bilateral   subcentimeter nodular opacities with the largest within the left lower   lobe measuring up to 6 mm on image 122 of series 3 which are unchanged   since April 27, 2016. There is no pneumonia. There are no central   endobronchial lesions.    Evaluation of the bones demonstrate no significant abnormality.   Sternotomy wires are noted.    IMPRESSION: Left upper lobe mixed solid and groundglass opacity as   described above with interval progression since April 27, 2016 worrisome   for a primary lung neoplasm.    Emphysema.    Partially imaged left hemithorax catheter is intact.    < end of copied text > PULMONARY CONSULT      HPI: 71 y/o M with PMH of NPH s/p VPS, ESRD-HD, COPD-emphysema, current smoker, CAD s/p CABG x3/PCI, DMT2, multiple falls, presents from PCP office 2/28 with headache. Outpt CT head demonstrated subacute R SDH with midline shift. No acute neurosurgical intervention. Incidentally noted MIRLANDE mass, progressed from CT 12/2016 concerning for malignancy. Called to eval.     Outpt pulm: Dr. Dayton Bain ([facility])      PAST MEDICAL & SURGICAL HISTORY:  Hydrocephalus  Anemia  CAD (coronary artery disease)  DM (diabetes mellitus)  ESRD on peritoneal dialysis  COPD (chronic obstructive pulmonary disease)  S/P ventricular shunt placement  ESRD on dialysis: peritoneal dialysis catheter in place, left arm AV graft in place  S/P angioplasty with stent  S/P coronary artery bypass graft x 3    Allergies    No Known Allergies    Intolerances      FAMILY HISTORY:  No pertinent family history in first degree relatives    Social history: Current Smoker     Review of Systems:  CONSTITUTIONAL: No fever, chills, or fatigue  EYES: No eye pain, visual disturbances, or discharge  ENMT:  No difficulty hearing, tinnitus, vertigo; No sinus or throat pain  NECK: No pain or stiffness  RESPIRATORY: Per above  CARDIOVASCULAR: No chest pain, palpitations, dizziness, or leg swelling  GASTROINTESTINAL: No abdominal or epigastric pain. No nausea, vomiting, or hematemesis; No diarrhea or constipation. No melena or hematochezia.  GENITOURINARY: No dysuria, frequency, hematuria, or incontinence  NEUROLOGICAL: No headaches, memory loss, loss of strength, numbness, or tremors  SKIN: No itching, burning, rashes, or lesions   MUSCULOSKELETAL: No joint pain or swelling; No muscle, back, or extremity pain  PSYCHIATRIC: No depression, anxiety, mood swings, or difficulty sleeping    Medications:  MEDICATIONS  (STANDING):  amLODIPine   Tablet 5 milliGRAM(s) Oral daily  atorvastatin 40 milliGRAM(s) Oral at bedtime  dexamethasone     Tablet 4 milliGRAM(s) Oral every 6 hours  docusate sodium 100 milliGRAM(s) Oral three times a day  epoetin alex Injectable 85828 Unit(s) IV Push <User Schedule>  gabapentin 100 milliGRAM(s) Oral three times a day  insulin lispro (HumaLOG) corrective regimen sliding scale   SubCutaneous Before meals and at bedtime  levETIRAcetam 500 milliGRAM(s) Oral every 12 hours  metoprolol succinate ER 50 milliGRAM(s) Oral daily  Nephro-brooke 1 Tablet(s) Oral daily  nicotine - 21 mG/24Hr(s) Patch 1 patch Transdermal daily  omega-3-Acid Ethyl Esters 4 Gram(s) Oral daily  pantoprazole    Tablet 40 milliGRAM(s) Oral before breakfast  sertraline 100 milliGRAM(s) Oral daily  sevelamer hydrochloride 800 milliGRAM(s) Oral three times a day with meals  tiotropium 18 MICROgram(s) Capsule 1 Capsule(s) Inhalation daily    MEDICATIONS  (PRN):  acetaminophen   Tablet 650 milliGRAM(s) Oral every 6 hours PRN For Temp greater than 38 C (100.4 F)  acetaminophen   Tablet. 650 milliGRAM(s) Oral every 6 hours PRN Mild Pain (1 - 3)  ALBUTerol    90 MICROgram(s) HFA Inhaler 2 Puff(s) Inhalation every 4 hours PRN Bronchospasm  ondansetron   Disintegrating Tablet 4 milliGRAM(s) Oral every 6 hours PRN Nausea  senna 2 Tablet(s) Oral at bedtime PRN Constipation    Vital Signs Last 24 Hrs  T(C): 36.6 (02 Mar 2018 12:46), Max: 36.9 (01 Mar 2018 21:20)  T(F): 97.8 (02 Mar 2018 12:46), Max: 98.5 (01 Mar 2018 21:20)  HR: 56 (02 Mar 2018 12:46) (56 - 66)  BP: 166/69 (02 Mar 2018 12:46) (131/64 - 166/69)  BP(mean): --  RR: 18 (02 Mar 2018 12:46) (18 - 18)  SpO2: 95% (02 Mar 2018 12:46) (94% - 96%)    03-01 @ 07:01  -  03-02 @ 07:00  --------------------------------------------------------  IN: 840 mL / OUT: 2300 mL / NET: -1460 mL      LABS:                        9.7    5.5   )-----------( 157      ( 01 Mar 2018 07:18 )             29.3     03-01    130<L>  |  88<L>  |  54<H>  ----------------------------<  193<H>  5.1   |  21<L>  |  8.59<H>    Ca    9.8      01 Mar 2018 07:17  Phos  5.0     03-02  Mg     1.9     03-01    CAPILLARY BLOOD GLUCOSE      POCT Blood Glucose.: 155 mg/dL (02 Mar 2018 12:11)      CULTURES: (if applicable)        Physical Examination:    General: No acute distress.      HEENT: Pupils equal, reactive to light.  Symmetric.    PULM: Decreased BS at bases    CVS: S1, S2    ABD: Soft, nondistended, nontender, normoactive bowel sounds, no masses    EXT: No edema, nontender    SKIN: Warm and well perfused, no rashes noted.    NEURO: Alert, oriented, interactive, nonfocal    RADIOLOGY REVIEWED  CT chest: < from: CT Chest No Cont (03.02.18 @ 10:13) >  There are no pathologically enlarged bilateral axillary, mediastinal or   hilar lymph nodes. There is partially imaged catheter within the left   upper hemithorax subcutaneous tissues likely corresponding to the   patient's given history of shunt coursing through the left subclavian   vein, brachiocephalic vein and terminating within the superior vena   cava/right atrial junction. There is no discontinuity or fractures   involving the catheter. The heart size is enlarged. There is no   pericardial effusion. The main pulmonary arteries enlarged measuring   about 3.5 cm as on the prior study which can be seen in the setting of   pulmonary arterial hypertension. The patient is status post CABG. There   is atherosclerotic disease of the aorta. There are no pleural effusions.    Evaluation of the upper abdominal organs demonstrate nodular thickening   of the left adrenal gland unchanged. The kidneys are shrunken. There are   right renal hypoechoic and hyperdense lesions which are predominantly   unchanged since the prior study. For reference there is a 3 cm hypodense   lesion within the upper pole of the right kidney likely a cyst.    Evaluation of the lungs demonstrate a left upper lobe mixed solid and   groundglass irregular opacity, part of which measures about 3.3 cm   surrounding cluster of cysts or a cyst with multiple internal septation   with overall interval increase in size and progression since the prior   chest CT of April 27, 2016 where it measured about 2.5 cm as measured on   a similar axial slice. There is a solid nodular component along the   inferior aspect of this lesion measuring about 1 cm on image 36 of series   3 with interval increase in size since April 27, 2016 where it measured   about 3 mm. There is emphysema. There are a few scattered bilateral   subcentimeter nodular opacities with the largest within the left lower   lobe measuring up to 6 mm on image 122 of series 3 which are unchanged   since April 27, 2016. There is no pneumonia. There are no central   endobronchial lesions.    Evaluation of the bones demonstrate no significant abnormality.   Sternotomy wires are noted.    IMPRESSION: Left upper lobe mixed solid and groundglass opacity as   described above with interval progression since April 27, 2016 worrisome   for a primary lung neoplasm.    Emphysema.    Partially imaged left hemithorax catheter is intact.    < end of copied text >

## 2018-03-02 NOTE — PROGRESS NOTE ADULT - PROBLEM SELECTOR PLAN 5
TTS  phos 5.0 - will discuss with renal restarting phosphate binder  nephrovite TTS  phos 5.0 - d/w renal - no need for phosphate binder at this time.  nephrovite TTS  phos 5.0 - d/w renal - no need for phosphate binder at this time.  nephrovite  bp elevated even after HD - started on norvasc

## 2018-03-02 NOTE — PROGRESS NOTE ADULT - SUBJECTIVE AND OBJECTIVE BOX
SUBJECTIVE: Patient seen and examined at bedside. Headaches improved today. Denies nausea/vomiting, vision changes.     Vital Signs Last 24 Hrs  T(C): 36.7 (03-02-18 @ 16:14), Max: 36.9 (03-01-18 @ 21:20)  T(F): 98.1 (03-02-18 @ 16:14), Max: 98.5 (03-01-18 @ 21:20)  HR: 57 (03-02-18 @ 16:14) (56 - 66)  BP: 147/64 (03-02-18 @ 16:14) (131/64 - 166/69)  RR: 18 (03-02-18 @ 16:14) (18 - 18)  SpO2: 95% (03-02-18 @ 16:14) (94% - 96%)    PHYSICAL EXAM:    Constitutional: No Acute Distress, resting comfortably in bed    Neurological: Awake, alert, oriented to person, place and year, speech clear, briskly following commands, Moving all Extremities with 5/5 strength, no drift; sensation intact to light touch, pupils 3mm and briskly reactive, extraocular movements intact    Pulmonary:  No rales, No rhonchi, No wheezes - decreased breath sounds at bilateral bases    Cardiovascular: S1, S2, Regular rate and rhythm     Gastrointestinal: Soft, Non-tender, Non-distended     Extremities: No calf tenderness bilaterally        LABS:                          9.7    5.5   )-----------( 157      ( 01 Mar 2018 07:18 )             29.3   03-01    130<L>  |  88<L>  |  54<H>  ----------------------------<  193<H>  5.1   |  21<L>  |  8.59<H>    Ca    9.8      01 Mar 2018 07:17  Mg     1.9     03-01    TPro  8.3  /  Alb  4.1  /  TBili  0.3  /  DBili  x   /  AST  19  /  ALT  8<L>  /  AlkPhos  88  02-27        IMAGING:     3/1 CT head: Acute on chronic subdural hematoma again seen as described above.    3/1 CT chest no contrast: Left upper lobe mixed solid and ground-glass opacity as described above with interval progression since April 27, 2016 worrisome for a primary lung neoplasm. Emphysema. Partially imaged left hemithorax catheter is intact.    2/28 CT head no contrast: No change from 12:38 AM. Right frontal parietal mixed density subdural collection with a small amount of acute hemorrhage unchanged. Small low density left frontal parietal collection without significant   mass effect.    MEDICATIONS:  Antibiotics:    Neuro:  acetaminophen   Tablet 650 milliGRAM(s) Oral every 6 hours PRN For Temp greater than 38 C (100.4 F)  acetaminophen   Tablet. 650 milliGRAM(s) Oral every 6 hours PRN Mild Pain (1 - 3)  gabapentin 100 milliGRAM(s) Oral three times a day  levETIRAcetam 500 milliGRAM(s) Oral every 12 hours  ondansetron   Disintegrating Tablet 4 milliGRAM(s) Oral every 6 hours PRN Nausea  sertraline 100 milliGRAM(s) Oral daily    Cardiac:  metoprolol     tartrate 50 milliGRAM(s) Oral two times a day    Pulm:  ALBUTerol    90 MICROgram(s) HFA Inhaler 2 Puff(s) Inhalation every 4 hours PRN Bronchospasm  tiotropium 18 MICROgram(s) Capsule 1 Capsule(s) Inhalation daily    GI/:  docusate sodium 100 milliGRAM(s) Oral three times a day  pantoprazole    Tablet 40 milliGRAM(s) Oral before breakfast  senna 2 Tablet(s) Oral at bedtime PRN Constipation    Other:   atorvastatin 40 milliGRAM(s) Oral at bedtime  dexamethasone     Tablet 4 milliGRAM(s) Oral every 6 hours  magnesium oxide 400 milliGRAM(s) Oral daily  multivitamin 1 Tablet(s) Oral daily  nicotine - 21 mG/24Hr(s) Patch 1 patch Transdermal daily  omega-3-Acid Ethyl Esters 4 Gram(s) Oral daily        DIET: CCD Renal

## 2018-03-02 NOTE — PROGRESS NOTE ADULT - SUBJECTIVE AND OBJECTIVE BOX
Pt feels better.  Only mild headache at times.  VARGAS well no drift.  Pt with CT head which was stable.  CT chest with left lung lesion and terminus of shunt in right SVC/Atrium.  Continue PT.  Plan for repeat CT on Monday.  Dex taper to start tomorrow:  2 mg q6h x 3 days, 1 mg q6h x 3 days, 1 mg bid x 3 days, 0.5 mg bid x 3days, 0.5 mg qd x 3 days, then stop.

## 2018-03-02 NOTE — PROGRESS NOTE ADULT - PROBLEM SELECTOR PLAN 1
w/ 4 mm MLS on CTH  due to SDH  AED, steroid management per neurosurgery. w/ 4 mm MLS on CTH  due to SDH  AED, steroid management per neurosurgery.  repeat CTH with slight increase in size of SDH 1.3cm to 1.5cm

## 2018-03-02 NOTE — PROGRESS NOTE ADULT - PROBLEM SELECTOR PLAN 4
current smoker - counselled on smoking cessation  c/w nicotine patch  c/w albuterol, spiriva. current smoker - counselled on smoking cessation  MIRLANDE opacity concern for malignancy? - no symptoms of pna - pulm consult - outpatient pulm Dr. Bain [phone]  c/w nicotine patch  c/w albuterol, spiriva.

## 2018-03-02 NOTE — PROGRESS NOTE ADULT - PROBLEM SELECTOR PLAN 3
s/p pci then cabg in past   c/w aspirin  no exertional chest pain. s/p pci then cabg in past   aspirin held by neurosurgery  no exertional chest pain.

## 2018-03-02 NOTE — PROGRESS NOTE ADULT - SUBJECTIVE AND OBJECTIVE BOX
Denies CP, SOB, HA    Vital Signs Last 24 Hrs  T(C): 36.7 (03-02-18 @ 16:14), Max: 36.9 (03-01-18 @ 21:20)  T(F): 98.1 (03-02-18 @ 16:14), Max: 98.5 (03-01-18 @ 21:20)  HR: 57 (03-02-18 @ 16:14) (56 - 65)  BP: 147/64 (03-02-18 @ 16:14) (131/64 - 166/69)  BP(mean): --  RR: 18 (03-02-18 @ 16:14) (18 - 18)  SpO2: 95% (03-02-18 @ 16:14) (94% - 96%)    Gen - no distress  Card - S1S2  Lungs - good air entry b/l  Abd - soft, NT, ND, + BS  Extr - tr edema, AVF + bruit    acetaminophen   Tablet 650 milliGRAM(s) Oral every 6 hours PRN  acetaminophen   Tablet. 650 milliGRAM(s) Oral every 6 hours PRN  ALBUTerol    90 MICROgram(s) HFA Inhaler 2 Puff(s) Inhalation every 4 hours PRN  amLODIPine   Tablet 5 milliGRAM(s) Oral daily  atorvastatin 40 milliGRAM(s) Oral at bedtime  dexamethasone     Tablet 4 milliGRAM(s) Oral every 6 hours  docusate sodium 100 milliGRAM(s) Oral three times a day  epoetin alex Injectable 18518 Unit(s) IV Push <User Schedule>  gabapentin 100 milliGRAM(s) Oral three times a day  insulin lispro (HumaLOG) corrective regimen sliding scale   SubCutaneous Before meals and at bedtime  levETIRAcetam 500 milliGRAM(s) Oral every 12 hours  metoprolol succinate ER 50 milliGRAM(s) Oral daily  Nephro-brooke 1 Tablet(s) Oral daily  nicotine - 21 mG/24Hr(s) Patch 1 patch Transdermal daily  omega-3-Acid Ethyl Esters 4 Gram(s) Oral daily  ondansetron   Disintegrating Tablet 4 milliGRAM(s) Oral every 6 hours PRN  pantoprazole    Tablet 40 milliGRAM(s) Oral before breakfast  senna 2 Tablet(s) Oral at bedtime PRN  sertraline 100 milliGRAM(s) Oral daily  sevelamer hydrochloride 800 milliGRAM(s) Oral three times a day with meals  tiotropium 18 MICROgram(s) Capsule 1 Capsule(s) Inhalation daily                          9.7    5.5   )-----------( 157      ( 01 Mar 2018 07:18 )             29.3     01 Mar 2018 07:17    130    |  88     |  54     ----------------------------<  193    5.1     |  21     |  8.59     Ca    9.8        01 Mar 2018 07:17  Phos  5.0       02 Mar 2018 05:52  Mg     1.9       01 Mar 2018 07:18      Assessment and Plan:     Pt is 72M w/hx of NPH, falls, VPS s/p recent fall, found to have R SDH.     ESRD on HD  HD TTS  No Heparin w/HD  TMP 2 kg  Renal diet  Epogen w/HD

## 2018-03-02 NOTE — CONSULT NOTE ADULT - PROBLEM SELECTOR RECOMMENDATION 9
w/ 4 mm MLS on CTH  due to SDH  AED, steroid management per neurosurgery
pt has had multiple follow-ups in past for MIRLANDE nodule with negative PET  bring CT from this admission to outside pulm and decision to be made about new PET as outpt

## 2018-03-03 LAB
GLUCOSE BLDC GLUCOMTR-MCNC: 111 MG/DL — HIGH (ref 70–99)
GLUCOSE BLDC GLUCOMTR-MCNC: 130 MG/DL — HIGH (ref 70–99)
GLUCOSE BLDC GLUCOMTR-MCNC: 133 MG/DL — HIGH (ref 70–99)
GLUCOSE BLDC GLUCOMTR-MCNC: 161 MG/DL — HIGH (ref 70–99)
GLUCOSE BLDC GLUCOMTR-MCNC: 225 MG/DL — HIGH (ref 70–99)
MAGNESIUM SERPL-MCNC: 1.8 MG/DL — SIGNIFICANT CHANGE UP (ref 1.6–2.6)
PHOSPHATE SERPL-MCNC: 5.6 MG/DL — HIGH (ref 2.5–4.5)

## 2018-03-03 PROCEDURE — 99231 SBSQ HOSP IP/OBS SF/LOW 25: CPT

## 2018-03-03 PROCEDURE — 99233 SBSQ HOSP IP/OBS HIGH 50: CPT

## 2018-03-03 RX ORDER — DEXAMETHASONE 0.5 MG/5ML
1 ELIXIR ORAL
Qty: 0 | Refills: 0 | Status: DISCONTINUED | OUTPATIENT
Start: 2018-03-06 | End: 2018-03-05

## 2018-03-03 RX ORDER — DEXAMETHASONE 0.5 MG/5ML
ELIXIR ORAL
Qty: 0 | Refills: 0 | Status: DISCONTINUED | OUTPATIENT
Start: 2018-03-03 | End: 2018-03-05

## 2018-03-03 RX ORDER — DEXAMETHASONE 0.5 MG/5ML
2 ELIXIR ORAL
Qty: 0 | Refills: 0 | Status: DISCONTINUED | OUTPATIENT
Start: 2018-03-03 | End: 2018-03-05

## 2018-03-03 RX ORDER — DEXAMETHASONE 0.5 MG/5ML
1 ELIXIR ORAL
Qty: 0 | Refills: 0 | Status: CANCELLED | OUTPATIENT
Start: 2018-03-09 | End: 2018-03-05

## 2018-03-03 RX ORDER — DEXAMETHASONE 0.5 MG/5ML
0.5 ELIXIR ORAL DAILY
Qty: 0 | Refills: 0 | Status: CANCELLED | OUTPATIENT
Start: 2018-03-15 | End: 2018-03-05

## 2018-03-03 RX ORDER — DEXAMETHASONE 0.5 MG/5ML
0.5 ELIXIR ORAL
Qty: 0 | Refills: 0 | Status: CANCELLED | OUTPATIENT
Start: 2018-03-12 | End: 2018-03-05

## 2018-03-03 RX ADMIN — LEVETIRACETAM 500 MILLIGRAM(S): 250 TABLET, FILM COATED ORAL at 05:46

## 2018-03-03 RX ADMIN — Medication 650 MILLIGRAM(S): at 06:16

## 2018-03-03 RX ADMIN — Medication 1 PATCH: at 12:29

## 2018-03-03 RX ADMIN — Medication 100 MILLIGRAM(S): at 05:46

## 2018-03-03 RX ADMIN — Medication 4 MILLIGRAM(S): at 05:46

## 2018-03-03 RX ADMIN — PANTOPRAZOLE SODIUM 40 MILLIGRAM(S): 20 TABLET, DELAYED RELEASE ORAL at 05:46

## 2018-03-03 RX ADMIN — ERYTHROPOIETIN 10000 UNIT(S): 10000 INJECTION, SOLUTION INTRAVENOUS; SUBCUTANEOUS at 13:56

## 2018-03-03 RX ADMIN — Medication 1 PATCH: at 12:26

## 2018-03-03 RX ADMIN — SERTRALINE 100 MILLIGRAM(S): 25 TABLET, FILM COATED ORAL at 17:56

## 2018-03-03 RX ADMIN — Medication 2 MILLIGRAM(S): at 23:06

## 2018-03-03 RX ADMIN — ATORVASTATIN CALCIUM 40 MILLIGRAM(S): 80 TABLET, FILM COATED ORAL at 21:55

## 2018-03-03 RX ADMIN — Medication 650 MILLIGRAM(S): at 17:58

## 2018-03-03 RX ADMIN — AMLODIPINE BESYLATE 5 MILLIGRAM(S): 2.5 TABLET ORAL at 05:46

## 2018-03-03 RX ADMIN — Medication 50 MILLIGRAM(S): at 05:46

## 2018-03-03 RX ADMIN — Medication 100 MILLIGRAM(S): at 21:55

## 2018-03-03 RX ADMIN — Medication 650 MILLIGRAM(S): at 05:46

## 2018-03-03 RX ADMIN — SEVELAMER CARBONATE 800 MILLIGRAM(S): 2400 POWDER, FOR SUSPENSION ORAL at 17:56

## 2018-03-03 RX ADMIN — Medication 100 MILLIGRAM(S): at 17:56

## 2018-03-03 RX ADMIN — Medication 2: at 21:55

## 2018-03-03 RX ADMIN — GABAPENTIN 100 MILLIGRAM(S): 400 CAPSULE ORAL at 05:46

## 2018-03-03 RX ADMIN — Medication 4 GRAM(S): at 17:55

## 2018-03-03 RX ADMIN — Medication 4 MILLIGRAM(S): at 12:27

## 2018-03-03 RX ADMIN — Medication 650 MILLIGRAM(S): at 18:28

## 2018-03-03 RX ADMIN — SEVELAMER CARBONATE 800 MILLIGRAM(S): 2400 POWDER, FOR SUSPENSION ORAL at 12:27

## 2018-03-03 RX ADMIN — TIOTROPIUM BROMIDE 1 CAPSULE(S): 18 CAPSULE ORAL; RESPIRATORY (INHALATION) at 21:55

## 2018-03-03 RX ADMIN — Medication 2 MILLIGRAM(S): at 17:56

## 2018-03-03 RX ADMIN — LEVETIRACETAM 500 MILLIGRAM(S): 250 TABLET, FILM COATED ORAL at 17:56

## 2018-03-03 RX ADMIN — Medication 1 TABLET(S): at 17:55

## 2018-03-03 RX ADMIN — GABAPENTIN 100 MILLIGRAM(S): 400 CAPSULE ORAL at 12:27

## 2018-03-03 RX ADMIN — GABAPENTIN 100 MILLIGRAM(S): 400 CAPSULE ORAL at 21:55

## 2018-03-03 NOTE — PROGRESS NOTE ADULT - ASSESSMENT
72M s/p fall w/ small subacute SDH in setting of VPS. Patient has remained stable since admission and is pending [facility].    q4 neuro checks  PT/OT  d/c to rehab when accepted

## 2018-03-03 NOTE — PROGRESS NOTE ADULT - ASSESSMENT
72M with VPS placed by Dr. Amaya ([facility]) 2 years ago for NPH, has had multiple falls, 2 weeks ago, and then 2/28 found to have R subacute SDH with~4mm MLS.

## 2018-03-03 NOTE — PROGRESS NOTE ADULT - SUBJECTIVE AND OBJECTIVE BOX
Denies CP, SOB, HA    Vital Signs Last 24 Hrs  T(C): 36.4 (03-03-18 @ 08:51), Max: 37.4 (03-02-18 @ 20:04)  T(F): 97.5 (03-03-18 @ 08:51), Max: 99.4 (03-02-18 @ 20:04)  HR: 54 (03-03-18 @ 08:51) (54 - 66)  BP: 138/63 (03-03-18 @ 08:51) (131/66 - 156/75)  BP(mean): --  RR: 18 (03-03-18 @ 08:51) (18 - 18)  SpO2: 97% (03-03-18 @ 08:51) (94% - 97%)    Gen - no distress  Card - S1S2  Lungs - good air entry b/l  Abd - soft, NT, ND, + BS  Extr - tr edema, AVF + bruit    acetaminophen   Tablet 650 milliGRAM(s) Oral every 6 hours PRN  acetaminophen   Tablet. 650 milliGRAM(s) Oral every 6 hours PRN  ALBUTerol    90 MICROgram(s) HFA Inhaler 2 Puff(s) Inhalation every 4 hours PRN  amLODIPine   Tablet 5 milliGRAM(s) Oral daily  atorvastatin 40 milliGRAM(s) Oral at bedtime  dexamethasone     Tablet   Oral   docusate sodium 100 milliGRAM(s) Oral three times a day  epoetin alex Injectable 05389 Unit(s) IV Push <User Schedule>  gabapentin 100 milliGRAM(s) Oral three times a day  insulin lispro (HumaLOG) corrective regimen sliding scale   SubCutaneous Before meals and at bedtime  levETIRAcetam 500 milliGRAM(s) Oral every 12 hours  metoprolol succinate ER 50 milliGRAM(s) Oral daily  Nephro-brooke 1 Tablet(s) Oral daily  nicotine - 21 mG/24Hr(s) Patch 1 patch Transdermal daily  omega-3-Acid Ethyl Esters 4 Gram(s) Oral daily  ondansetron   Disintegrating Tablet 4 milliGRAM(s) Oral every 6 hours PRN  pantoprazole    Tablet 40 milliGRAM(s) Oral before breakfast  senna 2 Tablet(s) Oral at bedtime PRN  sertraline 100 milliGRAM(s) Oral daily  sevelamer hydrochloride 800 milliGRAM(s) Oral three times a day with meals  tiotropium 18 MICROgram(s) Capsule 1 Capsule(s) Inhalation daily	      Assessment and Plan:     Pt is 72M w/hx of NPH, falls, VPS s/p recent fall, found to have R SDH    ESRD on HD  HD TTS  No Heparin w/HD  TMP 2 kg  Renal diet  Epogen w/HD  D/c planning to [facility]

## 2018-03-03 NOTE — PROGRESS NOTE ADULT - SUBJECTIVE AND OBJECTIVE BOX
Visit Summary: Patient seen and evaluated at bedside.    Overnight Events: no acute events o/n      Exam:  T(C): 36.4 (03-03-18 @ 08:51), Max: 37.4 (03-02-18 @ 20:04)  HR: 54 (03-03-18 @ 08:51) (54 - 66)  BP: 138/63 (03-03-18 @ 08:51) (131/66 - 166/69)  RR: 18 (03-03-18 @ 08:51) (18 - 18)  SpO2: 97% (03-03-18 @ 08:51) (94% - 97%)  Wt(kg): --    AAOx3, EOS, FC  VARGAS 5/5, no drift  SILT throughout      Phos  5.0     03-02

## 2018-03-03 NOTE — PROGRESS NOTE ADULT - PROBLEM SELECTOR PLAN 1
w/ 4 mm MLS due to SDH  AED, steroid management per neurosurgery.  Repeat CTH yesterday with slight increase in size of SDH 1.3cm to 1.5cm. Plan for another repeat CT head on Monday.

## 2018-03-03 NOTE — PROGRESS NOTE ADULT - PROBLEM SELECTOR PLAN 5
Plan for Dialysis today. Normal HD days are Tuesday, Thursday, Saturday.   C/w nephrovite  Started on norvasc for HTN.

## 2018-03-03 NOTE — PROGRESS NOTE ADULT - SUBJECTIVE AND OBJECTIVE BOX
Duran Sifuentes MD  ([facility] Hospitalist)  Pager [phone]      Patient is a 72y old  Male who presents with a chief complaint of Headache (28 Feb 2018 01:27)      SUBJECTIVE / OVERNIGHT EVENTS: No events overnight. No new complaints.     MEDICATIONS  (STANDING):  amLODIPine   Tablet 5 milliGRAM(s) Oral daily  atorvastatin 40 milliGRAM(s) Oral at bedtime  dexamethasone     Tablet 4 milliGRAM(s) Oral every 6 hours  docusate sodium 100 milliGRAM(s) Oral three times a day  epoetin alex Injectable 44644 Unit(s) IV Push <User Schedule>  gabapentin 100 milliGRAM(s) Oral three times a day  insulin lispro (HumaLOG) corrective regimen sliding scale   SubCutaneous Before meals and at bedtime  levETIRAcetam 500 milliGRAM(s) Oral every 12 hours  metoprolol succinate ER 50 milliGRAM(s) Oral daily  Nephro-brooke 1 Tablet(s) Oral daily  nicotine - 21 mG/24Hr(s) Patch 1 patch Transdermal daily  omega-3-Acid Ethyl Esters 4 Gram(s) Oral daily  pantoprazole    Tablet 40 milliGRAM(s) Oral before breakfast  sertraline 100 milliGRAM(s) Oral daily  sevelamer hydrochloride 800 milliGRAM(s) Oral three times a day with meals  tiotropium 18 MICROgram(s) Capsule 1 Capsule(s) Inhalation daily    MEDICATIONS  (PRN):  acetaminophen   Tablet 650 milliGRAM(s) Oral every 6 hours PRN For Temp greater than 38 C (100.4 F)  acetaminophen   Tablet. 650 milliGRAM(s) Oral every 6 hours PRN Mild Pain (1 - 3)  ALBUTerol    90 MICROgram(s) HFA Inhaler 2 Puff(s) Inhalation every 4 hours PRN Bronchospasm  ondansetron   Disintegrating Tablet 4 milliGRAM(s) Oral every 6 hours PRN Nausea  senna 2 Tablet(s) Oral at bedtime PRN Constipation      Vital Signs Last 24 Hrs  T(C): 36.4 (03 Mar 2018 08:51), Max: 37.4 (02 Mar 2018 20:04)  T(F): 97.5 (03 Mar 2018 08:51), Max: 99.4 (02 Mar 2018 20:04)  HR: 54 (03 Mar 2018 08:51) (54 - 66)  BP: 138/63 (03 Mar 2018 08:51) (131/66 - 166/69)  BP(mean): --  RR: 18 (03 Mar 2018 08:51) (18 - 18)  SpO2: 97% (03 Mar 2018 08:51) (94% - 97%)  CAPILLARY BLOOD GLUCOSE      POCT Blood Glucose.: 161 mg/dL (03 Mar 2018 12:23)  POCT Blood Glucose.: 133 mg/dL (03 Mar 2018 08:06)  POCT Blood Glucose.: 158 mg/dL (02 Mar 2018 21:49)  POCT Blood Glucose.: 110 mg/dL (02 Mar 2018 17:45)    I&O's Summary    02 Mar 2018 07:01  -  03 Mar 2018 07:00  --------------------------------------------------------  IN: 360 mL / OUT: 0 mL / NET: 360 mL    03 Mar 2018 07:01  -  03 Mar 2018 12:30  --------------------------------------------------------  IN: 360 mL / OUT: 0 mL / NET: 360 mL        PHYSICAL EXAM:  GENERAL: NAD  HEAD:  Atraumatic, Normocephalic  EYES: conjunctiva and sclera clear  NECK: No JVD  CHEST/LUNG: CTA b/l  HEART: S1 S2 RRR  ABDOMEN: +BS Soft, NT/ND  EXTREMITIES:  2+ DP Pulses, No c/c/e  NEUROLOGY: AAOx3, no focal deficits   SKIN: No rashes or lesions      LABS:    Phos  5.0     03-02      RADIOLOGY & ADDITIONAL TESTS:    Imaging Personally Reviewed:   CT Chest: Left upper lobe mixed solid and groundglass opacity as described above with interval progression since April 27, 2016 worrisome for a primary lung neoplasm. Emphysema. Partially imaged left hemithorax catheter is intact.    Consultant(s) Notes Reviewed: Neurosurgery, Nephrology, Pulmonology     Care Discussed with Consultants/Other Providers: Neurosurgery

## 2018-03-03 NOTE — PROGRESS NOTE ADULT - PROBLEM SELECTOR PLAN 4
Current smoker - counselled on smoking cessation  MIRLANDE opacity concern for malignancy? - no symptoms of PNA  Pulm consult - outpatient pulm Dr. Bain [phone]  c/w nicotine patch  c/w albuterol, spiriva

## 2018-03-04 LAB
GLUCOSE BLDC GLUCOMTR-MCNC: 112 MG/DL — HIGH (ref 70–99)
GLUCOSE BLDC GLUCOMTR-MCNC: 151 MG/DL — HIGH (ref 70–99)
GLUCOSE BLDC GLUCOMTR-MCNC: 171 MG/DL — HIGH (ref 70–99)
GLUCOSE BLDC GLUCOMTR-MCNC: 92 MG/DL — SIGNIFICANT CHANGE UP (ref 70–99)

## 2018-03-04 PROCEDURE — 99232 SBSQ HOSP IP/OBS MODERATE 35: CPT

## 2018-03-04 RX ADMIN — LEVETIRACETAM 500 MILLIGRAM(S): 250 TABLET, FILM COATED ORAL at 18:16

## 2018-03-04 RX ADMIN — LEVETIRACETAM 500 MILLIGRAM(S): 250 TABLET, FILM COATED ORAL at 05:27

## 2018-03-04 RX ADMIN — Medication 1 PATCH: at 13:06

## 2018-03-04 RX ADMIN — Medication 650 MILLIGRAM(S): at 05:28

## 2018-03-04 RX ADMIN — Medication 1 TABLET(S): at 13:06

## 2018-03-04 RX ADMIN — Medication 100 MILLIGRAM(S): at 05:27

## 2018-03-04 RX ADMIN — Medication 2 MILLIGRAM(S): at 13:06

## 2018-03-04 RX ADMIN — AMLODIPINE BESYLATE 5 MILLIGRAM(S): 2.5 TABLET ORAL at 05:27

## 2018-03-04 RX ADMIN — GABAPENTIN 100 MILLIGRAM(S): 400 CAPSULE ORAL at 13:07

## 2018-03-04 RX ADMIN — Medication 2 MILLIGRAM(S): at 05:27

## 2018-03-04 RX ADMIN — GABAPENTIN 100 MILLIGRAM(S): 400 CAPSULE ORAL at 05:27

## 2018-03-04 RX ADMIN — Medication 650 MILLIGRAM(S): at 21:20

## 2018-03-04 RX ADMIN — SERTRALINE 100 MILLIGRAM(S): 25 TABLET, FILM COATED ORAL at 13:06

## 2018-03-04 RX ADMIN — Medication 100 MILLIGRAM(S): at 21:04

## 2018-03-04 RX ADMIN — Medication 1: at 13:07

## 2018-03-04 RX ADMIN — Medication 650 MILLIGRAM(S): at 20:48

## 2018-03-04 RX ADMIN — ATORVASTATIN CALCIUM 40 MILLIGRAM(S): 80 TABLET, FILM COATED ORAL at 21:04

## 2018-03-04 RX ADMIN — Medication 2 MILLIGRAM(S): at 18:17

## 2018-03-04 RX ADMIN — TIOTROPIUM BROMIDE 1 CAPSULE(S): 18 CAPSULE ORAL; RESPIRATORY (INHALATION) at 20:48

## 2018-03-04 RX ADMIN — GABAPENTIN 100 MILLIGRAM(S): 400 CAPSULE ORAL at 21:04

## 2018-03-04 RX ADMIN — PANTOPRAZOLE SODIUM 40 MILLIGRAM(S): 20 TABLET, DELAYED RELEASE ORAL at 05:27

## 2018-03-04 RX ADMIN — Medication 50 MILLIGRAM(S): at 05:27

## 2018-03-04 RX ADMIN — Medication 2 MILLIGRAM(S): at 23:15

## 2018-03-04 RX ADMIN — SEVELAMER CARBONATE 800 MILLIGRAM(S): 2400 POWDER, FOR SUSPENSION ORAL at 18:16

## 2018-03-04 RX ADMIN — Medication 1 PATCH: at 13:34

## 2018-03-04 RX ADMIN — Medication 1: at 21:27

## 2018-03-04 RX ADMIN — SEVELAMER CARBONATE 800 MILLIGRAM(S): 2400 POWDER, FOR SUSPENSION ORAL at 13:06

## 2018-03-04 RX ADMIN — SEVELAMER CARBONATE 800 MILLIGRAM(S): 2400 POWDER, FOR SUSPENSION ORAL at 08:48

## 2018-03-04 RX ADMIN — Medication 650 MILLIGRAM(S): at 05:57

## 2018-03-04 RX ADMIN — Medication 100 MILLIGRAM(S): at 13:06

## 2018-03-04 RX ADMIN — Medication 4 GRAM(S): at 13:06

## 2018-03-04 NOTE — PROGRESS NOTE ADULT - ASSESSMENT
72M with VPS placed by Dr. Amaya ([facility] [[email or web address] 2 years ago for NPH, has had multiple falls, 2 weeks ago, and then today. Since the fall he has reported headache. No weakness/paresthesias/N/V or seizures. CTH performed by PCP for the headache demonstrated a R subacute SDH with~4mm MLS, and he was told to come to the emergency department. He takes baby asa at home, last dose yesterday.       PLAN:  Neuro:   -SDH- repeat CT head tomorrow to monitor SDH  - continue decadron taper  - continue keppra for seizure prophylaxis  - continue gabapentin  - continue zoloft  Respiratory:   - Abnormal CT scan of lung- pt has had multiple follow-ups in past for MIRLANDE nodule with negative PET.  Bring CT from this admission to outside pulm and decision to be made about new PET as outpt.  - COPD -(chronic obstructive pulmonary disease) with emphysema-  cont spiriva, albuterol prn  CV:  -HTN- continue amlodipine, metoprolol  Endocrine:   - continue fingersticks while taking decadron   DVT ppx: venodynes,   PT/OT:  [facility] but pt want s to go home    More than 30 minutes spent on total encounter: more than 50% of the visit was spent on educating the patient and family regarding condition, medications, follow up plans, signs and symptoms to be concerned with, preparing paperwork, and questions answered regarding discharge.      Spectralink # [id number]

## 2018-03-04 NOTE — PROGRESS NOTE ADULT - SUBJECTIVE AND OBJECTIVE BOX
SUBJECTIVE: Pt seen and examined, sitting up in recliner at bedside, states he would like to go home before rehab so he can pay bills, will have PT re-evaluate for possible Home PT instead of rehab    OVERNIGHT EVENTS: none    Vital Signs Last 24 Hrs  T(C): 36.5 (04 Mar 2018 08:08), Max: 37.3 (04 Mar 2018 05:18)  T(F): 97.7 (04 Mar 2018 08:08), Max: 99.1 (04 Mar 2018 05:18)  HR: 52 (04 Mar 2018 08:08) (52 - 62)  BP: 149/66 (04 Mar 2018 08:08) (132/47 - 162/68)  BP(mean): --  RR: 18 (04 Mar 2018 08:08) (17 - 18)  SpO2: 95% (04 Mar 2018 08:08) (94% - 97%)    PHYSICAL EXAM:    General: No Acute Distress     Neurological: Awake, alert oriented to person, place and time, Following Commands, PERRL, EOMI, Face Symmetrical, Speech Fluent, Moving all extremities, Muscle Strength normal in all four extremities, No Drift, Sensation to Light Touch Intact    Pulmonary: Clear to Auscultation, No Rales, No Rhonchi, No Wheezes     Cardiovascular: S1, S2, Regular Rate and Rhythm     Gastrointestinal: Soft, Nontender, Nondistended     Extremities: No calf tenderness     Incision: none    LABS:     Phos  5.6     03-03  Mg     1.8     03-03      Hemoglobin A1C, Whole Blood: 5.3 % (02-13 @ 12:44)      03-03 @ 07:01  -  03-04 @ 07:00  --------------------------------------------------------  IN: 720 mL / OUT: 2000 mL / NET: -1280 mL      DRAINS: none    MEDICATIONS:  Antibiotics:    Neuro:  acetaminophen   Tablet 650 milliGRAM(s) Oral every 6 hours PRN For Temp greater than 38 C (100.4 F)  acetaminophen   Tablet. 650 milliGRAM(s) Oral every 6 hours PRN Mild Pain (1 - 3)  gabapentin 100 milliGRAM(s) Oral three times a day  levETIRAcetam 500 milliGRAM(s) Oral every 12 hours  ondansetron   Disintegrating Tablet 4 milliGRAM(s) Oral every 6 hours PRN Nausea  sertraline 100 milliGRAM(s) Oral daily    Cardiac:  amLODIPine   Tablet 5 milliGRAM(s) Oral daily  metoprolol succinate ER 50 milliGRAM(s) Oral daily    Pulm:  ALBUTerol    90 MICROgram(s) HFA Inhaler 2 Puff(s) Inhalation every 4 hours PRN Bronchospasm  tiotropium 18 MICROgram(s) Capsule 1 Capsule(s) Inhalation daily    GI/:  docusate sodium 100 milliGRAM(s) Oral three times a day  pantoprazole    Tablet 40 milliGRAM(s) Oral before breakfast  senna 2 Tablet(s) Oral at bedtime PRN Constipation    Other:   atorvastatin 40 milliGRAM(s) Oral at bedtime  dexamethasone     Tablet   Oral   dexamethasone     Tablet 2 milliGRAM(s) Oral four times a day  epoetin alex Injectable 57521 Unit(s) IV Push <User Schedule>  insulin lispro (HumaLOG) corrective regimen sliding scale   SubCutaneous Before meals and at bedtime  Nephro-brooke 1 Tablet(s) Oral daily  nicotine - 21 mG/24Hr(s) Patch 1 patch Transdermal daily  omega-3-Acid Ethyl Esters 4 Gram(s) Oral daily  sevelamer hydrochloride 800 milliGRAM(s) Oral three times a day with meals    DIET: [x] Regular [] CCD [] Renal [] Puree [] Dysphagia [] Tube Feeds:     IMAGING:   < from: CT Chest No Cont (03.02.18 @ 10:13) >  Comparison is made with prior studies including the chest CT of December 21, 2016.    There are no pathologically enlarged bilateral axillary, mediastinal or   hilar lymph nodes. There is partially imaged catheter within the left   upper hemithorax subcutaneous tissues likely corresponding to the   patient's given history of shunt coursing through the left subclavian   vein, brachiocephalic vein and terminating within the superior vena   cava/right atrial junction. There is no discontinuity or fractures   involving the catheter. The heart size is enlarged. There is no   pericardial effusion. The main pulmonary arteries enlarged measuring   about 3.5 cm as on the prior study which can be seen in the setting of   pulmonary arterial hypertension. The patient is status post CABG. There   is atherosclerotic disease of the aorta. There are no pleural effusions.    Evaluation of the upper abdominal organs demonstrate nodular thickening   of the left adrenal gland unchanged. The kidneys are shrunken. There are   right renal hypoechoic and hyperdense lesions which are predominantly   unchanged since the prior study. For reference there is a 3 cm hypodense   lesion within the upper pole of the right kidney likely a cyst.    Evaluation of the lungs demonstrate a left upper lobe mixed solid and   groundglass irregular opacity, part of which measures about 3.3 cm   surrounding cluster of cysts or a cyst with multiple internal septation   with overall interval increase in size and progression since the prior   chest CT of April 27, 2016 where it measured about 2.5 cm as measured on   a similar axial slice. There is a solid nodular component along the   inferior aspect of this lesion measuring about 1 cm on image 36 of series   3 with interval increase in size since April 27, 2016 where it measured   about 3 mm. There is emphysema. There are a few scattered bilateral   subcentimeter nodular opacities with the largest within the left lower   lobe measuring up to 6 mm on image 122 of series 3 which are unchanged   since April 27, 2016. There is no pneumonia. There are no central   endobronchial lesions.    Evaluation of the bones demonstrate no significant abnormality.   Sternotomy wires are noted.    IMPRESSION: Left upper lobe mixed solid and groundglass opacity as   described above with interval progression since April 27, 2016 worrisome   for a primary lung neoplasm.    Emphysema.    Partially imaged left hemithorax catheter is intact.    < end of copied text >

## 2018-03-05 ENCOUNTER — TRANSCRIPTION ENCOUNTER (OUTPATIENT)
Age: 73
End: 2018-03-05

## 2018-03-05 VITALS
RESPIRATION RATE: 18 BRPM | OXYGEN SATURATION: 97 % | TEMPERATURE: 98 F | HEART RATE: 56 BPM | SYSTOLIC BLOOD PRESSURE: 151 MMHG | DIASTOLIC BLOOD PRESSURE: 63 MMHG

## 2018-03-05 DIAGNOSIS — I10 ESSENTIAL (PRIMARY) HYPERTENSION: ICD-10-CM

## 2018-03-05 LAB
GLUCOSE BLDC GLUCOMTR-MCNC: 117 MG/DL — HIGH (ref 70–99)
GLUCOSE BLDC GLUCOMTR-MCNC: 146 MG/DL — HIGH (ref 70–99)
GLUCOSE BLDC GLUCOMTR-MCNC: 211 MG/DL — HIGH (ref 70–99)

## 2018-03-05 PROCEDURE — 86900 BLOOD TYPING SEROLOGIC ABO: CPT

## 2018-03-05 PROCEDURE — 85730 THROMBOPLASTIN TIME PARTIAL: CPT

## 2018-03-05 PROCEDURE — 75809 NONVASCULAR SHUNT X-RAY: CPT

## 2018-03-05 PROCEDURE — 80053 COMPREHEN METABOLIC PANEL: CPT

## 2018-03-05 PROCEDURE — 71045 X-RAY EXAM CHEST 1 VIEW: CPT

## 2018-03-05 PROCEDURE — 86850 RBC ANTIBODY SCREEN: CPT

## 2018-03-05 PROCEDURE — 97162 PT EVAL MOD COMPLEX 30 MIN: CPT

## 2018-03-05 PROCEDURE — 82550 ASSAY OF CK (CPK): CPT

## 2018-03-05 PROCEDURE — 82553 CREATINE MB FRACTION: CPT

## 2018-03-05 PROCEDURE — 94640 AIRWAY INHALATION TREATMENT: CPT

## 2018-03-05 PROCEDURE — 84484 ASSAY OF TROPONIN QUANT: CPT

## 2018-03-05 PROCEDURE — 85610 PROTHROMBIN TIME: CPT

## 2018-03-05 PROCEDURE — 80048 BASIC METABOLIC PNL TOTAL CA: CPT

## 2018-03-05 PROCEDURE — 99233 SBSQ HOSP IP/OBS HIGH 50: CPT

## 2018-03-05 PROCEDURE — 82962 GLUCOSE BLOOD TEST: CPT

## 2018-03-05 PROCEDURE — 99261: CPT

## 2018-03-05 PROCEDURE — 97116 GAIT TRAINING THERAPY: CPT

## 2018-03-05 PROCEDURE — 70450 CT HEAD/BRAIN W/O DYE: CPT

## 2018-03-05 PROCEDURE — 99239 HOSP IP/OBS DSCHRG MGMT >30: CPT

## 2018-03-05 PROCEDURE — 99285 EMERGENCY DEPT VISIT HI MDM: CPT | Mod: 25

## 2018-03-05 PROCEDURE — 83735 ASSAY OF MAGNESIUM: CPT

## 2018-03-05 PROCEDURE — 70450 CT HEAD/BRAIN W/O DYE: CPT | Mod: 26

## 2018-03-05 PROCEDURE — 84100 ASSAY OF PHOSPHORUS: CPT

## 2018-03-05 PROCEDURE — 71250 CT THORAX DX C-: CPT

## 2018-03-05 PROCEDURE — 86901 BLOOD TYPING SEROLOGIC RH(D): CPT

## 2018-03-05 PROCEDURE — 85027 COMPLETE CBC AUTOMATED: CPT

## 2018-03-05 RX ORDER — GABAPENTIN 400 MG/1
1 CAPSULE ORAL
Qty: 0 | Refills: 0 | COMMUNITY

## 2018-03-05 RX ORDER — TIOTROPIUM BROMIDE 18 UG/1
1 CAPSULE ORAL; RESPIRATORY (INHALATION)
Qty: 0 | Refills: 0 | COMMUNITY

## 2018-03-05 RX ORDER — SERTRALINE 25 MG/1
1 TABLET, FILM COATED ORAL
Qty: 0 | Refills: 0 | COMMUNITY
Start: 2018-03-05

## 2018-03-05 RX ORDER — NICOTINE POLACRILEX 2 MG
1 GUM BUCCAL
Qty: 0 | Refills: 0 | COMMUNITY

## 2018-03-05 RX ORDER — OMEGA-3 ACID ETHYL ESTERS 1 G
3000 CAPSULE ORAL
Qty: 0 | Refills: 0 | COMMUNITY

## 2018-03-05 RX ORDER — LEVETIRACETAM 250 MG/1
1 TABLET, FILM COATED ORAL
Qty: 28 | Refills: 0 | OUTPATIENT
Start: 2018-03-05 | End: 2018-03-18

## 2018-03-05 RX ORDER — AMLODIPINE BESYLATE 2.5 MG/1
1 TABLET ORAL
Qty: 30 | Refills: 0 | OUTPATIENT
Start: 2018-03-05

## 2018-03-05 RX ORDER — ALBUTEROL 90 UG/1
3 AEROSOL, METERED ORAL
Qty: 0 | Refills: 0 | COMMUNITY

## 2018-03-05 RX ORDER — ACETAMINOPHEN 500 MG
2 TABLET ORAL
Qty: 0 | Refills: 0 | COMMUNITY
Start: 2018-03-05

## 2018-03-05 RX ORDER — ACETAMINOPHEN 500 MG
2 TABLET ORAL
Qty: 0 | Refills: 0 | COMMUNITY

## 2018-03-05 RX ORDER — ALBUTEROL 90 UG/1
2 AEROSOL, METERED ORAL
Qty: 0 | Refills: 0 | COMMUNITY
Start: 2018-03-05

## 2018-03-05 RX ORDER — TIOTROPIUM BROMIDE 18 UG/1
1 CAPSULE ORAL; RESPIRATORY (INHALATION)
Qty: 0 | Refills: 0 | COMMUNITY
Start: 2018-03-05

## 2018-03-05 RX ORDER — ASPIRIN/CALCIUM CARB/MAGNESIUM 324 MG
1 TABLET ORAL
Qty: 0 | Refills: 0 | COMMUNITY

## 2018-03-05 RX ORDER — DEXAMETHASONE 0.5 MG/5ML
2 ELIXIR ORAL
Qty: 30 | Refills: 0 | OUTPATIENT
Start: 2018-03-05 | End: 2018-03-05

## 2018-03-05 RX ORDER — GABAPENTIN 400 MG/1
1 CAPSULE ORAL
Qty: 0 | Refills: 0 | COMMUNITY
Start: 2018-03-05

## 2018-03-05 RX ADMIN — Medication 100 MILLIGRAM(S): at 05:37

## 2018-03-05 RX ADMIN — PANTOPRAZOLE SODIUM 40 MILLIGRAM(S): 20 TABLET, DELAYED RELEASE ORAL at 05:37

## 2018-03-05 RX ADMIN — Medication 2: at 17:49

## 2018-03-05 RX ADMIN — Medication 2 MILLIGRAM(S): at 12:42

## 2018-03-05 RX ADMIN — Medication 1 PATCH: at 12:44

## 2018-03-05 RX ADMIN — Medication 50 MILLIGRAM(S): at 05:37

## 2018-03-05 RX ADMIN — Medication 2 MILLIGRAM(S): at 05:37

## 2018-03-05 RX ADMIN — Medication 100 MILLIGRAM(S): at 14:23

## 2018-03-05 RX ADMIN — Medication 2 MILLIGRAM(S): at 17:49

## 2018-03-05 RX ADMIN — SEVELAMER CARBONATE 800 MILLIGRAM(S): 2400 POWDER, FOR SUSPENSION ORAL at 12:42

## 2018-03-05 RX ADMIN — Medication 1 TABLET(S): at 12:42

## 2018-03-05 RX ADMIN — Medication 650 MILLIGRAM(S): at 05:37

## 2018-03-05 RX ADMIN — GABAPENTIN 100 MILLIGRAM(S): 400 CAPSULE ORAL at 14:23

## 2018-03-05 RX ADMIN — SEVELAMER CARBONATE 800 MILLIGRAM(S): 2400 POWDER, FOR SUSPENSION ORAL at 09:15

## 2018-03-05 RX ADMIN — Medication 1 PATCH: at 12:41

## 2018-03-05 RX ADMIN — LEVETIRACETAM 500 MILLIGRAM(S): 250 TABLET, FILM COATED ORAL at 17:49

## 2018-03-05 RX ADMIN — GABAPENTIN 100 MILLIGRAM(S): 400 CAPSULE ORAL at 05:37

## 2018-03-05 RX ADMIN — LEVETIRACETAM 500 MILLIGRAM(S): 250 TABLET, FILM COATED ORAL at 05:37

## 2018-03-05 RX ADMIN — SERTRALINE 100 MILLIGRAM(S): 25 TABLET, FILM COATED ORAL at 12:42

## 2018-03-05 RX ADMIN — Medication 4 GRAM(S): at 12:42

## 2018-03-05 RX ADMIN — AMLODIPINE BESYLATE 5 MILLIGRAM(S): 2.5 TABLET ORAL at 05:37

## 2018-03-05 RX ADMIN — SEVELAMER CARBONATE 800 MILLIGRAM(S): 2400 POWDER, FOR SUSPENSION ORAL at 17:49

## 2018-03-05 NOTE — PROGRESS NOTE ADULT - PROBLEM SELECTOR PLAN 2
s/p VPS -  shunt was raised from 2 to 2.5  management per Neurosurgery -shunt in atria  xray shuntogram without discontinuity or kinking along its course

## 2018-03-05 NOTE — DISCHARGE NOTE ADULT - ADDITIONAL INSTRUCTIONS
Any sign of fever, chills, lethargy or change in mental status, unsteady gait or difficulty walking contact Dr Renae or go directly to ER.

## 2018-03-05 NOTE — DISCHARGE NOTE ADULT - PLAN OF CARE
VA shunt Strata setting changed to 2.5 Make appointment for folow up with Dr Renae in 1-2 weeks for follow up. Continue decadron taper as directed. Continue keppra as directed. Do not take aspirin until you have seen Dr Renae. stable Resume dialysis tomorrow Make appointment for follow up with your Primary Care Physician for blood pressure and diabetes management. Take disc of CT chest to your Pulmonologist to review and compare left upper lobe lucency.

## 2018-03-05 NOTE — DISCHARGE NOTE ADULT - CARE PLAN
Principal Discharge DX:	Subdural hematoma  Goal:	VA shunt Strata setting changed to 2.5  Secondary Diagnosis:	ESRD on dialysis  Secondary Diagnosis:	Type 2 diabetes mellitus without complication, unspecified long term insulin use status  Secondary Diagnosis:	COPD (chronic obstructive pulmonary disease) with emphysema Principal Discharge DX:	Subdural hematoma  Goal:	VA shunt Strata setting changed to 2.5  Assessment and plan of treatment:	Make appointment for folow up with Dr Renae in 1-2 weeks for follow up. Continue decadron taper as directed. Continue keppra as directed. Do not take aspirin until you have seen Dr Renae.  Secondary Diagnosis:	ESRD on dialysis  Goal:	stable  Assessment and plan of treatment:	Resume dialysis tomorrow  Secondary Diagnosis:	Type 2 diabetes mellitus without complication, unspecified long term insulin use status  Goal:	stable  Assessment and plan of treatment:	Make appointment for follow up with your Primary Care Physician for blood pressure and diabetes management.  Secondary Diagnosis:	COPD (chronic obstructive pulmonary disease) with emphysema  Goal:	stable  Assessment and plan of treatment:	Take disc of CT chest to your Pulmonologist to review and compare left upper lobe lucency.

## 2018-03-05 NOTE — PROGRESS NOTE ADULT - PROBLEM SELECTOR PLAN 1
w/ 4 mm MLS due to SDH - repeat CTH 3/5 with 2.4mm shift and stable bleed  AED, steroid management per neurosurgery.

## 2018-03-05 NOTE — DISCHARGE NOTE ADULT - MEDICATION SUMMARY - MEDICATIONS TO TAKE
I will START or STAY ON the medications listed below when I get home from the hospital:    dexamethasone 1 mg oral tablet  -- 2 tab(s) by mouth 4x a day for today then1 tab 4x day x 3 days then 1 tab 2x day x 3 days then 0.5 tab 2x day x 3 days then 0.5 tab day x 3day  -- It is very important that you take or use this exactly as directed.  Do not skip doses or discontinue unless directed by your doctor.  Obtain medical advice before taking any non-prescription drugs as some may affect the action of this medication.  Take with food or milk.    -- Indication: For Steroid taper for subdural hematoma    acetaminophen 325 mg oral tablet  -- 2 tab(s) by mouth every 6 hours, As needed, Mild Pain (1 - 3)  -- Indication: For mild pain    levETIRAcetam 500 mg oral tablet  -- 1 tab(s) by mouth every 12 hours  -- Indication: For Seizure prevention    gabapentin 100 mg oral capsule  -- 1 cap(s) by mouth 3 times a day  -- Indication: For Neuropathy    sertraline 100 mg oral tablet  -- 1 tab(s) by mouth once a day  -- Indication: For depression    rosuvastatin 10 mg oral tablet  -- 1 tab(s) by mouth once a day (at bedtime)  -- Indication: For Cholesterol    metoprolol tartrate 50 mg oral tablet  -- 1 tab(s) by mouth 2 times a day  -- Indication: For Blood pressure    tiotropium 18 mcg inhalation capsule  -- 1 cap(s) inhaled once a day  -- Indication: For wheezing    albuterol 90 mcg/inh inhalation aerosol  -- 2 puff(s) inhaled every 4 hours, As needed, Bronchospasm  -- Indication: For wheezing    amLODIPine 5 mg oral tablet  -- 1 tab(s) by mouth once a day  -- Indication: For Blood pressure    Colace 100 mg oral capsule  -- 1 cap(s) by mouth 3 times a day  -- Indication: For Stool softener    magnesium oxide 400 mg (241.3 mg elemental magnesium) oral tablet  -- 1 tab(s) by mouth once a day  -- Indication: For Supplement    Fish Oil  -- 3000 milligram(s) by mouth once a day  -- Indication: For Supplement    Auryxia 210 mg oral tablet  -- 1 tab(s) by mouth once a day  -- Indication: For Phosphate binder    omeprazole 20 mg oral delayed release tablet  -- 2 tab(s) by mouth once a day  -- Indication: For reflux    Lindsey-Sarita oral tablet  -- 1 tab(s) by mouth once a day  -- Indication: For Supplement I will START or STAY ON the medications listed below when I get home from the hospital:    dexamethasone 1 mg oral tablet  -- 2 tab(s) by mouth 4x a day for today then1 tab 4x day x 3 days then 1 tab 2x day x 3 days then 0.5 tab 2x day x 3 days then 0.5 tab day x 3day  -- It is very important that you take or use this exactly as directed.  Do not skip doses or discontinue unless directed by your doctor.  Obtain medical advice before taking any non-prescription drugs as some may affect the action of this medication.  Take with food or milk.    -- Indication: For Steroid taper for subdural hematoma    acetaminophen 325 mg oral tablet  -- 2 tab(s) by mouth every 6 hours, As needed, Mild Pain (1 - 3)  -- Indication: For mild pain    levETIRAcetam 500 mg oral tablet  -- 1 tab(s) by mouth every 12 hours  -- Indication: For Seizure prevention    gabapentin 100 mg oral capsule  -- 1 cap(s) by mouth 3 times a day  -- Indication: For Neuropathy    sertraline 100 mg oral tablet  -- 1 tab(s) by mouth once a day  -- Indication: For depression    rosuvastatin 10 mg oral tablet  -- 1 tab(s) by mouth once a day (at bedtime)  -- Indication: For Cholesterol    metoprolol tartrate 50 mg oral tablet  -- 0.5 tab(s) by mouth 2 times a day  -- Indication: For Blood pressure    tiotropium 18 mcg inhalation capsule  -- 1 cap(s) inhaled once a day  -- Indication: For wheezing    albuterol 90 mcg/inh inhalation aerosol  -- 2 puff(s) inhaled every 4 hours, As needed, Bronchospasm  -- Indication: For wheezing    amLODIPine 5 mg oral tablet  -- 1 tab(s) by mouth once a day  -- Indication: For Blood pressure    Colace 100 mg oral capsule  -- 1 cap(s) by mouth 3 times a day  -- Indication: For Stool softener    magnesium oxide 400 mg (241.3 mg elemental magnesium) oral tablet  -- 1 tab(s) by mouth once a day  -- Indication: For Supplement    Auryxia 210 mg oral tablet  -- 1 tab(s) by mouth once a day  -- Indication: For Phosphate binder    omeprazole 20 mg oral delayed release tablet  -- 2 tab(s) by mouth once a day  -- Indication: For reflux    Lindsey-Sarita oral tablet  -- 1 tab(s) by mouth once a day  -- Indication: For Supplement

## 2018-03-05 NOTE — PROGRESS NOTE ADULT - SUBJECTIVE AND OBJECTIVE BOX
Follow-up Pulm Progress Note    No new respiratory events overnight.  Denies SOB/CP. o2 sat 97% on room air, DC planning, pt has NS hosp disc/report to take home and have evaluated by primary out pt pulm    Medications:  MEDICATIONS  (STANDING):  amLODIPine   Tablet 5 milliGRAM(s) Oral daily  atorvastatin 40 milliGRAM(s) Oral at bedtime  dexamethasone     Tablet   Oral   dexamethasone     Tablet 2 milliGRAM(s) Oral four times a day  docusate sodium 100 milliGRAM(s) Oral three times a day  epoetin alex Injectable 41653 Unit(s) IV Push <User Schedule>  gabapentin 100 milliGRAM(s) Oral three times a day  insulin lispro (HumaLOG) corrective regimen sliding scale   SubCutaneous Before meals and at bedtime  levETIRAcetam 500 milliGRAM(s) Oral every 12 hours  metoprolol succinate ER 50 milliGRAM(s) Oral daily  Nephro-brooke 1 Tablet(s) Oral daily  nicotine - 21 mG/24Hr(s) Patch 1 patch Transdermal daily  omega-3-Acid Ethyl Esters 4 Gram(s) Oral daily  pantoprazole    Tablet 40 milliGRAM(s) Oral before breakfast  sertraline 100 milliGRAM(s) Oral daily  sevelamer hydrochloride 800 milliGRAM(s) Oral three times a day with meals  tiotropium 18 MICROgram(s) Capsule 1 Capsule(s) Inhalation daily    MEDICATIONS  (PRN):  acetaminophen   Tablet 650 milliGRAM(s) Oral every 6 hours PRN For Temp greater than 38 C (100.4 F)  acetaminophen   Tablet. 650 milliGRAM(s) Oral every 6 hours PRN Mild Pain (1 - 3)  ALBUTerol    90 MICROgram(s) HFA Inhaler 2 Puff(s) Inhalation every 4 hours PRN Bronchospasm  ondansetron   Disintegrating Tablet 4 milliGRAM(s) Oral every 6 hours PRN Nausea  senna 2 Tablet(s) Oral at bedtime PRN Constipation          Vital Signs Last 24 Hrs  T(C): 36.7 (05 Mar 2018 15:42), Max: 36.7 (04 Mar 2018 16:05)  T(F): 98.1 (05 Mar 2018 15:42), Max: 98.1 (05 Mar 2018 15:42)  HR: 56 (05 Mar 2018 15:42) (51 - 62)  BP: 151/63 (05 Mar 2018 15:42) (134/63 - 167/73)  BP(mean): --  RR: 18 (05 Mar 2018 15:42) (18 - 18)  SpO2: 97% (05 Mar 2018 15:42) (93% - 97%)          03-04 @ 07:01  -  03-05 @ 07:00  --------------------------------------------------------  IN: 1200 mL / OUT: 0 mL / NET: 1200 mL          LABS:                CAPILLARY BLOOD GLUCOSE      POCT Blood Glucose.: 146 mg/dL (05 Mar 2018 12:14)                        CULTURES: (if applicable)          Physical Examination:  PULM: decreased bs bilaterally, no significant sputum production  CVS: S1, S2 heard    RADIOLOGY REVIEWED  CXR:     CT chest:< from: CT Chest No Cont (03.02.18 @ 10:13) >    Axial CT images of the chest are obtained without intravenous   administration of contrast.    Comparison is made with prior studies including the chest CT of December 21, 2016.    There are no pathologically enlarged bilateral axillary, mediastinal or   hilar lymph nodes. There is partially imaged catheter within the left   upper hemithorax subcutaneous tissues likely corresponding to the   patient's given history of shunt coursing through the left subclavian   vein, brachiocephalic vein and terminating within the superior vena   cava/right atrial junction. There is no discontinuity or fractures   involving the catheter. The heart size is enlarged. There is no   pericardial effusion. The main pulmonary arteries enlarged measuring   about 3.5 cm as on the prior study which can be seen in the setting of   pulmonary arterial hypertension. The patient is status post CABG. There   is atherosclerotic disease of the aorta. There are no pleural effusions.    Evaluation of the upper abdominal organs demonstrate nodular thickening   of the left adrenal gland unchanged. The kidneys are shrunken. There are   right renal hypoechoic and hyperdense lesions which are predominantly   unchanged since the prior study. For reference there is a 3 cm hypodense   lesion within the upper pole of the right kidney likely a cyst.    Evaluation of the lungs demonstrate a left upper lobe mixed solid and   groundglass irregular opacity, part of which measures about 3.3 cm   surrounding cluster of cysts or a cyst with multiple internal septation   with overall interval increase in size and progression since the prior   chest CT of April 27, 2016 where it measured about 2.5 cm as measured on   a similar axial slice. There is a solid nodular component along the   inferior aspect of this lesion measuring about 1 cm on image 36 of series   3 with interval increase in size since April 27, 2016 where it measured   about 3 mm. There is emphysema. There are a few scattered bilateral   subcentimeter nodular opacities with the largest within the left lower   lobe measuring up to 6 mm on image 122 of series 3 which are unchanged   since April 27, 2016. There is no pneumonia. There are no central   endobronchial lesions.    Evaluation of the bones demonstrate no significant abnormality.   Sternotomy wires are noted.    IMPRESSION: Left upper lobe mixed solid and groundglass opacity as   described above with interval progression since April 27, 2016 worrisome   for a primary lung neoplasm.    Emphysema.    Partially imaged left hemithorax catheter is intact.    < end of copied text >      TTE:

## 2018-03-05 NOTE — DISCHARGE NOTE ADULT - SECONDARY DIAGNOSIS.
ESRD on dialysis Type 2 diabetes mellitus without complication, unspecified long term insulin use status COPD (chronic obstructive pulmonary disease) with emphysema

## 2018-03-05 NOTE — PROGRESS NOTE ADULT - PROBLEM SELECTOR PLAN 5
Normal HD days are Tuesday, Thursday, Saturday.   C/w nephrovite, renagel  c/w Norvasc - bp improved bp better controlled on norvasc  would c/w norvasc bp better controlled on norvasc  would c/w norvasc  patient advised if unable to fill extended release, he will need to take twice daily metoprolol.

## 2018-03-05 NOTE — DISCHARGE NOTE ADULT - HOME CARE AGENCY
referral forwarded to [facility] [phone] for visiting nurse and home physical therapy. A nurse will call the day after discharge to schedule your appointment.

## 2018-03-05 NOTE — DISCHARGE NOTE ADULT - MEDICATION SUMMARY - MEDICATIONS TO STOP TAKING
I will STOP taking the medications listed below when I get home from the hospital:    Fish Oil  -- 3000 milligram(s) by mouth once a day    Aspirin Enteric Coated 81 mg oral delayed release tablet  -- 1 tab(s) by mouth every other day

## 2018-03-05 NOTE — PROGRESS NOTE ADULT - PROBLEM SELECTOR PLAN 3
s/p PCI then CABG in past   Aspirin held by neurosurgery - restart when cleared by neurosurgery  No exertional chest pain

## 2018-03-05 NOTE — PROGRESS NOTE ADULT - ATTENDING COMMENTS
Agree with above.  Pt with no drift.  Had 4/10 headache this morning helped with tylenol.  Will begin slow dex taper. CT Monday.
Pt is alert today, afebrile, feeling better, headache much less severe.  VARGAS well with trace left drift.  Shunt series suggests Strata II valve at 2.0 though magnet reading on site says 2.5.  The shunt ends in the right atrium of the heart.  Pt states that it was put in the chest.  Pt still on 4 mg q6h dexamethasone.  ASA stopped yesterday.  Spoke to Dr. Amaya regarding pt and I will continue to manage pt for his current condition.  He is for dialysis today.  CT scan head and chest (to verify terminus of shunt) tomorrow.  D/c antiplatelets.  Pt for eventual subacute rehab, pending response to medical therapy.
[facility]  [phone]
[facility]  [phone]

## 2018-03-05 NOTE — PROGRESS NOTE ADULT - SUBJECTIVE AND OBJECTIVE BOX
Denies CP, SOB, HA    Vital Signs Last 24 Hrs  T(C): 36.5 (03-05-18 @ 12:37), Max: 36.7 (03-04-18 @ 16:05)  T(F): 97.7 (03-05-18 @ 12:37), Max: 98 (03-04-18 @ 16:05)  HR: 54 (03-05-18 @ 12:37) (51 - 62)  BP: 156/77 (03-05-18 @ 12:37) (134/63 - 167/73)  BP(mean): --  RR: 18 (03-05-18 @ 12:37) (18 - 18)  SpO2: 96% (03-05-18 @ 12:37) (93% - 96%)    Gen - no distress  Card - S1S2  Lungs - good air entry b/l  Abd - soft, NT, ND, + BS  Extr - no edema, AVF + bruit    acetaminophen   Tablet 650 milliGRAM(s) Oral every 6 hours PRN  acetaminophen   Tablet. 650 milliGRAM(s) Oral every 6 hours PRN  ALBUTerol    90 MICROgram(s) HFA Inhaler 2 Puff(s) Inhalation every 4 hours PRN  amLODIPine   Tablet 5 milliGRAM(s) Oral daily  atorvastatin 40 milliGRAM(s) Oral at bedtime  dexamethasone     Tablet   Oral   dexamethasone     Tablet 2 milliGRAM(s) Oral four times a day  docusate sodium 100 milliGRAM(s) Oral three times a day  epoetin alex Injectable 30767 Unit(s) IV Push <User Schedule>  gabapentin 100 milliGRAM(s) Oral three times a day  insulin lispro (HumaLOG) corrective regimen sliding scale   SubCutaneous Before meals and at bedtime  levETIRAcetam 500 milliGRAM(s) Oral every 12 hours  metoprolol succinate ER 50 milliGRAM(s) Oral daily  Nephro-brooke 1 Tablet(s) Oral daily  nicotine - 21 mG/24Hr(s) Patch 1 patch Transdermal daily  omega-3-Acid Ethyl Esters 4 Gram(s) Oral daily  ondansetron   Disintegrating Tablet 4 milliGRAM(s) Oral every 6 hours PRN  pantoprazole    Tablet 40 milliGRAM(s) Oral before breakfast  senna 2 Tablet(s) Oral at bedtime PRN  sertraline 100 milliGRAM(s) Oral daily  sevelamer hydrochloride 800 milliGRAM(s) Oral three times a day with meals  tiotropium 18 MICROgram(s) Capsule 1 Capsule(s) Inhalation daily      Assessment and Plan:     Pt is 72 M w/hx of NPH, falls, VPS s/p recent fall, R SDH    ESRD on HD TTS  No Heparin w/HD  TMP 2 kg  Renal diet  Epogen w/HD  D/c planning to home w/home PT  Pt will continue op HD TTS as per prior schedule

## 2018-03-05 NOTE — DISCHARGE NOTE ADULT - PROVIDER TOKENS
TOKEN:'[id number]',FREE:[LAST:[Pulmonoligist],PHONE:[(   )    -],FAX:[(   )    -]],FREE:[LAST:[Primary Care Physician],PHONE:[(   )    -],FAX:[(   )    -]]

## 2018-03-05 NOTE — DISCHARGE NOTE ADULT - CARE PROVIDER_API CALL
Eladio Renae), Neurological Surgery  [address] [place] [address]  Phone: [phone]  Fax: [phone]    Pulmonoligist,   Phone: (   )    -  Fax: (   )    -    Primary Care Physician,   Phone: (   )    -  Fax: (   )    -

## 2018-03-05 NOTE — DISCHARGE NOTE ADULT - NS AS ACTIVITY OBS
Do not make important decisions/Showering allowed/Driving allowed/Walking-Indoors allowed/Do not drive or operate machinery/Walking-Outdoors allowed/No Heavy lifting/straining

## 2018-03-05 NOTE — PROGRESS NOTE ADULT - PROBLEM SELECTOR PLAN 6
DVT proph - PAS Normal HD days are Tuesday, Thursday, Saturday.   C/w nephrovite, renagel Normal HD days are Tuesday, Thursday, Saturday.   C/w nephrovite, renagel  magnesium level normal - now off mag oxide - patient advised to repeat blood work as outpatient.

## 2018-03-05 NOTE — PROGRESS NOTE ADULT - SUBJECTIVE AND OBJECTIVE BOX
Patient is a 72y old  Male who presents with a chief complaint of Headache (28 Feb 2018 01:27)        SUBJECTIVE / OVERNIGHT EVENTS:      MEDICATIONS  (STANDING):  amLODIPine   Tablet 5 milliGRAM(s) Oral daily  atorvastatin 40 milliGRAM(s) Oral at bedtime  dexamethasone     Tablet   Oral   dexamethasone     Tablet 2 milliGRAM(s) Oral four times a day  docusate sodium 100 milliGRAM(s) Oral three times a day  epoetin alex Injectable 14666 Unit(s) IV Push <User Schedule>  gabapentin 100 milliGRAM(s) Oral three times a day  insulin lispro (HumaLOG) corrective regimen sliding scale   SubCutaneous Before meals and at bedtime  levETIRAcetam 500 milliGRAM(s) Oral every 12 hours  metoprolol succinate ER 50 milliGRAM(s) Oral daily  Nephro-brooke 1 Tablet(s) Oral daily  nicotine - 21 mG/24Hr(s) Patch 1 patch Transdermal daily  omega-3-Acid Ethyl Esters 4 Gram(s) Oral daily  pantoprazole    Tablet 40 milliGRAM(s) Oral before breakfast  sertraline 100 milliGRAM(s) Oral daily  sevelamer hydrochloride 800 milliGRAM(s) Oral three times a day with meals  tiotropium 18 MICROgram(s) Capsule 1 Capsule(s) Inhalation daily    MEDICATIONS  (PRN):  acetaminophen   Tablet 650 milliGRAM(s) Oral every 6 hours PRN For Temp greater than 38 C (100.4 F)  acetaminophen   Tablet. 650 milliGRAM(s) Oral every 6 hours PRN Mild Pain (1 - 3)  ALBUTerol    90 MICROgram(s) HFA Inhaler 2 Puff(s) Inhalation every 4 hours PRN Bronchospasm  ondansetron   Disintegrating Tablet 4 milliGRAM(s) Oral every 6 hours PRN Nausea  senna 2 Tablet(s) Oral at bedtime PRN Constipation      Vital Signs Last 24 Hrs  T(C): 36.6 (05 Mar 2018 08:14), Max: 36.9 (04 Mar 2018 12:30)  T(F): 97.9 (05 Mar 2018 08:14), Max: 98.4 (04 Mar 2018 12:30)  HR: 51 (05 Mar 2018 08:14) (51 - 62)  BP: 137/73 (05 Mar 2018 08:14) (134/63 - 167/73)  BP(mean): --  RR: 18 (05 Mar 2018 08:14) (18 - 18)  SpO2: 96% (05 Mar 2018 08:14) (93% - 96%)  CAPILLARY BLOOD GLUCOSE      POCT Blood Glucose.: 117 mg/dL (05 Mar 2018 08:12)  POCT Blood Glucose.: 151 mg/dL (04 Mar 2018 21:21)  POCT Blood Glucose.: 92 mg/dL (04 Mar 2018 17:18)  POCT Blood Glucose.: 171 mg/dL (04 Mar 2018 12:33)    I&O's Summary    04 Mar 2018 07:01  -  05 Mar 2018 07:00  --------------------------------------------------------  IN: 1200 mL / OUT: 0 mL / NET: 1200 mL        PHYSICAL EXAM:  GENERAL: NAD  HEAD:  Atraumatic, Normocephalic  EYES: conjunctiva and sclera clear  NECK: No JVD  CHEST/LUNG: CTA b/l  HEART: S1 S2 RRR  ABDOMEN: +BS Soft, NT/ND  EXTREMITIES:  2+ DP Pulses, No c/c/e  NEUROLOGY: AAOx3, no focal deficits   SKIN: No rashes or lesions    LABS:      Phos  5.6     03-03  Mg     1.8     03-03                RADIOLOGY & ADDITIONAL TESTS:    Imaging Personally Reviewed: CT head reviewed - Mixed attenuated subdural hematoma involving the right temporal frontal   parietal region is again seen with both acute and chronic components.   This subdural hematoma measures approximately 1.5 cm and widest diameter   which is unchanged in size when compared with the prior study. Localized   mass effect consisting of sulcal effacement is again seen. Mass effect on   the right lateral ventricle is again seen. Right to left shift (2.4 mm)     Consultant(s) Notes Reviewed: Renal   Care Discussed with Consultants/Other Providers: Patient is a 72y old  Male who presents with a chief complaint of Headache (28 Feb 2018 01:27)        SUBJECTIVE / OVERNIGHT EVENTS: no acute complaints      MEDICATIONS  (STANDING):  amLODIPine   Tablet 5 milliGRAM(s) Oral daily  atorvastatin 40 milliGRAM(s) Oral at bedtime  dexamethasone     Tablet   Oral   dexamethasone     Tablet 2 milliGRAM(s) Oral four times a day  docusate sodium 100 milliGRAM(s) Oral three times a day  epoetin alex Injectable 21604 Unit(s) IV Push <User Schedule>  gabapentin 100 milliGRAM(s) Oral three times a day  insulin lispro (HumaLOG) corrective regimen sliding scale   SubCutaneous Before meals and at bedtime  levETIRAcetam 500 milliGRAM(s) Oral every 12 hours  metoprolol succinate ER 50 milliGRAM(s) Oral daily  Nephro-brooke 1 Tablet(s) Oral daily  nicotine - 21 mG/24Hr(s) Patch 1 patch Transdermal daily  omega-3-Acid Ethyl Esters 4 Gram(s) Oral daily  pantoprazole    Tablet 40 milliGRAM(s) Oral before breakfast  sertraline 100 milliGRAM(s) Oral daily  sevelamer hydrochloride 800 milliGRAM(s) Oral three times a day with meals  tiotropium 18 MICROgram(s) Capsule 1 Capsule(s) Inhalation daily    MEDICATIONS  (PRN):  acetaminophen   Tablet 650 milliGRAM(s) Oral every 6 hours PRN For Temp greater than 38 C (100.4 F)  acetaminophen   Tablet. 650 milliGRAM(s) Oral every 6 hours PRN Mild Pain (1 - 3)  ALBUTerol    90 MICROgram(s) HFA Inhaler 2 Puff(s) Inhalation every 4 hours PRN Bronchospasm  ondansetron   Disintegrating Tablet 4 milliGRAM(s) Oral every 6 hours PRN Nausea  senna 2 Tablet(s) Oral at bedtime PRN Constipation      Vital Signs Last 24 Hrs  T(C): 36.6 (05 Mar 2018 08:14), Max: 36.9 (04 Mar 2018 12:30)  T(F): 97.9 (05 Mar 2018 08:14), Max: 98.4 (04 Mar 2018 12:30)  HR: 51 (05 Mar 2018 08:14) (51 - 62)  BP: 137/73 (05 Mar 2018 08:14) (134/63 - 167/73)  BP(mean): --  RR: 18 (05 Mar 2018 08:14) (18 - 18)  SpO2: 96% (05 Mar 2018 08:14) (93% - 96%)  CAPILLARY BLOOD GLUCOSE      POCT Blood Glucose.: 117 mg/dL (05 Mar 2018 08:12)  POCT Blood Glucose.: 151 mg/dL (04 Mar 2018 21:21)  POCT Blood Glucose.: 92 mg/dL (04 Mar 2018 17:18)  POCT Blood Glucose.: 171 mg/dL (04 Mar 2018 12:33)    I&O's Summary    04 Mar 2018 07:01  -  05 Mar 2018 07:00  --------------------------------------------------------  IN: 1200 mL / OUT: 0 mL / NET: 1200 mL        PHYSICAL EXAM:  GENERAL: NAD  HEAD:  Atraumatic, Normocephalic  EYES: conjunctiva and sclera clear  NECK: No JVD  CHEST/LUNG: slight left basilar crackles  HEART: S1 S2 RRR  ABDOMEN: +BS Soft, NT/ND  EXTREMITIES:  2+ DP Pulses, No c/c/e  NEUROLOGY: AAOx3, no focal deficits   SKIN: No rashes or lesions    LABS:      Phos  5.6     03-03  Mg     1.8     03-03                RADIOLOGY & ADDITIONAL TESTS:    Imaging Personally Reviewed: CT head reviewed - Mixed attenuated subdural hematoma involving the right temporal frontal   parietal region is again seen with both acute and chronic components.   This subdural hematoma measures approximately 1.5 cm and widest diameter   which is unchanged in size when compared with the prior study. Localized   mass effect consisting of sulcal effacement is again seen. Mass effect on   the right lateral ventricle is again seen. Right to left shift (2.4 mm)     Consultant(s) Notes Reviewed: Renal   Care Discussed with Consultants/Other Providers:

## 2018-03-05 NOTE — PROGRESS NOTE ADULT - PROBLEM SELECTOR PLAN 1
Problem: Abnormal CT scan of lung. Recommendation: pt has had multiple follow-ups in past for MIRLANDE nodule with negative PET  bring CT from this admission (pt has at bedside) to outside pulm and decision to be made about new PET as outpt

## 2018-03-05 NOTE — DISCHARGE NOTE ADULT - PATIENT PORTAL LINK FT
You can access the [facility] Patient Portal, offered by [facility], by registering with the following website: [email or web address]

## 2018-03-05 NOTE — PROGRESS NOTE ADULT - PROVIDER SPECIALTY LIST ADULT
Hospitalist
Nephrology
Neurosurgery
Pulmonology
Neurosurgery
Neurosurgery

## 2018-03-05 NOTE — PROGRESS NOTE ADULT - ASSESSMENT
73 y/o M with PMH of NPH s/p VA Shunt, ESRD-HD, COPD-emphysema, current smoker, CAD s/p CABG x3/PCI, DMT2, multiple falls, presents from PCP office 2/28 with headache. Outpt CT head demonstrated subacute R SDH with midline shift. No acute neurosurgical intervention. Incidentally noted MIRLANDE mass, progressed from CT 12/2016 concerning for malignancy.

## 2018-03-05 NOTE — PROGRESS NOTE ADULT - PROBLEM SELECTOR PLAN 4
Current smoker - counselled on smoking cessation  MIRLANDE opacity concern for malignancy? - no symptoms of PNA  Pulm consult - outpatient pulm Dr. Bain [phone]  c/w nicotine patch  c/w albuterol, spiriva

## 2018-03-05 NOTE — DISCHARGE NOTE ADULT - HOSPITAL COURSE
72M with [facility] placed by Dr. Amaya ([facility]) 2 years ago for NPH, has had multiple falls, 2 weeks ago, and then today. Since the fall he has reported headache. No weakness/paresthesias/N/V or seizures. CTH performed by PCP for the headache demonstrated a R subacute SDH with~4mm MLS, and he was told to come to the emergency department. He takes baby asa at home, last dose yesterday.   Follow up CT head is stable. Pt placed on decadron taper for residual blood. CT chest revealed MIRLANDE ground glass lucency. Pt was evaluated by Pulmonology.  Pt given CT chest on disc to follow up with his pulmonologist to compare to previous chest CT. Pt is to resume dialysis tomorrow. Pt was evaluated byPhysical Therapy and was originally recommended for Subacute rehab but recommendation was changed to Home PT. Pt is medically and neurologically stable for discharge to home.

## 2018-03-05 NOTE — PROGRESS NOTE ADULT - ASSESSMENT
72M with VPS placed by Dr. Amaya ([facility]) 2 years ago for NPH, has had multiple falls, 2 weeks ago, and then today. Since the fall he has reported headache. No weakness/paresthesias/N/V or seizures. CTH performed by PCP for the headache demonstrated a R subacute SDH with~4mm MLS, and he was told to come to the emergency department. He takes baby asa at home, last dose yesterday.       PLAN:  Neuro:   -SDH- repeat CT head- stable SDH  - continue decadron taper  - continue keppra for seizure prophylaxis  - continue gabapentin  - continue zoloft  Respiratory:   - Abnormal CT scan of lung- pt has had multiple follow-ups in past for MIRLANDE nodule with negative PET.  Bring CT from this admission to outside pulm and decision to be made about new PET as outpt.  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      Phonologics # [id number]

## 2018-03-05 NOTE — PROGRESS NOTE ADULT - SUBJECTIVE AND OBJECTIVE BOX
SUBJECTIVE: Pt seen and examined, doing well, would like to go home    OVERNIGHT EVENTS: none    Vital Signs Last 24 Hrs  T(C): 36.7 (05 Mar 2018 15:42), Max: 36.7 (04 Mar 2018 16:05)  T(F): 98.1 (05 Mar 2018 15:42), Max: 98.1 (05 Mar 2018 15:42)  HR: 56 (05 Mar 2018 15:42) (51 - 62)  BP: 151/63 (05 Mar 2018 15:42) (134/63 - 167/73)  BP(mean): --  RR: 18 (05 Mar 2018 15:42) (18 - 18)  SpO2: 97% (05 Mar 2018 15:42) (93% - 97%)    PHYSICAL EXAM:    General: No Acute Distress     Neurological: Awake, alert oriented to person, place and time, Following Commands, PERRL, EOMI, Face Symmetrical, Speech Fluent, Moving all extremities, Muscle Strength normal in all four extremities, No Drift, Sensation to Light Touch Intact    Pulmonary: Clear to Auscultation, No Rales, No Rhonchi, No Wheezes     Cardiovascular: S1, S2, Regular Rate and Rhythm     Gastrointestinal: Soft, Nontender, Nondistended     Extremities: No calf tenderness     Incision: none    LABS:         Hemoglobin A1C, Whole Blood: 5.3 % (02-13 @ 12:44)      03-04 @ 07:01  -  03-05 @ 07:00  --------------------------------------------------------  IN: 1200 mL / OUT: 0 mL / NET: 1200 mL    03-05 @ 07:01  -  03-05 @ 15:53  --------------------------------------------------------  IN: 480 mL / OUT: 0 mL / NET: 480 mL      DRAINS: none    MEDICATIONS:  Antibiotics:    Neuro:  acetaminophen   Tablet 650 milliGRAM(s) Oral every 6 hours PRN For Temp greater than 38 C (100.4 F)  acetaminophen   Tablet. 650 milliGRAM(s) Oral every 6 hours PRN Mild Pain (1 - 3)  gabapentin 100 milliGRAM(s) Oral three times a day  levETIRAcetam 500 milliGRAM(s) Oral every 12 hours  ondansetron   Disintegrating Tablet 4 milliGRAM(s) Oral every 6 hours PRN Nausea  sertraline 100 milliGRAM(s) Oral daily    Cardiac:  amLODIPine   Tablet 5 milliGRAM(s) Oral daily  metoprolol succinate ER 50 milliGRAM(s) Oral daily    Pulm:  ALBUTerol    90 MICROgram(s) HFA Inhaler 2 Puff(s) Inhalation every 4 hours PRN Bronchospasm  tiotropium 18 MICROgram(s) Capsule 1 Capsule(s) Inhalation daily    GI/:  docusate sodium 100 milliGRAM(s) Oral three times a day  pantoprazole    Tablet 40 milliGRAM(s) Oral before breakfast  senna 2 Tablet(s) Oral at bedtime PRN Constipation    Other:   atorvastatin 40 milliGRAM(s) Oral at bedtime  dexamethasone     Tablet   Oral   dexamethasone     Tablet 2 milliGRAM(s) Oral four times a day  epoetin alex Injectable 80446 Unit(s) IV Push <User Schedule>  insulin lispro (HumaLOG) corrective regimen sliding scale   SubCutaneous Before meals and at bedtime  Nephro-brooke 1 Tablet(s) Oral daily  nicotine - 21 mG/24Hr(s) Patch 1 patch Transdermal daily  omega-3-Acid Ethyl Esters 4 Gram(s) Oral daily  sevelamer hydrochloride 800 milliGRAM(s) Oral three times a day with meals    DIET: [] Regular [x] CCD [] Renal [] Puree [] Dysphagia [] Tube Feeds:     IMAGING:   < from: CT Head No Cont (03.05.18 @ 08:35) >  Multiple axial sections were performed base of skull to vertex without   contrast enhancement. Coronal and sagittal reconstructions were performed   as well.    Mixed attenuated subdural hematoma involving the right temporal frontal   parietal region is again seen with both acute and chronic components.   This subdural hematoma measures approximately 1.5 cmand widest diameter   which is unchanged in size when compared with the prior study. Localized   mass effect consisting of sulcal effacement is again seen. Mass effect on   the right lateral ventricle is again seen. Right to left shift (2.4 mm)   is identified.    The size and configuration of the ventricles appear unchanged.    Left frontal shunt catheter is again seen and unchanged in position.    Evaluation of the osseous structures with the appropriate window aside   from postop changes appear unremarkable    Impression: Mixed attenuated subdural hematoma again seen as described   above.     < end of copied text >  < from: CT Chest No Cont (03.02.18 @ 10:13) >  IMPRESSION: Left upper lobe mixed solid and groundglass opacity as   described above with interval progression since April 27, 2016 worrisome   for a primary lung neoplasm.    Emphysema.    Partially imaged left hemithorax catheter is intact.    < end of copied text >

## 2018-03-19 ENCOUNTER — APPOINTMENT (OUTPATIENT)
Dept: NEUROSURGERY | Facility: CLINIC | Age: 73
End: 2018-03-19
Payer: MEDICARE

## 2018-03-19 ENCOUNTER — OUTPATIENT (OUTPATIENT)
Dept: OUTPATIENT SERVICES | Facility: HOSPITAL | Age: 73
LOS: 1 days | End: 2018-03-19
Payer: MEDICARE

## 2018-03-19 VITALS
HEIGHT: 68 IN | DIASTOLIC BLOOD PRESSURE: 68 MMHG | HEART RATE: 59 BPM | BODY MASS INDEX: 26.52 KG/M2 | OXYGEN SATURATION: 96 % | TEMPERATURE: 97.6 F | WEIGHT: 175 LBS | SYSTOLIC BLOOD PRESSURE: 165 MMHG

## 2018-03-19 DIAGNOSIS — G91.9 HYDROCEPHALUS, UNSPECIFIED: ICD-10-CM

## 2018-03-19 DIAGNOSIS — I62.03 NONTRAUMATIC CHRONIC SUBDURAL HEMORRHAGE: ICD-10-CM

## 2018-03-19 DIAGNOSIS — Z98.2 PRESENCE OF CEREBROSPINAL FLUID DRAINAGE DEVICE: Chronic | ICD-10-CM

## 2018-03-19 PROCEDURE — 99214 OFFICE O/P EST MOD 30 MIN: CPT

## 2018-03-19 PROCEDURE — 70450 CT HEAD/BRAIN W/O DYE: CPT | Mod: 26

## 2018-03-19 PROCEDURE — 70450 CT HEAD/BRAIN W/O DYE: CPT

## 2018-03-19 RX ORDER — PARICALCITOL 2 UG/1
2 CAPSULE, LIQUID FILLED ORAL
Refills: 0 | Status: ACTIVE | COMMUNITY

## 2018-03-19 RX ORDER — ALBUTEROL SULFATE 2.5 MG/3ML
(2.5 MG/3ML) SOLUTION RESPIRATORY (INHALATION)
Refills: 0 | Status: ACTIVE | COMMUNITY

## 2018-03-19 RX ORDER — ALBUTEROL SULFATE 90 UG/1
108 (90 BASE) AEROSOL, METERED RESPIRATORY (INHALATION)
Refills: 0 | Status: ACTIVE | COMMUNITY

## 2018-03-19 RX ORDER — ROSUVASTATIN CALCIUM 20 MG/1
20 TABLET, FILM COATED ORAL DAILY
Refills: 0 | Status: ACTIVE | COMMUNITY

## 2018-03-19 RX ORDER — AMLODIPINE BESYLATE 5 MG/1
5 TABLET ORAL DAILY
Refills: 0 | Status: ACTIVE | COMMUNITY

## 2018-03-19 RX ORDER — DARBEPOETIN ALFA IN ALBUMN SOL 40 MCG/ML
40 VIAL (ML) INJECTION
Refills: 0 | Status: ACTIVE | COMMUNITY

## 2018-03-19 RX ORDER — DEXAMETHASONE 1 MG/1
1 TABLET ORAL DAILY
Refills: 0 | Status: ACTIVE | COMMUNITY

## 2018-03-19 RX ORDER — NICOTINE 21 MG/24HR
21 PATCH, TRANSDERMAL 24 HOURS TRANSDERMAL
Refills: 0 | Status: ACTIVE | COMMUNITY

## 2018-03-19 RX ORDER — LEVETIRACETAM 500 MG/1
500 TABLET, FILM COATED ORAL DAILY
Refills: 0 | Status: ACTIVE | COMMUNITY

## 2018-03-19 RX ORDER — TIOTROPIUM BROMIDE 18 UG/1
18 CAPSULE ORAL; RESPIRATORY (INHALATION)
Refills: 0 | Status: ACTIVE | COMMUNITY

## 2018-04-16 ENCOUNTER — APPOINTMENT (OUTPATIENT)
Dept: NEUROSURGERY | Facility: CLINIC | Age: 73
End: 2018-04-16

## 2018-07-21 NOTE — ED PROVIDER NOTE - CARDIAC, MLM
negative Alert & oriented; no sensory, motor or coordination deficits, normal reflexes Normal rate, regular rhythm.  Heart sounds S1, S2.  No murmurs, rubs or gallops.

## 2019-01-14 NOTE — ED ADULT NURSE NOTE - NEURO WDL
Adequate: hears normal conversation without difficulty
Alert and oriented to person, place and time, memory intact, behavior appropriate to situation, PERRL.

## 2020-02-14 NOTE — PROGRESS NOTE ADULT - PROBLEM SELECTOR PROBLEM 3
Coronary artery disease involving native coronary artery of native heart without angina pectoris Home

## 2020-09-09 NOTE — PROGRESS NOTE ADULT - PROBLEM SELECTOR PROBLEM 3
Vital signs noted, see flowsheet.  General: Well nourished/developed. In no acute distress, well appearing and non-toxic.  HEENT: Normocephalic/atraumatic.  Moist mucous membranes. No nasal discharge, throat clear, no tonsilar/uvula swelling. No drooling.  Conjunctiva and sclera clear b/l.  PERRL.  Dental: Right lower dental cavity, no palpable abscess to gums, no swelling or bleeding.   Neck: Soft and supple  Cardiac: Regular rate and rhythm. +S1/S2. Peripheral pulses 2+ and symmetric b/l.   Respiratory: Speaking in full sentences. Lungs clear to ascultation b/l, no wheezes/rhonchi/rales/stridor.   Abdomen: Soft, NTND. No guarding.  RLE: Swelling to RLE, multiple healing abrasions to right calf, erythema to RLE. Cap refill less than 2 seconds   Neuro: Awake, alert and oriented to person/place/time/situation. Moves all extremities spontaneously and symmetrically.  No focal deficits. Coronary artery disease involving native coronary artery of native heart without angina pectoris

## 2021-02-09 NOTE — ED ADULT NURSE NOTE - EENT WDL
0
Eyes with no visual disturbances.  Ears clean and dry and no hearing difficulties. Nose with pink mucosa and no drainage.  Mouth mucous membranes moist and pink.  No tenderness or swelling to throat or neck.

## 2022-08-23 NOTE — ED PROVIDER NOTE - SEVERITY
Meryl LUCASNP reviewed download from 07/20/22 to 08/18/22 . Per Meryl the patient is averaging 7 hr 32 min of daily use.  The patient had 100% of days with use >4 hours.  Patient's AHI is normal at 0.8.  Recommendations from Meryl \"Data looks very good, No changes needed\".     Patient was called and message was left to please return our call.   
The patient returned call and was notified of results and recommendations per Meryl Mcarthur NP.  Patient verbalized understanding and appreciation.  She is doing well on treatment.  
mild
upright (90 degrees)

## 2023-09-14 NOTE — DISCHARGE NOTE ADULT - CONDITION (STATED IN TERMS THAT PERMIT A SPECIFIC MEASURABLE COMPARISON WITH CONDITION ON ADMISSION):
In an effort to ensure that our patients [employer], a Team Member has reviewed your chart and identified an opportunity to provide the best care possible. An attempt was made to discuss or schedule overdue Preventive or Disease Management screening.     The Outcome was Contact was not made, left messageIf you have any questions or need help with scheduling, contact your primary care provider.. Care Gaps include Immunizations and Wellness Visits.     stable

## 2024-02-09 NOTE — ED PROVIDER NOTE - CROS ED CARDIOVAS ALL NEG
Form received at [facility] on 2/9/2024.     Please note that it takes 7-10 business days for completion.    Signed authorization received with form.    negative...

## 2024-02-23 NOTE — H&P ADULT - NEUROLOGICAL
Patient has hypokalemia which is Acute . Most recent potassium levels reviewed-   Lab Results   Component Value Date    K 3.8 02/19/2024   . Will continue potassium replacement per protocol and recheck repeat levels after replacement completed.    detailed exam

## 2024-03-08 NOTE — CONSULT NOTE ADULT - ASSESSMENT
Detail Level: Detailed 71 y/o M with PMH of NPH s/p [facility] Shunt, ESRD-HD, COPD-emphysema, current smoker, CAD s/p CABG x3/PCI, DMT2, multiple falls, presents from PCP office 2/28 with headache. Outpt CT head demonstrated subacute R SDH with midline shift, hydrocephalus. No acute neurosurgical intervention. Shunt reprogrammed for hydrocephalus. Incidentally noted MIRLANDE mass, progressed from CT 12/2016 concerning for malignancy. Patient Specific Counseling (Will Not Stick From Patient To Patient): X\\nPT ALSO HAS A DIAGNOSIS OF ATOPIC DERMATITIS AND PRURIGO NODULARIS. PT HAS DONE UVB PHOTOTHERAPIES AND MULTIPLE TOPICAL STEROIDS, INCLUDING TRIAMCINOLONE, CLOBETASOL, FLUOCINONIDE, DESONIDE, AND NON STEROIDALS, SUCH AS TACROLIMUS AND PIMECROLIMUS. \\n\\nDK would like to begin pt on a biological medication, Dupixent, as the topical treatments and antihistamines have not resolved rash or pruritus. Pt asks if this rash could be scabies and DK reassures pt this could not be scabies based on his evaluation as the rash does not present in areas typical to scabies. DK counsels pt the scratches and excoriations in areas pt can scratch, typical to prurigo nodularis and not to scabies. DK counsels pt the rashes do not have other indications of scabies such as burrowing. \\n\\nPt has failed phototherapy and would not like to continue UVB tx. DK counsels pt he should not have too long or hot showers and to always moisturize when his skin is wet, using fragrance free and gentle moisturizers and cleansers. 73 y/o M with PMH of NPH s/p [facility] Shunt, ESRD-HD, COPD-emphysema, current smoker, CAD s/p CABG x3/PCI, DMT2, multiple falls, presents from PCP office 2/28 with headache. Outpt CT head demonstrated subacute R SDH with midline shift. No acute neurosurgical intervention. Incidentally noted MIRLANDE mass, progressed from CT 12/2016 concerning for malignancy. Patient Specific Counseling (Will Not Stick From Patient To Patient): ==\\nDK counsels pt he be,wives Dupixent would be the best next option for tx of pts prurigo nodularis. DK examines pt today and notes he has more than 20 lesion and this rash has been progressively worsening for the past 3 years.

## 2025-05-01 NOTE — PATIENT PROFILE ADULT. - AS SC BRADEN NUTRITION
Quan Ramírez  70 y.o. male  1954  MRN:[id number]  [facility]  Progress Note     Encounter Date: 5/1/2025    Assessment and Plan:       ICD-10-CM    1. Medicare annual wellness visit, subsequent  Z00.00       2. TIA (transient ischemic attack)  G45.9       3. Mixed hyperlipidemia  E78.2       4. Elevated blood-pressure reading, without diagnosis of hypertension  R03.0       5. Chronic right shoulder pain  M25.511     G89.29         1. Medicare annual wellness visit, subsequent  Updated  See OPHTH and DERM  2. TIA (transient ischemic attack) 2016 full hemiparesis for 45 minutes.  Continue aspirin and statin.  3. Mixed hyperlipidemia  Lipids look good on this dose of atorvastatin.  Healthy oils discussed.  4. Elevated blood-pressure reading, without diagnosis of hypertension  BP elevated here but consistently normal at home with good cuff.  I think most of this is white coat syndrome.  5. Shoulder pain- much better with use of tumeric.       I have discussed the diagnosis with the patient and the intended plan as seen in the above orders.  he has expressed understanding.  The patient has received an after-visit summary and questions were answered concerning future plans.  I have discussed medication side effects and warnings with the patient as well.    Electronically Signed: Jean Mendenhall MD    Current Medications after this visit     Current Outpatient Medications   Medication Sig    atorvastatin (LIPITOR) 10 MG tablet TAKE 1 TABLET BY MOUTH DAILY    aspirin 81 MG EC tablet Take 1 tablet by mouth daily     No current facility-administered medications for this visit.     There are no discontinued medications.  ~~~~~~~~~~~~~~~~~~~~~~~~~~~~~~~~~~~~~~~~~~~~~~~~~~~~~~~~~~~    Chief Complaint   Patient presents with    Medicare AWV    Cholesterol Problem    Medication Refill       History provided by patient  History of Present Illness   Quan Ramírez is a 70 y.o. male who  (3) adequate